# Patient Record
Sex: FEMALE | Race: BLACK OR AFRICAN AMERICAN | NOT HISPANIC OR LATINO | Employment: FULL TIME | URBAN - METROPOLITAN AREA
[De-identification: names, ages, dates, MRNs, and addresses within clinical notes are randomized per-mention and may not be internally consistent; named-entity substitution may affect disease eponyms.]

---

## 2017-03-28 ENCOUNTER — OFFICE VISIT (OUTPATIENT)
Dept: INTERNAL MEDICINE | Facility: CLINIC | Age: 47
End: 2017-03-28
Attending: FAMILY MEDICINE
Payer: COMMERCIAL

## 2017-03-28 VITALS
SYSTOLIC BLOOD PRESSURE: 134 MMHG | WEIGHT: 200.81 LBS | DIASTOLIC BLOOD PRESSURE: 72 MMHG | BODY MASS INDEX: 36.95 KG/M2 | HEART RATE: 97 BPM | HEIGHT: 62 IN

## 2017-03-28 DIAGNOSIS — M54.32 LEFT SCIATIC NERVE PAIN: Primary | ICD-10-CM

## 2017-03-28 PROCEDURE — 1160F RVW MEDS BY RX/DR IN RCRD: CPT | Mod: S$GLB,,, | Performed by: FAMILY MEDICINE

## 2017-03-28 PROCEDURE — 99213 OFFICE O/P EST LOW 20 MIN: CPT | Mod: S$GLB,,, | Performed by: FAMILY MEDICINE

## 2017-03-28 PROCEDURE — 99999 PR PBB SHADOW E&M-EST. PATIENT-LVL III: CPT | Mod: PBBFAC,,, | Performed by: FAMILY MEDICINE

## 2017-03-28 RX ORDER — HYDROCHLOROTHIAZIDE 12.5 MG/1
12.5 CAPSULE ORAL DAILY PRN
Qty: 30 CAPSULE | Refills: 11 | Status: SHIPPED | OUTPATIENT
Start: 2017-03-28 | End: 2018-01-04 | Stop reason: SDUPTHER

## 2017-03-28 RX ORDER — TRAMADOL HYDROCHLORIDE 50 MG/1
50 TABLET ORAL EVERY 6 HOURS PRN
Qty: 30 TABLET | Refills: 1 | Status: SHIPPED | OUTPATIENT
Start: 2017-03-28 | End: 2017-04-07

## 2017-03-28 RX ORDER — METHYLPREDNISOLONE 4 MG/1
TABLET ORAL
Qty: 21 TABLET | Refills: 0 | Status: SHIPPED | OUTPATIENT
Start: 2017-03-28 | End: 2018-01-04

## 2017-03-28 RX ORDER — CYCLOBENZAPRINE HCL 10 MG
10 TABLET ORAL 3 TIMES DAILY PRN
Qty: 30 TABLET | Refills: 0 | Status: SHIPPED | OUTPATIENT
Start: 2017-03-28 | End: 2017-04-07

## 2017-03-28 NOTE — MR AVS SNAPSHOT
[facility]  [address]  [facility] [address]  Phone: [phone]  Fax: [phone]                  Nadir Abernathy   3/28/2017 1:40 PM   Office Visit    Description:  Female : 1970   Provider:  Jag Lake MD   Department:  [facility]           Reason for Visit     Tailbone Pain           Diagnoses this Visit        Comments    Left sciatic nerve pain    -  Primary            To Do List           Future Appointments        Provider Department Dept Phone    3/28/2017 1:40 PM Jag Lake MD [facility] [phone]    2017 3:00 PM Lauren Marsh PA-C [facility] [phone]      Goals (5 Years of Data)     None       These Medications        Disp Refills Start End    tramadol (ULTRAM) 50 mg tablet 30 tablet 1 3/28/2017 2017    Take 1 tablet (50 mg total) by mouth every 6 (six) hours as needed for Pain. - Oral    Pharmacy: [facility] [address] [place] [address] - [address] Ph #: [phone]       cyclobenzaprine (FLEXERIL) 10 MG tablet 30 tablet 0 3/28/2017 2017    Take 1 tablet (10 mg total) by mouth 3 (three) times daily as needed for Muscle spasms. - Oral    Pharmacy: [facility] [address] Ph #: [phone]       methylPREDNISolone (MEDROL DOSEPACK) 4 mg tablet 21 tablet 0 3/28/2017     Take as directed    Pharmacy: [facility] [address] Ph #: [phone]       hydrochlorothiazide (MICROZIDE) 12.5 mg capsule 30 capsule 11 3/28/2017 2017    Take 1 capsule (12.5 mg total) by mouth daily as needed (swelling). - Oral    Pharmacy: [facility] [address] - [address] Ph #: [phone]         [facility]     [facility] - 24/7 Assistance  Registered nurses in the [facility] provide clinical advisement, health education, appointment booking, and other advisory services.  Call for this free service at [phone].             Medications           Message regarding Medications     Verify the changes and/or additions to your medication regime listed below are the same as discussed with your clinician today.  If any of these changes or additions are incorrect, please notify your healthcare provider.        START taking these NEW medications        Refills    tramadol (ULTRAM) 50 mg tablet 1    Sig: Take 1 tablet (50 mg total) by mouth every 6 (six) hours as needed for Pain.    Class: Normal    Route: Oral    cyclobenzaprine (FLEXERIL) 10 MG tablet 0    Sig: Take 1 tablet (10 mg total) by mouth 3 (three) times daily as needed for Muscle spasms.    Class: Normal    Route: Oral    methylPREDNISolone (MEDROL DOSEPACK) 4 mg tablet 0    Sig: Take as directed    Class: Normal    hydrochlorothiazide (MICROZIDE) 12.5 mg capsule 11    Sig: Take 1 capsule (12.5 mg total) by mouth daily as needed (swelling).    Class: Normal    Route: Oral           Verify that the below list of medications is an accurate representation of the medications you are currently taking.  If none reported, the list may be blank. If incorrect, please contact your healthcare provider. Carry this list with you in case of emergency.           Current Medications     lorazepam (ATIVAN) 1 MG tablet Take 1 tablet (1 mg total) by mouth every 6 (six) hours as needed for Anxiety.    cyclobenzaprine (FLEXERIL) 10 MG tablet Take 1 tablet (10 mg total) by mouth 3 (three) times daily as needed for Muscle spasms.    hydrochlorothiazide (MICROZIDE) 12.5 mg capsule Take 1 capsule (12.5 mg total) by mouth daily as needed (swelling).    methylPREDNISolone (MEDROL DOSEPACK) 4 mg tablet Take as directed    omeprazole (PRILOSEC) 40 MG  "capsule Take 1 capsule (40 mg total) by mouth once daily.    tramadol (ULTRAM) 50 mg tablet Take 1 tablet (50 mg total) by mouth every 6 (six) hours as needed for Pain.           Clinical Reference Information           Your Vitals Were     BP Pulse Height Weight Last Period BMI    134/72 97 5' 2" (1.575 m) 91.1 kg (200 lb 13.4 oz) 03/17/2017 36.73 kg/m2      Blood Pressure          Most Recent Value    BP  134/72      Allergies as of 3/28/2017     Shellfish Containing Products      Immunizations Administered on Date of Encounter - 3/28/2017     None      Orders Placed During Today's Visit      Normal Orders This Visit    Ambulatory Referral to Back & Spine Clinic       Language Assistance Services     ATTENTION: Language assistance services are available, free of charge. Please call [phone].      ATENCIÓN: Si wil donato, tiene a jon disposición servicios gratuitos de asistencia lingüística. Llame al [phone].     CHÚ Ý: N?u b?n nói Ti?ng Vi?t, có các d?ch v? h? tr? ngôn ng? mi?n phí dành cho b?n. G?i s? [phone].         [facility] complies with applicable Federal civil rights laws and does not discriminate on the basis of race, color, national origin, age, disability, or sex.        "

## 2017-03-28 NOTE — PROGRESS NOTES
"CHIEF COMPLAINT: Low back pain and lower extremity pain for 3 weeks.    HISTORY OF PRESENT ILLNESS: The patient presents with 3 weeks of low back pain and lower extremity pain.  The patient denies trauma.  The patient has no history of spinal surgery.  There are no myelopathic complaints.  Over the counter medicines have not helped.  She has not previously had problems with sciatica.    REVIEW OF SYSTEMS:  GENERAL: No fever, chills, fatigability or weight loss.  SKIN: No rashes, itching or changes in color or texture of skin.  HEAD: No headaches or recent head trauma.  EYES: Visual acuity fine. No photophobia, ocular pain or diplopia.  EARS: Denies ear pain, discharge or vertigo.  NOSE: No loss of smell, no epistaxis or postnasal drip.  MOUTH & THROAT: No hoarseness or change in voice. No excessive gum bleeding.  NODES: Denies swollen glands.  CHEST: Denies SANTOS, cyanosis, wheezing, cough and sputum production.  CARDIOVASCULAR: Denies chest pain, PND, orthopnea or reduced exercise tolerance.  ABDOMEN: Appetite fine. No weight loss. Denies diarrhea, abdominal pain, hematemesis or blood in stool.  URINARY: No flank pain, dysuria or hematuria.  PERIPHERAL VASCULAR: No claudication or cyanosis.  MUSCULOSKELETAL: No joint stiffness or swelling.  The patient does have lower extremity and lower back pain.  NEUROLOGIC: No history of seizures, paralysis, alteration of gait or coordination.    FAMILY HISTORY: There is no family history of spinal tumors.    SOCIAL HISTORY: Unchanged since recent note by PCP.    PHYSICAL EXAMINATION:   Blood pressure 134/72, pulse 97, height 5' 2" (1.575 m), weight 91.1 kg (200 lb 13.4 oz), last menstrual period 03/17/2017.    APPEARANCE: Well nourished, well developed, in no acute distress.    HEAD: Normocephalic, atraumatic.  EYES: PERRL. EOMI.  Conjunctivae without injection and  anicteric  EARS: TM's intact. Light reflex normal. No retraction or perforation.    NOSE: Mucosa pink. Airway " clear.  MOUTH & THROAT: No tonsillar enlargement. No pharyngeal erythema or exudate. No stridor.  NECK: Supple.   NODES: No cervical, axillary or inguinal lymph node enlargement.  CHEST: Lungs clear to auscultation.  No retractions are noted.  No rales or rhonchi are present.  CARDIOVASCULAR: Normal S1, S2. No rubs, murmurs or gallops.  ABDOMEN: Bowel sounds normal. Not distended. Soft. No tenderness or masses.  No ascites is noted.  MUSCULOSKELETAL:  There is no clubbing, cyanosis, or edema of the extremities x4.  There is full range of motion of the lumbar spine.  There is full range of motion of the extremities x4.  There is no deformity noted.    NEUROLOGIC:       Normal speech development.      Hearing normal.      Normal gait.      Motor and sensory exams grossly normal.      DTR's normal.  PSYCHIATRIC: Patient is alert and oriented x3.  Thought processes are all normal.  There is no homicidality.  There is no suicidality.  There is no evidence of psychosis.    LABORATORY/RADIOLOGY: Chart reviewed.    ASSESSMENT:   Sciatica    PLAN:  Analgesics, anti-inflammatories, and muscle relaxers as per med card.  If the patient does not improve we will refer her to the back and spine clinic

## 2017-03-31 ENCOUNTER — TELEPHONE (OUTPATIENT)
Dept: SPINE | Facility: CLINIC | Age: 47
End: 2017-03-31

## 2017-03-31 DIAGNOSIS — M54.50 LOW BACK PAIN WITHOUT SCIATICA, UNSPECIFIED BACK PAIN LATERALITY, UNSPECIFIED CHRONICITY: Primary | ICD-10-CM

## 2018-01-04 ENCOUNTER — HOSPITAL ENCOUNTER (OUTPATIENT)
Dept: RADIOLOGY | Facility: OTHER | Age: 48
Discharge: HOME | End: 2018-01-04
Attending: FAMILY MEDICINE
Payer: COMMERCIAL

## 2018-01-04 ENCOUNTER — OFFICE VISIT (OUTPATIENT)
Dept: INTERNAL MEDICINE | Facility: CLINIC | Age: 48
End: 2018-01-04
Attending: FAMILY MEDICINE
Payer: COMMERCIAL

## 2018-01-04 VITALS
HEART RATE: 85 BPM | DIASTOLIC BLOOD PRESSURE: 89 MMHG | HEIGHT: 62 IN | SYSTOLIC BLOOD PRESSURE: 134 MMHG | WEIGHT: 207.25 LBS | BODY MASS INDEX: 38.14 KG/M2

## 2018-01-04 DIAGNOSIS — Z12.31 SCREENING MAMMOGRAM, ENCOUNTER FOR: ICD-10-CM

## 2018-01-04 DIAGNOSIS — Z00.00 PREVENTATIVE HEALTH CARE: Primary | ICD-10-CM

## 2018-01-04 DIAGNOSIS — Z12.4 PAP SMEAR FOR CERVICAL CANCER SCREENING: ICD-10-CM

## 2018-01-04 DIAGNOSIS — I10 HYPERTENSION, ESSENTIAL: ICD-10-CM

## 2018-01-04 PROCEDURE — 77063 BREAST TOMOSYNTHESIS BI: CPT | Mod: 26,,, | Performed by: RADIOLOGY

## 2018-01-04 PROCEDURE — 99396 PREV VISIT EST AGE 40-64: CPT | Mod: S$GLB,,, | Performed by: FAMILY MEDICINE

## 2018-01-04 PROCEDURE — 99999 PR PBB SHADOW E&M-EST. PATIENT-LVL III: CPT | Mod: PBBFAC,,, | Performed by: FAMILY MEDICINE

## 2018-01-04 PROCEDURE — 77067 SCR MAMMO BI INCL CAD: CPT | Mod: 26,,, | Performed by: RADIOLOGY

## 2018-01-04 PROCEDURE — 77067 SCR MAMMO BI INCL CAD: CPT | Mod: TC

## 2018-01-04 RX ORDER — HYDROCHLOROTHIAZIDE 12.5 MG/1
CAPSULE ORAL
Refills: 11 | COMMUNITY
Start: 2017-11-08 | End: 2019-04-15

## 2018-01-04 RX ORDER — HYDROCHLOROTHIAZIDE 12.5 MG/1
12.5 CAPSULE ORAL DAILY PRN
Qty: 30 CAPSULE | Refills: 11 | Status: SHIPPED | OUTPATIENT
Start: 2018-01-04 | End: 2019-10-10 | Stop reason: SDUPTHER

## 2018-01-04 RX ORDER — HYDROCHLOROTHIAZIDE 12.5 MG/1
CAPSULE ORAL
COMMUNITY
Start: 2017-11-08 | End: 2020-03-23 | Stop reason: SDUPTHER

## 2018-01-04 NOTE — PROGRESS NOTES
"CHIEF COMPLAINT: The patient presents for annual    HISTORY OF PRESENT ILLNESS: The patient presents for annual.  No specific problems.  BP is good.  Needs to see GYN.    REVIEW OF SYSTEMS:  GENERAL: No fatigability or weight loss.  SKIN: No rashes, itching or changes in color or texture of skin.  HEAD: No headaches or recent head trauma.  EYES: Visual acuity fine. No photophobia, ocular pain or diplopia.  EARS: Denies ear pain, discharge or vertigo.  NOSE: No loss of smell, no epistaxis or postnasal drip.  MOUTH & THROAT: No hoarseness or change in voice. No excessive gum bleeding.  NODES: Denies swollen glands.  CHEST: Denies SANTOS, cyanosis, wheezing, and sputum production.  CARDIOVASCULAR: Denies chest pain, PND, orthopnea or reduced exercise tolerance.  ABDOMEN: Appetite fine. No weight loss. Denies diarrhea, abdominal pain, hematemesis or blood in stool.  URINARY: No flank pain, dysuria or hematuria.  PERIPHERAL VASCULAR: No claudication or cyanosis.  MUSCULOSKELETAL: No joint stiffness or swelling. Denies back pain.  NEUROLOGIC: No history of seizures, paralysis, alteration of gait or coordination.    MEDICATIONS: The patient's MedCard has been reviewed and reconciled.     ALLERGIES: The patients' Allergy Card has been reviewed and reconciled.    PAST MEDICAL HISTORY: Unchanged since recent note    SOCIAL HISTORY: Unchanged since recent note    PHYSICAL EXAMINATION:   Blood pressure 134/89, pulse 85, height 5' 2" (1.575 m), weight 94 kg (207 lb 3.7 oz).    APPEARANCE: Well nourished, well developed, in no acute distress.    HEAD: Normocephalic, atraumatic.  EYES: PERRL. EOMI.  Conjunctivae without injection and  anicteric  EARS: TM's intact. Light reflex normal. No retraction or perforation.    NOSE: Mucosa pink. Airway clear.  MOUTH & THROAT: No tonsillar enlargement. No pharyngeal erythema or exudate. No stridor.  NECK: Supple.   NODES: No cervical, axillary or inguinal lymph node enlargement.  CHEST: Lungs " demonstrate scattered mild wheezes bilaterally.  No retractions are noted.  No rales or rhonchi are present.  CARDIOVASCULAR: Normal S1, S2. No rubs, murmurs or gallops.  ABDOMEN: Bowel sounds normal. Not distended. Soft. No tenderness or masses.  No ascites is noted.  MUSCULOSKELETAL:  There is no clubbing, cyanosis, or edema of the extremities x4.  There is full range of motion of the lumbar spine.  There is full range of motion of the extremities x4.  There is no deformity noted.    NEUROLOGIC:       Normal speech development.      Hearing normal.      Normal gait.      Motor and sensory exams grossly normal.      DTR's normal.  PSYCHIATRIC: Patient is alert and oriented x3.  Thought processes are all normal.  There is no homicidality.  There is no suicidality.  There is no evidence of psychosis.    LABORATORY/RADIOLOGY: Chart reviewed.    ASSESSMENT:   Annual exam  HTN    PLAN:  We will follow-up blood work which we expect to be normal.  [facility]  RTC 1 year

## 2018-01-10 ENCOUNTER — TELEPHONE (OUTPATIENT)
Dept: INTERNAL MEDICINE | Facility: CLINIC | Age: 48
End: 2018-01-10

## 2018-01-10 NOTE — LETTER
January 15, 2018    Nadir Abernathy  [address] [facility] [place] [address]             [facility]  [address] [facility] [address]  Phone: [phone]  Fax: [phone] Dear MsFly Michela:    Please call the office to schedule the following appointment(s)      - AMB REFERRAL TO OB-GYN    If you have any questions or concerns, please don't hesitate to call.    Sincerely,        Regina Abebe   Referral Coordinator

## 2018-01-15 ENCOUNTER — HOSPITAL ENCOUNTER (OUTPATIENT)
Dept: RADIOLOGY | Facility: OTHER | Age: 48
Discharge: HOME | End: 2018-01-15
Attending: FAMILY MEDICINE
Payer: COMMERCIAL

## 2018-01-15 DIAGNOSIS — R92.8 ABNORMAL MAMMOGRAM: ICD-10-CM

## 2018-01-15 PROCEDURE — 77065 DX MAMMO INCL CAD UNI: CPT | Mod: TC

## 2018-01-15 PROCEDURE — 77061 BREAST TOMOSYNTHESIS UNI: CPT | Mod: TC

## 2018-01-15 PROCEDURE — 77061 BREAST TOMOSYNTHESIS UNI: CPT | Mod: 26,,, | Performed by: RADIOLOGY

## 2018-01-15 PROCEDURE — 77065 DX MAMMO INCL CAD UNI: CPT | Mod: 26,,, | Performed by: RADIOLOGY

## 2018-06-01 DIAGNOSIS — Z12.11 COLON CANCER SCREENING: ICD-10-CM

## 2019-04-10 ENCOUNTER — TELEPHONE (OUTPATIENT)
Dept: OBSTETRICS AND GYNECOLOGY | Facility: CLINIC | Age: 49
End: 2019-04-10

## 2019-04-10 ENCOUNTER — HOSPITAL ENCOUNTER (EMERGENCY)
Facility: OTHER | Age: 49
Discharge: HOME | End: 2019-04-10
Attending: EMERGENCY MEDICINE
Payer: COMMERCIAL

## 2019-04-10 VITALS
SYSTOLIC BLOOD PRESSURE: 142 MMHG | RESPIRATION RATE: 18 BRPM | DIASTOLIC BLOOD PRESSURE: 85 MMHG | BODY MASS INDEX: 37.49 KG/M2 | WEIGHT: 205 LBS | HEART RATE: 76 BPM | TEMPERATURE: 98 F | OXYGEN SATURATION: 100 %

## 2019-04-10 DIAGNOSIS — K59.00 CONSTIPATION, UNSPECIFIED CONSTIPATION TYPE: ICD-10-CM

## 2019-04-10 DIAGNOSIS — R10.13 EPIGASTRIC PAIN: ICD-10-CM

## 2019-04-10 DIAGNOSIS — D64.9 ANEMIA, UNSPECIFIED TYPE: ICD-10-CM

## 2019-04-10 DIAGNOSIS — R10.9 ABDOMINAL PAIN: Primary | ICD-10-CM

## 2019-04-10 LAB
ALBUMIN SERPL BCP-MCNC: 3.9 G/DL (ref 3.5–5.2)
ALP SERPL-CCNC: 54 U/L (ref 55–135)
ALT SERPL W/O P-5'-P-CCNC: 16 U/L (ref 10–44)
ANION GAP SERPL CALC-SCNC: 9 MMOL/L (ref 8–16)
ANISOCYTOSIS BLD QL SMEAR: SLIGHT
AST SERPL-CCNC: 23 U/L (ref 10–40)
BACTERIA #/AREA URNS HPF: ABNORMAL /HPF
BASOPHILS # BLD AUTO: 0.03 K/UL (ref 0–0.2)
BASOPHILS NFR BLD: 0.5 % (ref 0–1.9)
BILIRUB SERPL-MCNC: 0.3 MG/DL (ref 0.1–1)
BILIRUB UR QL STRIP: NEGATIVE
BUN SERPL-MCNC: 9 MG/DL (ref 6–20)
CALCIUM SERPL-MCNC: 10.2 MG/DL (ref 8.7–10.5)
CAOX CRY URNS QL MICRO: ABNORMAL
CHLORIDE SERPL-SCNC: 104 MMOL/L (ref 95–110)
CLARITY UR: CLEAR
CO2 SERPL-SCNC: 26 MMOL/L (ref 23–29)
COLOR UR: YELLOW
CREAT SERPL-MCNC: 0.8 MG/DL (ref 0.5–1.4)
DACRYOCYTES BLD QL SMEAR: ABNORMAL
DIFFERENTIAL METHOD: ABNORMAL
EOSINOPHIL # BLD AUTO: 0.1 K/UL (ref 0–0.5)
EOSINOPHIL NFR BLD: 1.2 % (ref 0–8)
ERYTHROCYTE [DISTWIDTH] IN BLOOD BY AUTOMATED COUNT: 19.4 % (ref 11.5–14.5)
EST. GFR  (AFRICAN AMERICAN): >60 ML/MIN/1.73 M^2
EST. GFR  (NON AFRICAN AMERICAN): >60 ML/MIN/1.73 M^2
GLUCOSE SERPL-MCNC: 87 MG/DL (ref 70–110)
GLUCOSE UR QL STRIP: NEGATIVE
HCT VFR BLD AUTO: 35.7 % (ref 37–48.5)
HGB BLD-MCNC: 10.3 G/DL (ref 12–16)
HGB UR QL STRIP: ABNORMAL
HYPOCHROMIA BLD QL SMEAR: ABNORMAL
KETONES UR QL STRIP: NEGATIVE
LEUKOCYTE ESTERASE UR QL STRIP: NEGATIVE
LIPASE SERPL-CCNC: 17 U/L (ref 4–60)
LYMPHOCYTES # BLD AUTO: 1.8 K/UL (ref 1–4.8)
LYMPHOCYTES NFR BLD: 29.6 % (ref 18–48)
MCH RBC QN AUTO: 20.9 PG (ref 27–31)
MCHC RBC AUTO-ENTMCNC: 28.9 G/DL (ref 32–36)
MCV RBC AUTO: 72 FL (ref 82–98)
MICROSCOPIC COMMENT: ABNORMAL
MONOCYTES # BLD AUTO: 0.4 K/UL (ref 0.3–1)
MONOCYTES NFR BLD: 7.2 % (ref 4–15)
NEUTROPHILS # BLD AUTO: 3.6 K/UL (ref 1.8–7.7)
NEUTROPHILS NFR BLD: 61.5 % (ref 38–73)
NITRITE UR QL STRIP: NEGATIVE
PH UR STRIP: 6 [PH] (ref 5–8)
PLATELET # BLD AUTO: 427 K/UL (ref 150–350)
PMV BLD AUTO: 9.5 FL (ref 9.2–12.9)
POTASSIUM SERPL-SCNC: 4 MMOL/L (ref 3.5–5.1)
PROT SERPL-MCNC: 8.1 G/DL (ref 6–8.4)
PROT UR QL STRIP: NEGATIVE
RBC # BLD AUTO: 4.93 M/UL (ref 4–5.4)
RBC #/AREA URNS HPF: 43 /HPF (ref 0–4)
SCHISTOCYTES BLD QL SMEAR: PRESENT
SODIUM SERPL-SCNC: 139 MMOL/L (ref 136–145)
SP GR UR STRIP: >=1.03 (ref 1–1.03)
SQUAMOUS #/AREA URNS HPF: 2 /HPF
URN SPEC COLLECT METH UR: ABNORMAL
UROBILINOGEN UR STRIP-ACNC: NEGATIVE EU/DL
WBC # BLD AUTO: 5.94 K/UL (ref 3.9–12.7)
WBC #/AREA URNS HPF: 5 /HPF (ref 0–5)

## 2019-04-10 PROCEDURE — 63600175 PHARM REV CODE 636 W HCPCS: Performed by: PHYSICIAN ASSISTANT

## 2019-04-10 PROCEDURE — 81000 URINALYSIS NONAUTO W/SCOPE: CPT

## 2019-04-10 PROCEDURE — 99284 EMERGENCY DEPT VISIT MOD MDM: CPT | Mod: 25

## 2019-04-10 PROCEDURE — 85025 COMPLETE CBC W/AUTO DIFF WBC: CPT

## 2019-04-10 PROCEDURE — 96374 THER/PROPH/DIAG INJ IV PUSH: CPT

## 2019-04-10 PROCEDURE — 83690 ASSAY OF LIPASE: CPT

## 2019-04-10 PROCEDURE — 96361 HYDRATE IV INFUSION ADD-ON: CPT

## 2019-04-10 PROCEDURE — 80053 COMPREHEN METABOLIC PANEL: CPT

## 2019-04-10 PROCEDURE — 25000003 PHARM REV CODE 250: Performed by: PHYSICIAN ASSISTANT

## 2019-04-10 PROCEDURE — 96375 TX/PRO/DX INJ NEW DRUG ADDON: CPT

## 2019-04-10 PROCEDURE — C9113 INJ PANTOPRAZOLE SODIUM, VIA: HCPCS | Performed by: PHYSICIAN ASSISTANT

## 2019-04-10 RX ORDER — ONDANSETRON 2 MG/ML
4 INJECTION INTRAMUSCULAR; INTRAVENOUS
Status: COMPLETED | OUTPATIENT
Start: 2019-04-10 | End: 2019-04-10

## 2019-04-10 RX ORDER — ONDANSETRON 4 MG/1
4 TABLET, ORALLY DISINTEGRATING ORAL EVERY 8 HOURS PRN
Qty: 20 TABLET | Refills: 0 | Status: SHIPPED | OUTPATIENT
Start: 2019-04-10 | End: 2022-06-08 | Stop reason: SDUPTHER

## 2019-04-10 RX ORDER — OMEPRAZOLE 20 MG/1
20 CAPSULE, DELAYED RELEASE ORAL DAILY
Qty: 30 CAPSULE | Refills: 0 | Status: SHIPPED | OUTPATIENT
Start: 2019-04-10 | End: 2022-06-08 | Stop reason: SDUPTHER

## 2019-04-10 RX ORDER — PANTOPRAZOLE SODIUM 40 MG/10ML
40 INJECTION, POWDER, LYOPHILIZED, FOR SOLUTION INTRAVENOUS
Status: COMPLETED | OUTPATIENT
Start: 2019-04-10 | End: 2019-04-10

## 2019-04-10 RX ADMIN — SODIUM CHLORIDE 1000 ML: 0.9 INJECTION, SOLUTION INTRAVENOUS at 04:04

## 2019-04-10 RX ADMIN — ONDANSETRON 4 MG: 2 INJECTION, SOLUTION INTRAMUSCULAR; INTRAVENOUS at 04:04

## 2019-04-10 RX ADMIN — PANTOPRAZOLE SODIUM 40 MG: 40 INJECTION, POWDER, LYOPHILIZED, FOR SOLUTION INTRAVENOUS at 04:04

## 2019-04-10 NOTE — ED PROVIDER NOTES
"Encounter Date: 4/10/2019       History     Chief Complaint   Patient presents with    Abdominal Pain     lower abd pain x 3 days "i think my ulcer be acting up". Unable to tolerate food. +vomiting     Patient is a 48-year-old female with history of peptic ulcer disease who presents to the emergency department with abdominal pain.  Patient states over the last 3 days she has had flare up of my ulcers.  She states she has had nausea and vomiting. She states she vomited multiple times yesterday.  She reports burning sensation in the upper epigastric region of her abdomen.  She states this is how she typically feels when she has a flare up.  She states she had been taking omeprazole pretty regularly until a couple of months back when she ran out.  She denies diarrhea.  She denies fevers or chills. She denies urinary symptoms. She denies chest pain or shortness of breath.  She denies hematemesis.  She denies blood in stool.  She states she does drink alcohol pretty regularly, but not daily.  She states she was not able to eat much this morning because of her pain and nausea.  She states she only ate half a bowl of oatmeal prior to arrival.    The history is provided by the patient.     Review of patient's allergies indicates:   Allergen Reactions    Shellfish containing products      Past Medical History:   Diagnosis Date    Fibroids 1995     No past surgical history on file.  Family History   Problem Relation Age of Onset    Diabetes Father      Social History     Tobacco Use    Smoking status: Never Smoker    Smokeless tobacco: Never Used   Substance Use Topics    Alcohol use: Yes     Alcohol/week: 0.0 oz    Drug use: No     Review of Systems   Constitutional: Positive for appetite change. Negative for activity change, chills, fatigue and fever.   HENT: Negative for congestion, ear discharge, ear pain, postnasal drip, rhinorrhea, sore throat and trouble swallowing.    Eyes: Negative for photophobia and " visual disturbance.   Respiratory: Negative for cough.    Cardiovascular: Negative for chest pain.   Gastrointestinal: Positive for abdominal pain, nausea and vomiting. Negative for anal bleeding, blood in stool, constipation and diarrhea.   Genitourinary: Negative for dysuria, flank pain and hematuria.   Musculoskeletal: Negative for back pain, neck pain and neck stiffness.   Skin: Negative for rash and wound.   Neurological: Negative for dizziness, weakness, light-headedness and headaches.       Physical Exam     Initial Vitals [04/10/19 1423]   BP Pulse Resp Temp SpO2   128/73 80 16 98.1 °F (36.7 °C) 100 %      MAP       --         Physical Exam    Nursing note and vitals reviewed.  Constitutional: She appears well-developed and well-nourished. She is not diaphoretic.  Non-toxic appearance. No distress.   HENT:   Head: Normocephalic.   Right Ear: External ear normal.   Left Ear: External ear normal.   Nose: Nose normal.   Mouth/Throat: Oropharynx is clear and moist. No oropharyngeal exudate.   Eyes: Conjunctivae and EOM are normal. Pupils are equal, round, and reactive to light.   Neck: Normal range of motion.   Cardiovascular: Normal rate and regular rhythm.   Pulmonary/Chest: Breath sounds normal.   Abdominal: Soft. Bowel sounds are normal. There is tenderness in the epigastric area.   Lymphadenopathy:     She has no cervical adenopathy.   Neurological: She is alert and oriented to person, place, and time.   Skin: Skin is warm and dry. Capillary refill takes less than 2 seconds.   Psychiatric: She has a normal mood and affect.         ED Course   Procedures  Labs Reviewed   CBC W/ AUTO DIFFERENTIAL - Abnormal; Notable for the following components:       Result Value    Hemoglobin 10.3 (*)     Hematocrit 35.7 (*)     MCV 72 (*)     MCH 20.9 (*)     MCHC 28.9 (*)     RDW 19.4 (*)     Platelets 427 (*)     All other components within normal limits    Narrative:     For upper or mid abdominal pain.   COMPREHENSIVE  METABOLIC PANEL - Abnormal; Notable for the following components:    Alkaline Phosphatase 54 (*)     All other components within normal limits    Narrative:     For upper or mid abdominal pain.   LIPASE    Narrative:     For upper or mid abdominal pain.   URINALYSIS, REFLEX TO URINE CULTURE   URINALYSIS MICROSCOPIC          Imaging Results          X-Ray Abdomen Flat And Erect (Final result)  Result time 04/10/19 16:14:34    Final result by Jessica Hunter MD (04/10/19 16:14:34)                 Impression:      As above      Electronically signed by: Jessica Hunter MD  Date:    04/10/2019  Time:    16:14             Narrative:    EXAMINATION:  XR ABDOMEN FLAT AND ERECT    CLINICAL HISTORY:  Unspecified abdominal pain    TECHNIQUE:  Flat and erect AP views of the abdomen were performed.    COMPARISON:  None    FINDINGS:  There is no free air.  The osseous structures are intact.  There is mild stool within the colon.  There is no bowel distension.                              X-Rays:   Independently Interpreted Readings:   Other Readings:  No free air.  Normal bowel gas pattern.  Mild constipation.    Medical Decision Making:   Initial Assessment:   Urgent evaluation of a 48-year-old female with history of peptic ulcer disease who presents to the emergency department with nausea and vomiting. Patient is afebrile, nontoxic appearing, and hemodynamically stable. On exam, there is mild tenderness in the epigastric region.  Normal bowel sounds. My differential diagnosis includes but is not limited to gastritis, ulcer flare, gastric perforation, cholecystitis, pancreatitis.  Labs will be obtained.  Patient will be given fluids and antiemetics.  Patient will be given Protonix.  Flat and erect will be obtained.  Clinical Tests:   Lab Tests: Ordered and Reviewed  Radiological Study: Ordered and Reviewed  ED Management:  5:47 PM  X-ray reveals no free air.  Mild constipation noted.  Labs reveal mild anemia.  No kidney  insufficiency or electrolyte disturbance.  No elevation in liver enzymes and pancreatic enzymes.  Urinalysis is pending.    5:52 PM  Urinalysis reveals blood which is secondary to patient's menstrual cycle.  Patient will be discharged with Zofran and omeprazole.  She is advised to drink plenty of fluids.  She is given remedies to help with constipation.  She is advised to follow up with PCP and GI.  She is advised to return to the emergency department with any worsening symptoms or concerns.                   ED Course as of Apr 10 1746   Wed Apr 10, 2019   1744 RBC, UA(!): 43 [MG]   1744 Specific Gravity, UA(!): >=1.030 [MG]   1745 Occult Blood UA(!): 2+ [MG]      ED Course User Index  [MG] Malaika Briscoe MD     Clinical Impression:       ICD-10-CM ICD-9-CM   1. Abdominal pain R10.9 789.00   2. Epigastric pain R10.13 789.06   3. Constipation, unspecified constipation type K59.00 564.00   4. Anemia, unspecified type D64.9 285.9                                Lynn Marie PA-C  04/10/19 [id number]

## 2019-04-10 NOTE — ED NOTES
Pt sitting up in bed, respirations even/unlabored. NAD noted. Pt reports relief from pain medication. Updated pt on POC. Pt denies any needs at this time. Side rails upx2, call bell within reach. Will continue to monitor

## 2019-04-10 NOTE — TELEPHONE ENCOUNTER
----- Message from Vashti Del Toro sent at 4/9/2019  4:33 PM CDT -----  Contact: Self            Name of Who is Calling: KATELYNN LUCERO [[id number]]      What is the request in detail: Pt states she would like to establish care with Dr. Toshia Watts to get a wellness check up and also discuss a hysterectomy. Please contact to further discuss and advise.        Can the clinic reply by [facility]:  N      What Number [facility] Back [facility]: [phone]

## 2019-04-10 NOTE — ED NOTES
Pt moved to ed  7. Pt sitting up, respirations even/unlabored. NAD noted. Updated pt on POC. Pt denies any needs at this time. Side rails upx2, call bell within reach. Will continue to monitor.

## 2019-04-15 ENCOUNTER — OFFICE VISIT (OUTPATIENT)
Dept: INTERNAL MEDICINE | Facility: CLINIC | Age: 49
End: 2019-04-15
Attending: FAMILY MEDICINE
Payer: COMMERCIAL

## 2019-04-15 VITALS
SYSTOLIC BLOOD PRESSURE: 116 MMHG | HEIGHT: 63 IN | WEIGHT: 186.5 LBS | BODY MASS INDEX: 33.04 KG/M2 | HEART RATE: 81 BPM | OXYGEN SATURATION: 99 % | DIASTOLIC BLOOD PRESSURE: 80 MMHG

## 2019-04-15 DIAGNOSIS — K29.60 NSAID INDUCED GASTRITIS: ICD-10-CM

## 2019-04-15 DIAGNOSIS — I10 HYPERTENSION, ESSENTIAL: ICD-10-CM

## 2019-04-15 DIAGNOSIS — T39.395A NSAID INDUCED GASTRITIS: ICD-10-CM

## 2019-04-15 DIAGNOSIS — M79.672 INTRACTABLE LEFT HEEL PAIN: Primary | ICD-10-CM

## 2019-04-15 PROCEDURE — 3079F PR MOST RECENT DIASTOLIC BLOOD PRESSURE 80-89 MM HG: ICD-10-PCS | Mod: CPTII,S$GLB,, | Performed by: FAMILY MEDICINE

## 2019-04-15 PROCEDURE — 3079F DIAST BP 80-89 MM HG: CPT | Mod: CPTII,S$GLB,, | Performed by: FAMILY MEDICINE

## 2019-04-15 PROCEDURE — 3074F PR MOST RECENT SYSTOLIC BLOOD PRESSURE < 130 MM HG: ICD-10-PCS | Mod: CPTII,S$GLB,, | Performed by: FAMILY MEDICINE

## 2019-04-15 PROCEDURE — 3074F SYST BP LT 130 MM HG: CPT | Mod: CPTII,S$GLB,, | Performed by: FAMILY MEDICINE

## 2019-04-15 PROCEDURE — 99999 PR PBB SHADOW E&M-EST. PATIENT-LVL III: ICD-10-PCS | Mod: PBBFAC,,, | Performed by: FAMILY MEDICINE

## 2019-04-15 PROCEDURE — 99214 OFFICE O/P EST MOD 30 MIN: CPT | Mod: S$GLB,,, | Performed by: FAMILY MEDICINE

## 2019-04-15 PROCEDURE — 3008F PR BODY MASS INDEX (BMI) DOCUMENTED: ICD-10-PCS | Mod: CPTII,S$GLB,, | Performed by: FAMILY MEDICINE

## 2019-04-15 PROCEDURE — 99999 PR PBB SHADOW E&M-EST. PATIENT-LVL III: CPT | Mod: PBBFAC,,, | Performed by: FAMILY MEDICINE

## 2019-04-15 PROCEDURE — 99214 PR OFFICE/OUTPT VISIT, EST, LEVL IV, 30-39 MIN: ICD-10-PCS | Mod: S$GLB,,, | Performed by: FAMILY MEDICINE

## 2019-04-15 PROCEDURE — 3008F BODY MASS INDEX DOCD: CPT | Mod: CPTII,S$GLB,, | Performed by: FAMILY MEDICINE

## 2019-04-15 RX ORDER — MELOXICAM 15 MG/1
15 TABLET ORAL DAILY
Qty: 30 TABLET | Refills: 2 | Status: SHIPPED | OUTPATIENT
Start: 2019-04-15 | End: 2020-10-30

## 2019-04-15 RX ORDER — TRAMADOL HYDROCHLORIDE 50 MG/1
50 TABLET ORAL
COMMUNITY
End: 2019-04-15

## 2019-04-15 RX ORDER — IBUPROFEN 800 MG/1
800 TABLET ORAL
COMMUNITY
End: 2019-04-15

## 2019-04-15 NOTE — PROGRESS NOTES
"CHIEF COMPLAINT: The patient presents with a 2 week history of progressive left foot pain    HISTORY OF PRESENT ILLNESS: The patient presents with a 2 week history of progressive left heel pain. She has taken a lot of NSAIDs and ended up with an NSAID gastritis.  She stopped her NSAIDs and the pain is continuous.  It is worse when she stands or walks.  She has some point tenderness as well.  She works as a .    BP is good.     REVIEW OF SYSTEMS:  GENERAL: No fatigability or weight loss.  SKIN: No rashes, itching or changes in color or texture of skin.  HEAD: No headaches or recent head trauma.  EYES: Visual acuity fine. No photophobia, ocular pain or diplopia.  EARS: Denies ear pain, discharge or vertigo.  NOSE: No loss of smell, no epistaxis or postnasal drip.  MOUTH & THROAT: No hoarseness or change in voice. No excessive gum bleeding.  NODES: Denies swollen glands.  CHEST: Denies SANTOS, cyanosis, wheezing, and sputum production.  CARDIOVASCULAR: Denies chest pain, PND, orthopnea or reduced exercise tolerance.  ABDOMEN: Appetite fine. No weight loss. Denies diarrhea, abdominal pain, hematemesis or blood in stool.  URINARY: No flank pain, dysuria or hematuria.  PERIPHERAL VASCULAR: No claudication or cyanosis.  MUSCULOSKELETAL: No joint stiffness or swelling. Denies back pain. Except as above  NEUROLOGIC: No history of seizures, paralysis, alteration of gait or coordination.    SOCIAL HISTORY: Unchanged since recent note    PHYSICAL EXAMINATION:   Blood pressure 116/80, pulse 81, height 5' 3" (1.6 m), weight 84.6 kg (186 lb 8.2 oz), last menstrual period 04/02/2019, SpO2 99 %.    APPEARANCE: Well nourished, well developed, in no acute distress.    HEAD: Normocephalic, atraumatic.  EYES: PERRL. EOMI.  Conjunctivae without injection and  anicteric  EARS: TM's intact. Light reflex normal. No retraction or perforation.    NOSE: Mucosa pink. Airway clear.  MOUTH & THROAT: No tonsillar enlargement. No pharyngeal " erythema or exudate. No stridor.  NECK: Supple.   NODES: No cervical, axillary or inguinal lymph node enlargement.  CHEST: Lungs demonstrate scattered mild wheezes bilaterally.  No retractions are noted.  No rales or rhonchi are present.  CARDIOVASCULAR: Normal S1, S2. No rubs, murmurs or gallops.  ABDOMEN: Bowel sounds normal. Not distended. Soft. No tenderness or masses.  No ascites is noted.  MUSCULOSKELETAL:  There is no clubbing, cyanosis, or edema of the extremities x4.  There is full range of motion of the lumbar spine.  There is full range of motion of the extremities x4.  There is no deformity noted.    NEUROLOGIC:       Normal speech development.      Hearing normal.      Normal gait.      Motor and sensory exams grossly normal.  PSYCHIATRIC: Patient is alert and oriented x3.  Thought processes are all normal.  There is no homicidality.  There is no suicidality.  There is no evidence of psychosis.    LABORATORY/RADIOLOGY: Chart reviewed.    ASSESSMENT:   Left heel pain  NSAID gastritis   HTN    PLAN:  Mobic  Continue omeprazole  HCTZ  Podiatry referral  RTC 1 year

## 2019-04-25 ENCOUNTER — OFFICE VISIT (OUTPATIENT)
Dept: PODIATRY | Facility: CLINIC | Age: 49
End: 2019-04-25
Attending: FAMILY MEDICINE
Payer: COMMERCIAL

## 2019-04-25 ENCOUNTER — APPOINTMENT (OUTPATIENT)
Dept: RADIOLOGY | Facility: OTHER | Age: 49
End: 2019-04-25
Attending: PODIATRIST
Payer: COMMERCIAL

## 2019-04-25 VITALS — WEIGHT: 186 LBS | HEIGHT: 63 IN | BODY MASS INDEX: 32.96 KG/M2

## 2019-04-25 DIAGNOSIS — M79.672 PAIN OF LEFT HEEL: ICD-10-CM

## 2019-04-25 DIAGNOSIS — M72.2 PLANTAR FASCIITIS: ICD-10-CM

## 2019-04-25 DIAGNOSIS — M79.672 PAIN OF LEFT HEEL: Primary | ICD-10-CM

## 2019-04-25 PROCEDURE — 99204 PR OFFICE/OUTPT VISIT, NEW, LEVL IV, 45-59 MIN: ICD-10-PCS | Mod: S$GLB,,, | Performed by: PODIATRIST

## 2019-04-25 PROCEDURE — 99999 PR PBB SHADOW E&M-EST. PATIENT-LVL II: CPT | Mod: PBBFAC,,, | Performed by: PODIATRIST

## 2019-04-25 PROCEDURE — 73650 X-RAY EXAM OF HEEL: CPT | Mod: TC,LT

## 2019-04-25 PROCEDURE — 73650 XR CALCANEUS 2 VIEW LEFT: ICD-10-PCS | Mod: 26,LT,, | Performed by: RADIOLOGY

## 2019-04-25 PROCEDURE — 99204 OFFICE O/P NEW MOD 45 MIN: CPT | Mod: S$GLB,,, | Performed by: PODIATRIST

## 2019-04-25 PROCEDURE — 3008F PR BODY MASS INDEX (BMI) DOCUMENTED: ICD-10-PCS | Mod: CPTII,S$GLB,, | Performed by: PODIATRIST

## 2019-04-25 PROCEDURE — 73650 X-RAY EXAM OF HEEL: CPT | Mod: 26,LT,, | Performed by: RADIOLOGY

## 2019-04-25 PROCEDURE — 99999 PR PBB SHADOW E&M-EST. PATIENT-LVL II: ICD-10-PCS | Mod: PBBFAC,,, | Performed by: PODIATRIST

## 2019-04-25 PROCEDURE — 3008F BODY MASS INDEX DOCD: CPT | Mod: CPTII,S$GLB,, | Performed by: PODIATRIST

## 2019-04-25 RX ORDER — METHYLPREDNISOLONE 4 MG/1
4 TABLET ORAL DAILY
Qty: 1 TABLET | Refills: 0 | Status: SHIPPED | OUTPATIENT
Start: 2019-04-25 | End: 2020-10-30

## 2019-04-25 NOTE — LETTER
April 25, 2019      Jag Lake MD  [address]  [facility] [address]  [facility] [address]           [place] - [facility]iat[facility]  [address]  [facility] [address]  Phone: [phone]  Fax: [phone]          Patient: Nadir Abernathy   MR Number: [id number]   YOB: 1970   Date of Visit: 4/25/2019       Dear Dr. Jag Lake:    Thank you for referring Nadir Abernathy to me for evaluation. Attached you will find relevant portions of my assessment and plan of care.    If you have questions, please do not hesitate to call me. I look forward to following Nadir Abernathy along with you.    Sincerely,    Jeny Corado, AMANDA    Enclosure  CC:  No Recipients    If you would like to receive this communication electronically, please contact [email or web address] or [phone] to request more information on [employer] access.    For providers and/or their staff who would like to refer a patient to [facility], please contact us through our one-stop-shop provider referral line, [facility], at [phone].    If you feel you have received this communication in error or would no longer like to receive these types of communications, please e-mail [email or web address]

## 2019-04-25 NOTE — PROGRESS NOTES
Chief Complaint   Patient presents with    Heel Pain     Left              HPI:       Nadir Abernathy is a 48 y.o. female who presents to clinic complaining of left plantar heel pain for about 3 weeks.   Pain is worse with weight bearing and first few steps after prolonged periods of rest, and even after prolonged standing.    Pain is better with rest.  Patient denies acute trauma to the affected area.   She is limping due to pain.  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  She wears clog type shoes for work.    Treatment tried: Mobic, not helping.         Past Medical History:   Diagnosis Date    Fibroids 1995         Current Outpatient Medications on File Prior to Visit   Medication Sig Dispense Refill    hydroCHLOROthiazide (MICROZIDE) 12.5 mg capsule       meloxicam (MOBIC) 15 MG tablet Take 1 tablet (15 mg total) by mouth once daily. 30 tablet 2    omeprazole (PRILOSEC) 20 MG capsule Take 1 capsule (20 mg total) by mouth once daily. 30 capsule 0    ondansetron (ZOFRAN-ODT) 4 MG TbDL Take 1 tablet (4 mg total) by mouth every 8 (eight) hours as needed (nausea). 20 tablet 0     No current facility-administered medications on file prior to visit.            Review of patient's allergies indicates:   Allergen Reactions    Shellfish containing products          Social History     Socioeconomic History    Marital status: Single     Spouse name: Not on file    Number of children: Not on file    Years of education: Not on file    Highest education level: Not on file   Occupational History    Not on file   Social Needs    Financial resource strain: Not on file    Food insecurity:     Worry: Not on file     Inability: Not on file    Transportation needs:     Medical: Not on file     Non-medical: Not on file   Tobacco Use    Smoking status: Never Smoker    Smokeless tobacco: Never Used   Substance and Sexual Activity    Alcohol use: Yes     Alcohol/week: 0.0 oz     "Drug use: No    Sexual activity: Not on file   Lifestyle    Physical activity:     Days per week: Not on file     Minutes per session: Not on file    Stress: Not on file   Relationships    Social connections:     Talks on phone: Not on file     Gets together: Not on file     Attends Jainism service: Not on file     Active member of club or organization: Not on file     Attends meetings of clubs or organizations: Not on file     Relationship status: Not on file   Other Topics Concern    Not on file   Social History Narrative    Not on file           ROS:  General ROS: negative for  chills, fatigue or fever  Cardiovascular ROS: no chest pain or dyspnea on exertion  Musculoskeletal ROS: negative for joint pain or joint stiffness.  Negative for loss of strength.  Positive for foot pain.   Neuro ROS: Negative for syncope, numbness, or muscle weakness  Skin ROS: Negative for rash, itching or nail/hair changes.           OBJECTIVE:         Vitals:    04/25/19 1552   Weight: 84.4 kg (186 lb)   Height: 5' 3" (1.6 m)        Left Lower extremity exam:  Vasc:   Palpable pedal pulses.   Feet appropriately warm to touch.   Cap refill time is within normal limits   Edema: none    Neurological:    Light touch, proprioception, and Sharp/dull sensation are all intact.   There is no Tinel's along the tarsal tunnel.    Mulders click:   negative  Reflexes:  2+      Derm:   No open lesions, macerations, or rashes.  Bruising:  None  ++varicosities/spider veins in the area.   No redness/cellulitis.       MSK:    Palpable pain plantar medial tubercle of the calcaneus left,   Palpable pain medial band of plantar fascia left   tightness to the Achilles tendon with ROM left   There is no pain with the lateral heel squeeze/compression  test.       Xrays   04/25/2019 pending final report          ASSESSMENT/PLAN:           Problem List Items Addressed This Visit     None      Visit Diagnoses     Pain of left heel    -  Primary    " Relevant Medications    methylPREDNISolone (MEDROL DOSEPACK) 4 mg tablet    Other Relevant Orders    X-Ray Calcaneus 2 View Left    Plantar fasciitis        Relevant Medications    methylPREDNISolone (MEDROL DOSEPACK) 4 mg tablet    Other Relevant Orders    X-Ray Calcaneus 2 View Left            I counseled the patient on the patient's conditions, their implications and medical management.      Reviewed foot xrays with patient. Discussed different treatment options for heel pain. I gave written and verbal instructions on stretching exercises.   Patient expressed understanding. Discussed icing the affected area as needed and also wearing appropriate shoe gear and avoiding flats, slippers, sandals, and going barefoot.   We also discussed cortisone injections and NSAID therapy.   She defers injection today.  Since Mobic is not helping, we will try a medrol dosepack.     RTC in 6-8 weeks if no improvement.   Patient is amenable to plan.

## 2019-06-07 DIAGNOSIS — Z12.11 COLON CANCER SCREENING: ICD-10-CM

## 2019-10-10 DIAGNOSIS — I10 HYPERTENSION, ESSENTIAL: ICD-10-CM

## 2019-10-10 RX ORDER — HYDROCHLOROTHIAZIDE 12.5 MG/1
CAPSULE ORAL
Qty: 30 CAPSULE | Refills: 0 | Status: SHIPPED | OUTPATIENT
Start: 2019-10-10 | End: 2020-11-24

## 2019-11-20 ENCOUNTER — OFFICE VISIT (OUTPATIENT)
Dept: INTERNAL MEDICINE | Facility: CLINIC | Age: 49
End: 2019-11-20
Attending: FAMILY MEDICINE
Payer: MEDICAID

## 2019-11-20 VITALS
HEIGHT: 63 IN | OXYGEN SATURATION: 98 % | HEART RATE: 100 BPM | WEIGHT: 209 LBS | BODY MASS INDEX: 37.03 KG/M2 | DIASTOLIC BLOOD PRESSURE: 86 MMHG | SYSTOLIC BLOOD PRESSURE: 128 MMHG

## 2019-11-20 DIAGNOSIS — I10 HYPERTENSION, ESSENTIAL: ICD-10-CM

## 2019-11-20 DIAGNOSIS — N12 PYELONEPHRITIS: Primary | ICD-10-CM

## 2019-11-20 PROCEDURE — 87086 URINE CULTURE/COLONY COUNT: CPT

## 2019-11-20 PROCEDURE — 87077 CULTURE AEROBIC IDENTIFY: CPT

## 2019-11-20 PROCEDURE — 87186 SC STD MICRODIL/AGAR DIL: CPT

## 2019-11-20 PROCEDURE — 99999 PR PBB SHADOW E&M-EST. PATIENT-LVL III: ICD-10-PCS | Mod: PBBFAC,,, | Performed by: FAMILY MEDICINE

## 2019-11-20 PROCEDURE — 99213 OFFICE O/P EST LOW 20 MIN: CPT | Mod: PBBFAC | Performed by: FAMILY MEDICINE

## 2019-11-20 PROCEDURE — 99215 PR OFFICE/OUTPT VISIT, EST, LEVL V, 40-54 MIN: ICD-10-PCS | Mod: S$PBB,,, | Performed by: FAMILY MEDICINE

## 2019-11-20 PROCEDURE — 99999 PR PBB SHADOW E&M-EST. PATIENT-LVL III: CPT | Mod: PBBFAC,,, | Performed by: FAMILY MEDICINE

## 2019-11-20 PROCEDURE — 99215 OFFICE O/P EST HI 40 MIN: CPT | Mod: S$PBB,,, | Performed by: FAMILY MEDICINE

## 2019-11-20 PROCEDURE — 87088 URINE BACTERIA CULTURE: CPT

## 2019-11-20 NOTE — PROGRESS NOTES
"CHIEF COMPLAINT: The patient presents with a recent admission for pyelonephritis    HISTORY OF PRESENT ILLNESS: The patient presents with a recent admission to [facility] for pyelonephritis.  She was seen and had elevated fever and white count.  Her blood culture became positive soon after discharge from the emergency department.  She was subsequently determined to have E coli bacteremia and positive urine culture.  She completed a 7 day course of antibiotics.    She works as a .    BP is good.     REVIEW OF SYSTEMS:  GENERAL: No fatigability or weight loss.  SKIN: No rashes, itching or changes in color or texture of skin.  HEAD: No headaches or recent head trauma.  EYES: Visual acuity fine. No photophobia, ocular pain or diplopia.  EARS: Denies ear pain, discharge or vertigo.  NOSE: No loss of smell, no epistaxis or postnasal drip.  MOUTH & THROAT: No hoarseness or change in voice. No excessive gum bleeding.  NODES: Denies swollen glands.  CHEST: Denies SANTOS, cyanosis, wheezing, and sputum production.  CARDIOVASCULAR: Denies chest pain, PND, orthopnea or reduced exercise tolerance.  ABDOMEN: Appetite fine. No weight loss. Denies diarrhea, abdominal pain, hematemesis or blood in stool.  URINARY: No flank pain, dysuria or hematuria.  PERIPHERAL VASCULAR: No claudication or cyanosis.  MUSCULOSKELETAL: No joint stiffness or swelling. Denies back pain. Except as above  NEUROLOGIC: No history of seizures, paralysis, alteration of gait or coordination.    SOCIAL HISTORY: Unchanged since recent note    PHYSICAL EXAMINATION:   Blood pressure 128/86, pulse 100, height 5' 3" (1.6 m), weight 94.8 kg (208 lb 15.9 oz), SpO2 98 %.    APPEARANCE: Well nourished, well developed, in no acute distress.    HEAD: Normocephalic, atraumatic.  EYES: PERRL. EOMI.  Conjunctivae without injection and  anicteric  EARS: TM's intact. Light reflex normal. No retraction or perforation.    NOSE: Mucosa pink. Airway clear.  MOUTH & " THROAT: No tonsillar enlargement. No pharyngeal erythema or exudate. No stridor.  NECK: Supple.   NODES: No cervical, axillary or inguinal lymph node enlargement.  CHEST: Lungs demonstrate scattered mild wheezes bilaterally.  No retractions are noted.  No rales or rhonchi are present.  CARDIOVASCULAR: Normal S1, S2. No rubs, murmurs or gallops.  ABDOMEN: Bowel sounds normal. Not distended. Soft. No tenderness or masses.  No ascites is noted.  MUSCULOSKELETAL:  There is no clubbing, cyanosis, or edema of the extremities x4.  There is full range of motion of the lumbar spine.  There is full range of motion of the extremities x4.  There is no deformity noted.    NEUROLOGIC:       Normal speech development.      Hearing normal.      Normal gait.      Motor and sensory exams grossly normal.  PSYCHIATRIC: Patient is alert and oriented x3.  Thought processes are all normal.  There is no homicidality.  There is no suicidality.  There is no evidence of psychosis.    LABORATORY/RADIOLOGY: Chart reviewed.    ASSESSMENT:   Pyelonephritis with positive blood culture  NSAID gastritis   HTN    PLAN:  Follow-up urine culture which we expect to be negative  Continue omeprazole  HCTZ  Podiatry referral  RTC 1 year

## 2019-11-25 DIAGNOSIS — N39.0 URINARY TRACT INFECTION WITHOUT HEMATURIA, SITE UNSPECIFIED: Primary | ICD-10-CM

## 2019-11-25 LAB — BACTERIA UR CULT: ABNORMAL

## 2019-11-25 NOTE — TELEPHONE ENCOUNTER
Patient will  medication from preferred pharmacy tomorrow and will repeat urine in 3 weeks.  ----- Message from Jag Lake MD sent at 11/25/2019  4:14 PM CST -----  Unfortunately it appears she still has a bladder infection.  She should do well with Macrobid 100 mg b.i.d. for 10 days.  In about 3 weeks from today we should repeat another urine culture

## 2019-11-25 NOTE — TELEPHONE ENCOUNTER
Left message for patient to call me back.  ----- Message from Jag Lake MD sent at 11/25/2019  4:14 PM CST -----  Unfortunately it appears she still has a bladder infection.  She should do well with Macrobid 100 mg b.i.d. for 10 days.  In about 3 weeks from today we should repeat another urine culture

## 2019-11-26 RX ORDER — NITROFURANTOIN (MACROCRYSTALS) 100 MG/1
100 CAPSULE ORAL 2 TIMES DAILY
Qty: 20 CAPSULE | Refills: 0 | Status: SHIPPED | OUTPATIENT
Start: 2019-11-26 | End: 2019-12-06

## 2020-01-24 DIAGNOSIS — Z12.39 BREAST CANCER SCREENING: ICD-10-CM

## 2020-01-28 ENCOUNTER — PATIENT OUTREACH (OUTPATIENT)
Dept: ADMINISTRATIVE | Facility: HOSPITAL | Age: 50
End: 2020-01-28

## 2020-01-28 DIAGNOSIS — Z12.11 SCREENING FOR COLON CANCER: Primary | ICD-10-CM

## 2020-01-28 DIAGNOSIS — Z12.4 PAP SMEAR FOR CERVICAL CANCER SCREENING: ICD-10-CM

## 2020-01-28 DIAGNOSIS — E11.9 DIABETES MELLITUS WITHOUT COMPLICATION: ICD-10-CM

## 2020-03-23 ENCOUNTER — TELEPHONE (OUTPATIENT)
Dept: INTERNAL MEDICINE | Facility: CLINIC | Age: 50
End: 2020-03-23

## 2020-03-23 RX ORDER — HYDROCHLOROTHIAZIDE 12.5 MG/1
12.5 CAPSULE ORAL DAILY PRN
Qty: 30 CAPSULE | Refills: 0 | Status: SHIPPED | OUTPATIENT
Start: 2020-03-23 | End: 2020-07-13 | Stop reason: SDUPTHER

## 2020-03-23 NOTE — TELEPHONE ENCOUNTER
----- Message from Azucena Marcus sent at 3/23/2020  2:07 PM CDT -----  Contact: KATELYNN LUCERO [[id number]]  Can the clinic reply in [facility]: no    Please refill the medication(s) listed below. The patient can be reached at this phone number once it is called into the pharmacy [phone]    Medication #1: hydroCHLOROthiazide (MICROZIDE) 12.5 mg capsule    Preferred Pharmacy: [facility] #[id number] - [place], [place] - [address] [place] [address] AVE AT [address]

## 2020-07-13 DIAGNOSIS — I10 HYPERTENSION, ESSENTIAL: Primary | ICD-10-CM

## 2020-07-13 RX ORDER — HYDROCHLOROTHIAZIDE 12.5 MG/1
12.5 CAPSULE ORAL DAILY PRN
Qty: 30 CAPSULE | Refills: 0 | Status: SHIPPED | OUTPATIENT
Start: 2020-07-13 | End: 2020-11-24

## 2020-07-13 NOTE — TELEPHONE ENCOUNTER
----- Message from Ehsan Westbrook, Patient Care Assistant sent at 7/13/2020 11:44 AM CDT -----  Can the clinic reply in [facility]: No    Please refill the medication(s) listed below. The patient can be reached at this phone number once it is called into the pharmacy.[phone]    Medication #1hydroCHLOROthiazide (MICROZIDE) 12.5 mg capsule    Medication #2    Preferred Pharmacy:   [facility] #[id number] - [place], [place] - [address]

## 2020-09-01 ENCOUNTER — TELEPHONE (OUTPATIENT)
Dept: INTERNAL MEDICINE | Facility: CLINIC | Age: 50
End: 2020-09-01

## 2020-09-01 NOTE — TELEPHONE ENCOUNTER
Daryl Abernathy    This is Ceyonne from Dr. Jag Lake MD office. We are reaching out because our records shows that you are due for a mammogram. Dr. Jag Lake MD would like for you to get this done as soon as possible. Can you please call the office back at [phone] or imaging department [phone] to get your mammogram schedule.   lvm for pt to call office back in regards of

## 2020-10-01 ENCOUNTER — PATIENT MESSAGE (OUTPATIENT)
Dept: ADMINISTRATIVE | Facility: HOSPITAL | Age: 50
End: 2020-10-01

## 2020-10-01 ENCOUNTER — PATIENT OUTREACH (OUTPATIENT)
Dept: ADMINISTRATIVE | Facility: HOSPITAL | Age: 50
End: 2020-10-01

## 2020-10-07 ENCOUNTER — PATIENT MESSAGE (OUTPATIENT)
Dept: ADMINISTRATIVE | Facility: HOSPITAL | Age: 50
End: 2020-10-07

## 2020-10-27 ENCOUNTER — TELEPHONE (OUTPATIENT)
Dept: INTERNAL MEDICINE | Facility: CLINIC | Age: 50
End: 2020-10-27

## 2020-10-27 NOTE — TELEPHONE ENCOUNTER
----- Message from Latisha Hernandez sent at 10/26/2020  3:27 PM CDT -----  Regarding: Patient Call Back  Who Called:KATELYNN LUCERO [[id number]]    What is the request in detail: Would like a call back in regards to being seen sooner for a pain under her arm    Can the clinic reply by  [facility]?No     Best Call Back Number:[phone]

## 2020-10-27 NOTE — TELEPHONE ENCOUNTER
lov 11/2019  Spoke to Pt and scheduled her to see other provider for Friday since PCP does not have availability this week  And pt is only available Thursday afternoon and Friday anytime  Complains of left shoulder pain

## 2020-10-30 ENCOUNTER — HOSPITAL ENCOUNTER (OUTPATIENT)
Dept: RADIOLOGY | Facility: HOSPITAL | Age: 50
Discharge: HOME | End: 2020-10-30
Attending: FAMILY MEDICINE
Payer: MEDICAID

## 2020-10-30 ENCOUNTER — OFFICE VISIT (OUTPATIENT)
Dept: INTERNAL MEDICINE | Facility: CLINIC | Age: 50
End: 2020-10-30
Payer: MEDICAID

## 2020-10-30 ENCOUNTER — PATIENT OUTREACH (OUTPATIENT)
Dept: ADMINISTRATIVE | Facility: HOSPITAL | Age: 50
End: 2020-10-30

## 2020-10-30 VITALS
DIASTOLIC BLOOD PRESSURE: 80 MMHG | HEART RATE: 73 BPM | HEIGHT: 63 IN | WEIGHT: 227.5 LBS | BODY MASS INDEX: 40.31 KG/M2 | SYSTOLIC BLOOD PRESSURE: 122 MMHG | OXYGEN SATURATION: 99 %

## 2020-10-30 DIAGNOSIS — Z11.59 NEED FOR HEPATITIS C SCREENING TEST: Primary | ICD-10-CM

## 2020-10-30 DIAGNOSIS — S46.912A STRAIN OF LEFT SHOULDER, INITIAL ENCOUNTER: Primary | ICD-10-CM

## 2020-10-30 DIAGNOSIS — S46.912A STRAIN OF LEFT SHOULDER, INITIAL ENCOUNTER: ICD-10-CM

## 2020-10-30 PROCEDURE — 99214 PR OFFICE/OUTPT VISIT, EST, LEVL IV, 30-39 MIN: ICD-10-PCS | Mod: S$PBB,,, | Performed by: FAMILY MEDICINE

## 2020-10-30 PROCEDURE — 99214 OFFICE O/P EST MOD 30 MIN: CPT | Mod: S$PBB,,, | Performed by: FAMILY MEDICINE

## 2020-10-30 PROCEDURE — 73030 XR SHOULDER COMPLETE 2 OR MORE VIEWS LEFT: ICD-10-PCS | Mod: 26,LT,, | Performed by: RADIOLOGY

## 2020-10-30 PROCEDURE — 73030 X-RAY EXAM OF SHOULDER: CPT | Mod: TC,LT

## 2020-10-30 PROCEDURE — 99999 PR PBB SHADOW E&M-EST. PATIENT-LVL IV: ICD-10-PCS | Mod: PBBFAC,,, | Performed by: FAMILY MEDICINE

## 2020-10-30 PROCEDURE — 99999 PR PBB SHADOW E&M-EST. PATIENT-LVL IV: CPT | Mod: PBBFAC,,, | Performed by: FAMILY MEDICINE

## 2020-10-30 PROCEDURE — 99214 OFFICE O/P EST MOD 30 MIN: CPT | Mod: PBBFAC | Performed by: FAMILY MEDICINE

## 2020-10-30 PROCEDURE — 73030 X-RAY EXAM OF SHOULDER: CPT | Mod: 26,LT,, | Performed by: RADIOLOGY

## 2020-10-30 RX ORDER — MELOXICAM 15 MG/1
15 TABLET ORAL DAILY
Qty: 30 TABLET | Refills: 1 | Status: SHIPPED | OUTPATIENT
Start: 2020-10-30 | End: 2021-10-27

## 2020-10-30 NOTE — PATIENT INSTRUCTIONS
Shoulder Sprain  A sprain is a stretching or tearing of the ligaments that hold a joint together. A sprain may take up to 8 weeks to fully heal, depending on how severe it is. Moderate to severe shoulder sprains are treated with a sling or shoulder immobilizer. Minor sprains can be treated without any special support.  Home care  The following guidelines will help you care for your injury at home:  · If a sling was given to you, leave it in place for the time advised by your healthcare provider. If you arent sure how long to wear it, ask for advice. If the sling becomes loose, adjust it so that your forearm is level with the ground. Your shoulder should feel well supported.  · Put an ice pack on the injured area for 20 minutes every 1 to 2 hours the first day. You can make your own ice pack by putting ice cubes in a plastic bag. A bag of frozen peas or something similar works well too. Wrap the bag in a thin towel. Continue with ice packs 3 to 4 times a day for the next 2 to 3 days. Then use the pack as needed to ease pain and swelling.  · You may use acetaminophen or ibuprofen to control pain, unless another pain medicine was prescribed. If you have chronic liver or kidney disease, talk with your healthcare provider before using these medicines. Also talk with your provider if youve had a stomach ulcer or gastrointestinal bleeding.  · Shoulder joints become stiff if left in a sling for too long. You should start range of motion exercises about 7 to 10 days after the injury. Talk with your provider to find out what type of exercises to do and how soon to start.  Follow-up care  Follow up with your healthcare provider, or as advised.  Any X-rays you had today dont show any broken bones, breaks, or fractures. Sometimes fractures dont show up on the first X-ray. Bruises and sprains can sometimes hurt as much as a fracture. These injuries can take time to heal completely. If your symptoms dont improve or they get  worse, talk with your provider. You may need a repeat X-ray or other treatments.  When to seek medical advice  Call your healthcare provider right away if any of these occur:  · Shoulder pain or swelling in your arm that gets worse  · Fingers become cold, blue, numb, or tingly  · Large amount of bruising of the shoulder or upper arm  · Fever or chills  Date Last Reviewed: 8/1/2016  © 3309-3869 [employer]. [address] [place] [address], [place] [address]. All rights reserved. This information is not intended as a substitute for professional medical care. Always follow your healthcare professional's instructions.

## 2020-10-30 NOTE — PROGRESS NOTES
"Subjective:       Patient ID: Nadir Abernathy is a 50 y.o. female.    Chief Complaint:   Shoulder Pain (left pain =6)    Shoulder Pain   The pain is present in the left shoulder. This is a new problem. Episode onset: 9/28/2020. There has been a history of trauma (slipped in the bathroom.  Fell and hit her shoulder against the baseboard.  Shoulder didn't start hurting until later that day). The problem occurs constantly. The problem has been gradually worsening. The pain is moderate. Associated symptoms include a fever. The symptoms are aggravated by activity. Treatments tried: Goody powders. The treatment provided mild relief.     Review of Systems   Constitutional: Positive for fever.     Current Outpatient Medications   Medication Sig    hydroCHLOROthiazide (MICROZIDE) 12.5 mg capsule TAKE 1 CAPSULE(12.5 MG) BY MOUTH DAILY AS NEEDED FOR SWELLING    ondansetron (ZOFRAN-ODT) 4 MG TbDL Take 1 tablet (4 mg total) by mouth every 8 (eight) hours as needed (nausea).    hydroCHLOROthiazide (MICROZIDE) 12.5 mg capsule Take 1 capsule (12.5 mg total) by mouth daily as needed (leg swelling).    meloxicam (MOBIC) 15 MG tablet Take 1 tablet (15 mg total) by mouth once daily.    omeprazole (PRILOSEC) 20 MG capsule Take 1 capsule (20 mg total) by mouth once daily.     No current facility-administered medications for this visit.      Past Medical History:   Diagnosis Date    Fibroids 1995     Family History   Problem Relation Age of Onset    Diabetes Father      Social History     Tobacco Use    Smoking status: Never Smoker    Smokeless tobacco: Never Used   Substance Use Topics    Alcohol use: Yes     Alcohol/week: 0.0 standard drinks    Drug use: No       Objective:      Vitals:    10/30/20 0953   BP: 122/80   BP Location: Right arm   Patient Position: Sitting   BP Method: Large (Manual)   Pulse: 73   SpO2: 99%   Weight: 103.2 kg (227 lb 8.2 oz)   Height: 5' 3" (1.6 m)     Physical Exam  Vitals signs and nursing note " reviewed.   Constitutional:       General: She is not in acute distress.     Appearance: She is well-developed. She is not diaphoretic.   HENT:      Head: Normocephalic and atraumatic.   Eyes:      Conjunctiva/sclera: Conjunctivae normal.   Neck:      Musculoskeletal: Normal range of motion and neck supple.   Cardiovascular:      Rate and Rhythm: Normal rate and regular rhythm.      Heart sounds: Normal heart sounds. No murmur. No friction rub. No gallop.    Pulmonary:      Effort: Pulmonary effort is normal.      Breath sounds: Normal breath sounds. No wheezing or rales.   Musculoskeletal:         General: No deformity.      Comments: L shoulder - mild TTP anteriorly.  Will only abduct to 80 degrees secondary to pain.  Pain with rotator cuff strength testing.  Radial pulses 2+,=.   5/5 bilaterally.  Sensation intact to LT.   Skin:     General: Skin is warm and dry.   Neurological:      Mental Status: She is alert and oriented to person, place, and time.   Psychiatric:         Behavior: Behavior normal.              Assessment and Plan:     Strain of left shoulder, initial encounter  -     X-Ray Shoulder 2 or More Views Left; Future; Expected date: 10/30/2020  -     meloxicam (MOBIC) 15 MG tablet; Take 1 tablet (15 mg total) by mouth once daily.  Dispense: 30 tablet; Refill: 1          Follow up in about 2 weeks (around 11/13/2020).      Cal Abdul MD

## 2020-11-05 ENCOUNTER — HOSPITAL ENCOUNTER (OUTPATIENT)
Dept: RADIOLOGY | Facility: OTHER | Age: 50
Discharge: HOME | End: 2020-11-05
Attending: FAMILY MEDICINE
Payer: MEDICAID

## 2020-11-05 DIAGNOSIS — Z12.39 BREAST CANCER SCREENING: ICD-10-CM

## 2020-11-05 PROCEDURE — 77067 SCR MAMMO BI INCL CAD: CPT | Mod: 26,,, | Performed by: RADIOLOGY

## 2020-11-05 PROCEDURE — 77063 MAMMO DIGITAL SCREENING BILAT WITH TOMOSYNTHESIS_CAD: ICD-10-PCS | Mod: 26,,, | Performed by: RADIOLOGY

## 2020-11-05 PROCEDURE — 77063 BREAST TOMOSYNTHESIS BI: CPT | Mod: 26,,, | Performed by: RADIOLOGY

## 2020-11-05 PROCEDURE — 77067 MAMMO DIGITAL SCREENING BILAT WITH TOMOSYNTHESIS_CAD: ICD-10-PCS | Mod: 26,,, | Performed by: RADIOLOGY

## 2020-11-05 PROCEDURE — 77067 SCR MAMMO BI INCL CAD: CPT | Mod: TC

## 2020-11-24 DIAGNOSIS — I10 HYPERTENSION, ESSENTIAL: ICD-10-CM

## 2020-11-24 NOTE — TELEPHONE ENCOUNTER
----- Message from Arin Minor sent at 11/24/2020  3:52 PM CST -----  Type: RX Refill Request    Who Called: pt     Have you contacted your pharmacy: no     Refill or New Rx: refill     RX Name and Strength: hydroCHLOROthiazide (MICROZIDE) 12.5 mg capsule    How is the patient currently taking it? (ex. 1XDay):    Is this a 30 day or 90 day RX:    Preferred Pharmacy with phone number:   [facility] #[id number] - [place], [place] - [address]  [address] [facility] [address]  Phone: [phone] Fax: [phone]    Local or Mail Order:    Ordering Provider:    Would the patient rather a call back or a response via [facility]? Call back     Best Call Back Number: [phone] (mobile)    Additional Information:       Thank you

## 2020-11-25 RX ORDER — HYDROCHLOROTHIAZIDE 12.5 MG/1
12.5 CAPSULE ORAL DAILY PRN
Qty: 30 CAPSULE | Refills: 0 | Status: SHIPPED | OUTPATIENT
Start: 2020-11-25 | End: 2021-05-18 | Stop reason: SDUPTHER

## 2020-12-31 ENCOUNTER — PATIENT OUTREACH (OUTPATIENT)
Dept: ADMINISTRATIVE | Facility: OTHER | Age: 50
End: 2020-12-31

## 2021-01-01 NOTE — PROGRESS NOTES
Health Maintenance Due   Topic Date Due    Hepatitis C Screening  1970    HIV Screening  05/23/1983    TETANUS VACCINE  05/23/1986    Cervical Cancer Screening  05/23/1989    Hemoglobin A1c  04/04/2018    Shingles Vaccine (1 of 2) 05/23/2018    Colorectal Cancer Screening  05/23/2018     Updates were requested from care everywhere.  Chart was reviewed for overdue Proactive [facility] Encounters ([facility]) topics (CRS, Breast Cancer Screening, Eye exam)  Health Maintenance has been updated.  LINKS immunization registry triggered.  Immunizations were reconciled.  Sent message to patient about pap smear

## 2021-01-29 ENCOUNTER — PATIENT MESSAGE (OUTPATIENT)
Dept: ADMINISTRATIVE | Facility: HOSPITAL | Age: 51
End: 2021-01-29

## 2021-02-09 ENCOUNTER — PATIENT MESSAGE (OUTPATIENT)
Dept: ADMINISTRATIVE | Facility: HOSPITAL | Age: 51
End: 2021-02-09

## 2021-02-09 ENCOUNTER — PATIENT OUTREACH (OUTPATIENT)
Dept: ADMINISTRATIVE | Facility: HOSPITAL | Age: 51
End: 2021-02-09

## 2021-04-26 ENCOUNTER — PATIENT MESSAGE (OUTPATIENT)
Dept: RESEARCH | Facility: HOSPITAL | Age: 51
End: 2021-04-26

## 2021-05-18 DIAGNOSIS — I10 HYPERTENSION, ESSENTIAL: ICD-10-CM

## 2021-05-18 RX ORDER — HYDROCHLOROTHIAZIDE 12.5 MG/1
12.5 CAPSULE ORAL DAILY PRN
Qty: 30 CAPSULE | Refills: 0 | Status: SHIPPED | OUTPATIENT
Start: 2021-05-18 | End: 2021-09-17 | Stop reason: SDUPTHER

## 2021-05-24 ENCOUNTER — TELEPHONE (OUTPATIENT)
Dept: INTERNAL MEDICINE | Facility: CLINIC | Age: 51
End: 2021-05-24

## 2021-06-01 ENCOUNTER — OFFICE VISIT (OUTPATIENT)
Dept: INTERNAL MEDICINE | Facility: CLINIC | Age: 51
End: 2021-06-01
Attending: FAMILY MEDICINE
Payer: MEDICAID

## 2021-06-01 ENCOUNTER — LAB VISIT (OUTPATIENT)
Dept: LAB | Facility: OTHER | Age: 51
End: 2021-06-01
Attending: FAMILY MEDICINE
Payer: MEDICAID

## 2021-06-01 VITALS
BODY MASS INDEX: 38.7 KG/M2 | HEART RATE: 70 BPM | SYSTOLIC BLOOD PRESSURE: 112 MMHG | DIASTOLIC BLOOD PRESSURE: 68 MMHG | OXYGEN SATURATION: 95 % | WEIGHT: 218.5 LBS

## 2021-06-01 DIAGNOSIS — Z00.00 PREVENTATIVE HEALTH CARE: ICD-10-CM

## 2021-06-01 DIAGNOSIS — I10 HYPERTENSION, ESSENTIAL: Primary | ICD-10-CM

## 2021-06-01 DIAGNOSIS — I10 HYPERTENSION, ESSENTIAL: ICD-10-CM

## 2021-06-01 LAB
ALBUMIN SERPL BCP-MCNC: 3.6 G/DL (ref 3.5–5.2)
ALP SERPL-CCNC: 65 U/L (ref 55–135)
ALT SERPL W/O P-5'-P-CCNC: 16 U/L (ref 10–44)
ANION GAP SERPL CALC-SCNC: 11 MMOL/L (ref 8–16)
AST SERPL-CCNC: 20 U/L (ref 10–40)
BILIRUB SERPL-MCNC: 0.3 MG/DL (ref 0.1–1)
BUN SERPL-MCNC: 8 MG/DL (ref 6–20)
CALCIUM SERPL-MCNC: 9.7 MG/DL (ref 8.7–10.5)
CHLORIDE SERPL-SCNC: 104 MMOL/L (ref 95–110)
CO2 SERPL-SCNC: 25 MMOL/L (ref 23–29)
CREAT SERPL-MCNC: 0.7 MG/DL (ref 0.5–1.4)
EST. GFR  (AFRICAN AMERICAN): >60 ML/MIN/1.73 M^2
EST. GFR  (NON AFRICAN AMERICAN): >60 ML/MIN/1.73 M^2
ESTIMATED AVG GLUCOSE: 100 MG/DL (ref 68–131)
GLUCOSE SERPL-MCNC: 82 MG/DL (ref 70–110)
HBA1C MFR BLD: 5.1 % (ref 4–5.6)
POTASSIUM SERPL-SCNC: 4.3 MMOL/L (ref 3.5–5.1)
PROT SERPL-MCNC: 8.2 G/DL (ref 6–8.4)
SODIUM SERPL-SCNC: 140 MMOL/L (ref 136–145)
TSH SERPL DL<=0.005 MIU/L-ACNC: 1.03 UIU/ML (ref 0.4–4)

## 2021-06-01 PROCEDURE — 84443 ASSAY THYROID STIM HORMONE: CPT | Performed by: FAMILY MEDICINE

## 2021-06-01 PROCEDURE — 99213 OFFICE O/P EST LOW 20 MIN: CPT | Mod: PBBFAC | Performed by: FAMILY MEDICINE

## 2021-06-01 PROCEDURE — 80053 COMPREHEN METABOLIC PANEL: CPT | Performed by: FAMILY MEDICINE

## 2021-06-01 PROCEDURE — 99999 PR PBB SHADOW E&M-EST. PATIENT-LVL III: ICD-10-PCS | Mod: PBBFAC,,, | Performed by: FAMILY MEDICINE

## 2021-06-01 PROCEDURE — 99999 PR PBB SHADOW E&M-EST. PATIENT-LVL III: CPT | Mod: PBBFAC,,, | Performed by: FAMILY MEDICINE

## 2021-06-01 PROCEDURE — 80061 LIPID PANEL: CPT | Performed by: FAMILY MEDICINE

## 2021-06-01 PROCEDURE — 99214 OFFICE O/P EST MOD 30 MIN: CPT | Mod: S$PBB,,, | Performed by: FAMILY MEDICINE

## 2021-06-01 PROCEDURE — 99214 PR OFFICE/OUTPT VISIT, EST, LEVL IV, 30-39 MIN: ICD-10-PCS | Mod: S$PBB,,, | Performed by: FAMILY MEDICINE

## 2021-06-01 PROCEDURE — 83036 HEMOGLOBIN GLYCOSYLATED A1C: CPT | Performed by: FAMILY MEDICINE

## 2021-06-01 PROCEDURE — 36415 COLL VENOUS BLD VENIPUNCTURE: CPT | Performed by: FAMILY MEDICINE

## 2021-06-01 RX ORDER — MONTELUKAST SODIUM 10 MG/1
10 TABLET ORAL NIGHTLY
Qty: 30 TABLET | Refills: 11 | Status: SHIPPED | OUTPATIENT
Start: 2021-06-01 | End: 2021-07-01

## 2021-06-02 LAB
CHOLEST SERPL-MCNC: 232 MG/DL (ref 120–199)
CHOLEST/HDLC SERPL: 2.9 {RATIO} (ref 2–5)
HDLC SERPL-MCNC: 80 MG/DL (ref 40–75)
HDLC SERPL: 34.5 % (ref 20–50)
LDLC SERPL CALC-MCNC: 134.4 MG/DL (ref 63–159)
NONHDLC SERPL-MCNC: 152 MG/DL
TRIGL SERPL-MCNC: 88 MG/DL (ref 30–150)

## 2021-06-04 ENCOUNTER — TELEPHONE (OUTPATIENT)
Dept: INTERNAL MEDICINE | Facility: CLINIC | Age: 51
End: 2021-06-04

## 2021-07-01 ENCOUNTER — PATIENT OUTREACH (OUTPATIENT)
Dept: ADMINISTRATIVE | Facility: OTHER | Age: 51
End: 2021-07-01

## 2021-07-01 DIAGNOSIS — Z12.11 ENCOUNTER FOR FIT (FECAL IMMUNOCHEMICAL TEST) SCREENING: Primary | ICD-10-CM

## 2021-07-06 ENCOUNTER — HOSPITAL ENCOUNTER (OUTPATIENT)
Dept: RADIOLOGY | Facility: OTHER | Age: 51
Discharge: HOME | End: 2021-07-06
Attending: NURSE PRACTITIONER
Payer: MEDICAID

## 2021-07-06 ENCOUNTER — OFFICE VISIT (OUTPATIENT)
Dept: OBSTETRICS AND GYNECOLOGY | Facility: CLINIC | Age: 51
End: 2021-07-06
Attending: FAMILY MEDICINE
Payer: MEDICAID

## 2021-07-06 VITALS
SYSTOLIC BLOOD PRESSURE: 124 MMHG | HEIGHT: 63 IN | WEIGHT: 220.69 LBS | DIASTOLIC BLOOD PRESSURE: 80 MMHG | BODY MASS INDEX: 39.1 KG/M2

## 2021-07-06 DIAGNOSIS — Z86.018 HISTORY OF UTERINE FIBROID: ICD-10-CM

## 2021-07-06 DIAGNOSIS — Z01.411 ENCOUNTER FOR WELL WOMAN EXAM WITH ABNORMAL FINDINGS: Primary | ICD-10-CM

## 2021-07-06 DIAGNOSIS — N85.2 ENLARGED UTERUS: ICD-10-CM

## 2021-07-06 DIAGNOSIS — Z12.4 PAP SMEAR FOR CERVICAL CANCER SCREENING: ICD-10-CM

## 2021-07-06 PROCEDURE — 99386 PREV VISIT NEW AGE 40-64: CPT | Mod: S$PBB,,, | Performed by: NURSE PRACTITIONER

## 2021-07-06 PROCEDURE — 99386 PR PREVENTIVE VISIT,NEW,40-64: ICD-10-PCS | Mod: S$PBB,,, | Performed by: NURSE PRACTITIONER

## 2021-07-06 PROCEDURE — 76830 US PELVIS COMP WITH TRANSVAG NON-OB (XPD): ICD-10-PCS | Mod: 26,,, | Performed by: RADIOLOGY

## 2021-07-06 PROCEDURE — 87624 HPV HI-RISK TYP POOLED RSLT: CPT | Performed by: NURSE PRACTITIONER

## 2021-07-06 PROCEDURE — 88175 CYTOPATH C/V AUTO FLUID REDO: CPT | Performed by: NURSE PRACTITIONER

## 2021-07-06 PROCEDURE — 99999 PR PBB SHADOW E&M-EST. PATIENT-LVL III: ICD-10-PCS | Mod: PBBFAC,,, | Performed by: NURSE PRACTITIONER

## 2021-07-06 PROCEDURE — 99999 PR PBB SHADOW E&M-EST. PATIENT-LVL III: CPT | Mod: PBBFAC,,, | Performed by: NURSE PRACTITIONER

## 2021-07-06 PROCEDURE — 76856 US EXAM PELVIC COMPLETE: CPT | Mod: 26,,, | Performed by: RADIOLOGY

## 2021-07-06 PROCEDURE — 76830 TRANSVAGINAL US NON-OB: CPT | Mod: 26,,, | Performed by: RADIOLOGY

## 2021-07-06 PROCEDURE — 99213 OFFICE O/P EST LOW 20 MIN: CPT | Mod: PBBFAC,25 | Performed by: NURSE PRACTITIONER

## 2021-07-06 PROCEDURE — 76856 US PELVIS COMP WITH TRANSVAG NON-OB (XPD): ICD-10-PCS | Mod: 26,,, | Performed by: RADIOLOGY

## 2021-07-06 PROCEDURE — 76856 US EXAM PELVIC COMPLETE: CPT | Mod: TC

## 2021-07-07 ENCOUNTER — TELEPHONE (OUTPATIENT)
Dept: OBSTETRICS AND GYNECOLOGY | Facility: CLINIC | Age: 51
End: 2021-07-07

## 2021-07-12 LAB
FINAL PATHOLOGIC DIAGNOSIS: NORMAL
Lab: NORMAL

## 2021-07-14 ENCOUNTER — TELEPHONE (OUTPATIENT)
Dept: OBSTETRICS AND GYNECOLOGY | Facility: CLINIC | Age: 51
End: 2021-07-14

## 2021-07-14 LAB
HPV HR 12 DNA SPEC QL NAA+PROBE: NEGATIVE
HPV16 AG SPEC QL: NEGATIVE
HPV18 DNA SPEC QL NAA+PROBE: NEGATIVE

## 2021-09-17 DIAGNOSIS — I10 HYPERTENSION, ESSENTIAL: ICD-10-CM

## 2021-09-20 RX ORDER — HYDROCHLOROTHIAZIDE 12.5 MG/1
12.5 CAPSULE ORAL DAILY PRN
Qty: 30 CAPSULE | Refills: 0 | Status: SHIPPED | OUTPATIENT
Start: 2021-09-20 | End: 2022-03-14 | Stop reason: SDUPTHER

## 2021-09-22 ENCOUNTER — TELEPHONE (OUTPATIENT)
Dept: INTERNAL MEDICINE | Facility: CLINIC | Age: 51
End: 2021-09-22

## 2021-09-22 RX ORDER — MONTELUKAST SODIUM 10 MG/1
10 TABLET ORAL NIGHTLY
Qty: 30 TABLET | Refills: 11 | Status: SHIPPED | OUTPATIENT
Start: 2021-09-22 | End: 2021-10-22

## 2021-10-05 ENCOUNTER — PATIENT MESSAGE (OUTPATIENT)
Dept: ADMINISTRATIVE | Facility: HOSPITAL | Age: 51
End: 2021-10-05

## 2021-10-12 ENCOUNTER — TELEPHONE (OUTPATIENT)
Dept: INTERNAL MEDICINE | Facility: CLINIC | Age: 51
End: 2021-10-12

## 2021-10-14 ENCOUNTER — OFFICE VISIT (OUTPATIENT)
Dept: INTERNAL MEDICINE | Facility: CLINIC | Age: 51
End: 2021-10-14
Payer: MEDICAID

## 2021-10-14 VITALS
WEIGHT: 217.63 LBS | HEART RATE: 82 BPM | BODY MASS INDEX: 38.56 KG/M2 | DIASTOLIC BLOOD PRESSURE: 80 MMHG | SYSTOLIC BLOOD PRESSURE: 126 MMHG | OXYGEN SATURATION: 100 % | HEIGHT: 63 IN

## 2021-10-14 DIAGNOSIS — I10 HYPERTENSION, ESSENTIAL: ICD-10-CM

## 2021-10-14 DIAGNOSIS — M72.2 PLANTAR FASCIITIS: Primary | ICD-10-CM

## 2021-10-14 PROCEDURE — 99999 PR PBB SHADOW E&M-EST. PATIENT-LVL III: ICD-10-PCS | Mod: PBBFAC,,, | Performed by: PHYSICIAN ASSISTANT

## 2021-10-14 PROCEDURE — 99999 PR PBB SHADOW E&M-EST. PATIENT-LVL III: CPT | Mod: PBBFAC,,, | Performed by: PHYSICIAN ASSISTANT

## 2021-10-14 PROCEDURE — 99214 PR OFFICE/OUTPT VISIT, EST, LEVL IV, 30-39 MIN: ICD-10-PCS | Mod: S$PBB,,, | Performed by: PHYSICIAN ASSISTANT

## 2021-10-14 PROCEDURE — 99214 OFFICE O/P EST MOD 30 MIN: CPT | Mod: S$PBB,,, | Performed by: PHYSICIAN ASSISTANT

## 2021-10-14 PROCEDURE — 99213 OFFICE O/P EST LOW 20 MIN: CPT | Mod: PBBFAC | Performed by: PHYSICIAN ASSISTANT

## 2021-10-14 RX ORDER — METHYLPREDNISOLONE 4 MG/1
TABLET ORAL
Qty: 1 PACKAGE | Refills: 0 | Status: SHIPPED | OUTPATIENT
Start: 2021-10-14 | End: 2021-10-21 | Stop reason: SDUPTHER

## 2021-10-18 ENCOUNTER — PATIENT MESSAGE (OUTPATIENT)
Dept: ORTHOPEDICS | Facility: CLINIC | Age: 51
End: 2021-10-18
Payer: MEDICAID

## 2021-10-21 DIAGNOSIS — M72.2 PLANTAR FASCIITIS: Primary | ICD-10-CM

## 2021-10-21 DIAGNOSIS — M79.672 LEFT FOOT PAIN: ICD-10-CM

## 2021-10-21 RX ORDER — METHYLPREDNISOLONE 4 MG/1
TABLET ORAL
Qty: 21 TABLET | Refills: 3 | Status: SHIPPED | OUTPATIENT
Start: 2021-10-21 | End: 2021-11-11

## 2021-10-25 ENCOUNTER — PATIENT MESSAGE (OUTPATIENT)
Dept: ORTHOPEDICS | Facility: CLINIC | Age: 51
End: 2021-10-25
Payer: MEDICAID

## 2021-10-27 ENCOUNTER — OFFICE VISIT (OUTPATIENT)
Dept: PODIATRY | Facility: CLINIC | Age: 51
End: 2021-10-27
Payer: MEDICAID

## 2021-10-27 VITALS
WEIGHT: 222.38 LBS | HEIGHT: 63 IN | BODY MASS INDEX: 39.4 KG/M2 | SYSTOLIC BLOOD PRESSURE: 125 MMHG | HEART RATE: 96 BPM | DIASTOLIC BLOOD PRESSURE: 83 MMHG

## 2021-10-27 DIAGNOSIS — M72.2 PLANTAR FASCIITIS: ICD-10-CM

## 2021-10-27 DIAGNOSIS — M79.672 LEFT FOOT PAIN: ICD-10-CM

## 2021-10-27 PROCEDURE — 99999 PR PBB SHADOW E&M-EST. PATIENT-LVL III: ICD-10-PCS | Mod: PBBFAC,,, | Performed by: PODIATRIST

## 2021-10-27 PROCEDURE — 99214 OFFICE O/P EST MOD 30 MIN: CPT | Mod: S$PBB,,, | Performed by: PODIATRIST

## 2021-10-27 PROCEDURE — 99213 OFFICE O/P EST LOW 20 MIN: CPT | Mod: PBBFAC,PN | Performed by: PODIATRIST

## 2021-10-27 PROCEDURE — 99999 PR PBB SHADOW E&M-EST. PATIENT-LVL III: CPT | Mod: PBBFAC,,, | Performed by: PODIATRIST

## 2021-10-27 PROCEDURE — 99214 PR OFFICE/OUTPT VISIT, EST, LEVL IV, 30-39 MIN: ICD-10-PCS | Mod: S$PBB,,, | Performed by: PODIATRIST

## 2021-10-27 RX ORDER — NABUMETONE 500 MG/1
1000 TABLET, FILM COATED ORAL DAILY
Qty: 60 TABLET | Refills: 1 | Status: SHIPPED | OUTPATIENT
Start: 2021-10-27 | End: 2022-02-08 | Stop reason: SDUPTHER

## 2021-11-17 ENCOUNTER — OFFICE VISIT (OUTPATIENT)
Dept: PODIATRY | Facility: CLINIC | Age: 51
End: 2021-11-17
Payer: MEDICAID

## 2021-11-17 VITALS
WEIGHT: 222 LBS | DIASTOLIC BLOOD PRESSURE: 80 MMHG | SYSTOLIC BLOOD PRESSURE: 137 MMHG | HEART RATE: 84 BPM | BODY MASS INDEX: 39.34 KG/M2 | HEIGHT: 63 IN

## 2021-11-17 DIAGNOSIS — M72.2 PLANTAR FASCIITIS: Primary | ICD-10-CM

## 2021-11-17 DIAGNOSIS — M79.672 PAIN OF LEFT HEEL: ICD-10-CM

## 2021-11-17 PROCEDURE — 99213 OFFICE O/P EST LOW 20 MIN: CPT | Mod: S$PBB,,, | Performed by: PODIATRIST

## 2021-11-17 PROCEDURE — 99999 PR PBB SHADOW E&M-EST. PATIENT-LVL III: CPT | Mod: PBBFAC,,, | Performed by: PODIATRIST

## 2021-11-17 PROCEDURE — 99213 PR OFFICE/OUTPT VISIT, EST, LEVL III, 20-29 MIN: ICD-10-PCS | Mod: S$PBB,,, | Performed by: PODIATRIST

## 2021-11-17 PROCEDURE — 99999 PR PBB SHADOW E&M-EST. PATIENT-LVL III: ICD-10-PCS | Mod: PBBFAC,,, | Performed by: PODIATRIST

## 2021-11-17 PROCEDURE — 99213 OFFICE O/P EST LOW 20 MIN: CPT | Mod: PBBFAC,PN | Performed by: PODIATRIST

## 2022-02-01 ENCOUNTER — TELEPHONE (OUTPATIENT)
Dept: PODIATRY | Facility: CLINIC | Age: 52
End: 2022-02-01
Payer: MEDICAID

## 2022-02-01 NOTE — TELEPHONE ENCOUNTER
Patient asked if she could be put to sleep for steroid injections in clinic.. advised this procedure is in clinic and no clinic procedure require sedation and sedation can not be given in clinic.. verbalized understanding

## 2022-02-01 NOTE — TELEPHONE ENCOUNTER
----- Message from Ochoa White sent at 2/1/2022  4:44 PM CST -----  Regarding: call back  Contact: pt  Pt requesting a call back in regards to upcoming appt, pt have some questions and concerns about being put to sleep to take shot in her foot.        Pt @ [phone]

## 2022-02-08 ENCOUNTER — OFFICE VISIT (OUTPATIENT)
Dept: PODIATRY | Facility: CLINIC | Age: 52
End: 2022-02-08
Payer: MEDICAID

## 2022-02-08 VITALS
BODY MASS INDEX: 37.91 KG/M2 | HEART RATE: 86 BPM | WEIGHT: 214 LBS | SYSTOLIC BLOOD PRESSURE: 126 MMHG | DIASTOLIC BLOOD PRESSURE: 84 MMHG

## 2022-02-08 DIAGNOSIS — M72.2 PLANTAR FASCIITIS: ICD-10-CM

## 2022-02-08 DIAGNOSIS — M79.673 HEEL PAIN, UNSPECIFIED LATERALITY: Primary | ICD-10-CM

## 2022-02-08 PROCEDURE — 3079F DIAST BP 80-89 MM HG: CPT | Mod: CPTII,,, | Performed by: PODIATRIST

## 2022-02-08 PROCEDURE — 99213 OFFICE O/P EST LOW 20 MIN: CPT | Mod: PBBFAC,PN | Performed by: PODIATRIST

## 2022-02-08 PROCEDURE — 99999 PR PBB SHADOW E&M-EST. PATIENT-LVL III: ICD-10-PCS | Mod: PBBFAC,,, | Performed by: PODIATRIST

## 2022-02-08 PROCEDURE — 99213 OFFICE O/P EST LOW 20 MIN: CPT | Mod: S$PBB,,, | Performed by: PODIATRIST

## 2022-02-08 PROCEDURE — 1160F RVW MEDS BY RX/DR IN RCRD: CPT | Mod: CPTII,,, | Performed by: PODIATRIST

## 2022-02-08 PROCEDURE — 3074F SYST BP LT 130 MM HG: CPT | Mod: CPTII,,, | Performed by: PODIATRIST

## 2022-02-08 PROCEDURE — 3079F PR MOST RECENT DIASTOLIC BLOOD PRESSURE 80-89 MM HG: ICD-10-PCS | Mod: CPTII,,, | Performed by: PODIATRIST

## 2022-02-08 PROCEDURE — 99213 PR OFFICE/OUTPT VISIT, EST, LEVL III, 20-29 MIN: ICD-10-PCS | Mod: S$PBB,,, | Performed by: PODIATRIST

## 2022-02-08 PROCEDURE — 3074F PR MOST RECENT SYSTOLIC BLOOD PRESSURE < 130 MM HG: ICD-10-PCS | Mod: CPTII,,, | Performed by: PODIATRIST

## 2022-02-08 PROCEDURE — 1159F PR MEDICATION LIST DOCUMENTED IN MEDICAL RECORD: ICD-10-PCS | Mod: CPTII,,, | Performed by: PODIATRIST

## 2022-02-08 PROCEDURE — 1160F PR REVIEW ALL MEDS BY PRESCRIBER/CLIN PHARMACIST DOCUMENTED: ICD-10-PCS | Mod: CPTII,,, | Performed by: PODIATRIST

## 2022-02-08 PROCEDURE — 3008F PR BODY MASS INDEX (BMI) DOCUMENTED: ICD-10-PCS | Mod: CPTII,,, | Performed by: PODIATRIST

## 2022-02-08 PROCEDURE — 1159F MED LIST DOCD IN RCRD: CPT | Mod: CPTII,,, | Performed by: PODIATRIST

## 2022-02-08 PROCEDURE — 99999 PR PBB SHADOW E&M-EST. PATIENT-LVL III: CPT | Mod: PBBFAC,,, | Performed by: PODIATRIST

## 2022-02-08 PROCEDURE — 3008F BODY MASS INDEX DOCD: CPT | Mod: CPTII,,, | Performed by: PODIATRIST

## 2022-02-08 RX ORDER — NABUMETONE 500 MG/1
750 TABLET, FILM COATED ORAL DAILY
Qty: 60 TABLET | Refills: 1 | Status: SHIPPED | OUTPATIENT
Start: 2022-02-08

## 2022-02-08 RX ORDER — DICLOFENAC SODIUM 10 MG/G
2 GEL TOPICAL 4 TIMES DAILY
Qty: 350 G | Refills: 2 | Status: SHIPPED | OUTPATIENT
Start: 2022-02-08

## 2022-02-08 NOTE — PROGRESS NOTES
Subjective:      Patient ID: Nadir Abernathy is a 51 y.o. female.    Chief Complaint: Follow-up (L foot)    Nadir is a 51 y.o. female who presents for follow-up of heel pain L foot, especially with the first step in the morning & as the work day progresses. The pain is described as Aching, Throbbing and Deep. The onset of the pain was gradual and had worsened & has been a recurrent problem. Last visit was 3 months ago & was doing much better w/ padding & NSAID. Now states pain level is 8/10 daily. Pads helped but couldn't get them in new safety shoes. Otherwise, uses tennis shoes or slides or boots out. Wants to know if Xrays would help with spur diagnosis - again explained that symptoms are a result of the inflammation & spur is a result not cause, so treatment does not change w/ Xray finding of same.     PCP: MD FABRIZIO Su 10/14/21    Past Medical History:   Diagnosis Date    Fibroids 1995    Hypertension      Patient Active Problem List   Diagnosis    Chronic allergic rhinitis    Hypertension, essential      Objective:      Physical Exam  Vitals reviewed.   Constitutional:       Appearance: She is well-developed. She is obese.   Cardiovascular:      Pulses:           Dorsalis pedis pulses are 2+ on the left side.   Abdominal:      Palpations: Mass: aren gel.   Musculoskeletal:         General: Tenderness present. No swelling.      Right lower leg: Edema (mild varicosities BLE) present.      Left lower leg: Edema present.      Left foot: Normal range of motion.        Feet:    Feet:      Left foot:      Skin integrity: Skin integrity normal.      Comments: No pain along arch; pain anterior medial fascial insertion into heel L without radiation & central heel. No pain along medial heel nor PT tendon L.  Skin:     General: Skin is warm and dry.      Capillary Refill: Capillary refill takes less than 2 seconds.      Findings: No bruising or erythema.   Neurological:      Mental Status: She is  alert and oriented to person, place, and time.      Sensory: No sensory deficit.      Motor: No weakness.      Gait: Gait normal.   Psychiatric:         Mood and Affect: Mood and affect normal.         Behavior: Behavior normal. Behavior is cooperative.         Assessment:      Encounter Diagnoses   Name Primary?    Plantar fasciitis     Heel pain, unspecified laterality Yes       Problem List Items Addressed This Visit    None     Visit Diagnoses     Heel pain, unspecified laterality    -  Primary    Plantar fasciitis        Relevant Medications    nabumetone (RELAFEN) 500 MG tablet        Plan:       Nadir was seen today for follow-up.    Diagnoses and all orders for this visit:    Heel pain, unspecified laterality    Plantar fasciitis  -     nabumetone (RELAFEN) 500 MG tablet; Take 1.5 tablets (750 mg total) by mouth once daily. Start AFTER completion of Medrol dose min.    Other orders  -     diclofenac sodium (VOLTAREN) 1 % Gel; Apply 2 g topically 4 (four) times daily.    I counseled the patient on her conditions, their implications and medical management.    - Shoe inspection. Patient instructed on proper supportive shoe gear, never walking without protective shoe gear.    PPT arch cookies added again to non skids for work.    To bring in other shoes for modification.     F/u 3-4 wks., sooner prn

## 2022-02-23 DIAGNOSIS — Z12.31 OTHER SCREENING MAMMOGRAM: ICD-10-CM

## 2022-03-08 ENCOUNTER — OFFICE VISIT (OUTPATIENT)
Dept: PODIATRY | Facility: CLINIC | Age: 52
End: 2022-03-08
Payer: MEDICAID

## 2022-03-08 VITALS
SYSTOLIC BLOOD PRESSURE: 125 MMHG | WEIGHT: 212 LBS | DIASTOLIC BLOOD PRESSURE: 81 MMHG | HEART RATE: 70 BPM | BODY MASS INDEX: 37.55 KG/M2

## 2022-03-08 DIAGNOSIS — M79.672 PAIN OF LEFT HEEL: Primary | ICD-10-CM

## 2022-03-08 DIAGNOSIS — M72.2 PLANTAR FASCIITIS: ICD-10-CM

## 2022-03-08 PROCEDURE — 3079F DIAST BP 80-89 MM HG: CPT | Mod: CPTII,,, | Performed by: PODIATRIST

## 2022-03-08 PROCEDURE — 99213 OFFICE O/P EST LOW 20 MIN: CPT | Mod: PBBFAC,PN | Performed by: PODIATRIST

## 2022-03-08 PROCEDURE — 99999 PR PBB SHADOW E&M-EST. PATIENT-LVL III: CPT | Mod: PBBFAC,,, | Performed by: PODIATRIST

## 2022-03-08 PROCEDURE — 3074F SYST BP LT 130 MM HG: CPT | Mod: CPTII,,, | Performed by: PODIATRIST

## 2022-03-08 PROCEDURE — 3079F PR MOST RECENT DIASTOLIC BLOOD PRESSURE 80-89 MM HG: ICD-10-PCS | Mod: CPTII,,, | Performed by: PODIATRIST

## 2022-03-08 PROCEDURE — 3008F PR BODY MASS INDEX (BMI) DOCUMENTED: ICD-10-PCS | Mod: CPTII,,, | Performed by: PODIATRIST

## 2022-03-08 PROCEDURE — 99999 PR PBB SHADOW E&M-EST. PATIENT-LVL III: ICD-10-PCS | Mod: PBBFAC,,, | Performed by: PODIATRIST

## 2022-03-08 PROCEDURE — 3008F BODY MASS INDEX DOCD: CPT | Mod: CPTII,,, | Performed by: PODIATRIST

## 2022-03-08 PROCEDURE — 1159F MED LIST DOCD IN RCRD: CPT | Mod: CPTII,,, | Performed by: PODIATRIST

## 2022-03-08 PROCEDURE — 99214 OFFICE O/P EST MOD 30 MIN: CPT | Mod: S$PBB,,, | Performed by: PODIATRIST

## 2022-03-08 PROCEDURE — 99214 PR OFFICE/OUTPT VISIT, EST, LEVL IV, 30-39 MIN: ICD-10-PCS | Mod: S$PBB,,, | Performed by: PODIATRIST

## 2022-03-08 PROCEDURE — 3074F PR MOST RECENT SYSTOLIC BLOOD PRESSURE < 130 MM HG: ICD-10-PCS | Mod: CPTII,,, | Performed by: PODIATRIST

## 2022-03-08 PROCEDURE — 1159F PR MEDICATION LIST DOCUMENTED IN MEDICAL RECORD: ICD-10-PCS | Mod: CPTII,,, | Performed by: PODIATRIST

## 2022-03-08 NOTE — PROGRESS NOTES
Subjective:      Patient ID: Nadir Abernathy is a 51 y.o. female.    Chief Complaint: Foot Pain (L heel)    Nadir is a 51 y.o. female who presents for follow-up of heel pain L foot, especially with 1st step in the am & as the work day progresses. The pain is described as Aching, Throbbing and Deep. The onset of the pain was gradual and had been a recurrent problem last month; prior was 4 months ago & was doing much better w/ padding & NSAID. Pain level is 8/10 daily. Pads helped but couldn't get them in new safety shoes - states they slid out. Brings in several shoes for padding or evaluation.    PCP: MD FABRIZIO Su 10/14/21    Past Medical History:   Diagnosis Date    Fibroids 1995    Hypertension      Patient Active Problem List   Diagnosis    Chronic allergic rhinitis    Hypertension, essential      Objective:      Physical Exam  Vitals reviewed.   Constitutional:       Appearance: She is well-developed. She is obese.   Cardiovascular:      Pulses:           Dorsalis pedis pulses are 2+ on the left side.   Musculoskeletal:         General: Tenderness present. No swelling.      Right lower leg: Edema (mild varicosities BLE) present.      Left lower leg: Edema present.      Left foot: Normal range of motion.        Feet:    Feet:      Left foot:      Skin integrity: Skin integrity normal.      Comments: Pain anterior medial fascial insertion into heel L without radiation, & central heel. No pain along PT tendon L.    2 pairs of sandals: 1 has adequate cork MLA  1 flat canvas shoes  Old non-skin work shoe - insert good along MLA  New work shoe w/o insert or MLA support  Stretch tennis shoes w/ removable insole but w/o arch  Skin:     General: Skin is warm and dry.      Capillary Refill: Capillary refill takes less than 2 seconds.      Findings: No bruising or erythema.   Neurological:      Mental Status: She is alert and oriented to person, place, and time.      Sensory: No sensory deficit.       Motor: No weakness.      Gait: Gait normal.   Psychiatric:         Mood and Affect: Mood and affect normal.         Behavior: Behavior normal. Behavior is cooperative.         Assessment:      Encounter Diagnoses   Name Primary?    Pain of left heel Yes    Plantar fasciitis        Problem List Items Addressed This Visit    None     Visit Diagnoses     Pain of left heel    -  Primary    Plantar fasciitis            Plan:       Nadir was seen today for foot pain.    Diagnoses and all orders for this visit:    Pain of left heel    Plantar fasciitis    I counseled the patient on her conditions, their implications and medical management.    - Shoe inspection. Patient instructed on proper supportive shoe gear, never walking without protective shoe gear.    4 pairs of PPT arch cookies added to shoes as listed above comments.    Declined heel injection.     F/u 3-4 wks., sooner prn

## 2022-03-21 ENCOUNTER — PATIENT MESSAGE (OUTPATIENT)
Dept: ADMINISTRATIVE | Facility: HOSPITAL | Age: 52
End: 2022-03-21
Payer: MEDICAID

## 2022-05-17 DIAGNOSIS — I10 HYPERTENSION, ESSENTIAL: ICD-10-CM

## 2022-05-17 NOTE — TELEPHONE ENCOUNTER
----- Message from Karmen Jarvis sent at 5/17/2022  9:35 AM CDT -----  Regarding: Refill request  Type:  RX Refill Request    Who Called: KATELYNN LUCERO [[id number]]    Refill or New Rx: Refill     RX Name and Strength: hydroCHLOROthiazide (MICROZIDE) 12.5 mg capsule    How is the patient currently taking it? (ex. 1XDay) 1xday     Is this a 30 day or 90 day RX: 30 days     Preferred Pharmacy with phone number: [id number] - [place], [place] - [address]    Local or Mail Order: local     Ordering Provider: Jaya Reis Call Back Number: [phone]    Additional Information:  pt would like to schedule her annual sooner than next available 9/28 if possible. She is due after 6/1

## 2022-05-17 NOTE — TELEPHONE ENCOUNTER
Care Due:                  Date            Visit Type   Department     Provider  --------------------------------------------------------------------------------                                EP -                              [facility] INTERNAL  Jag Mendoza  Last Visit: 06-      CARE ([facility])   [facility]                              EP -                              [facility]  Next Visit: 06-      [facility] ([facility]                                                            Last  Test          Frequency    Reason                     Performed    Due Date  --------------------------------------------------------------------------------    CMP.........  12 months..  hydroCHLOROthiazide......  06- 05-    [facility] Embedded Care Gaps. Reference number: [id number]. 5/17/2022   11:31:27 AM CDT

## 2022-05-18 RX ORDER — HYDROCHLOROTHIAZIDE 12.5 MG/1
12.5 CAPSULE ORAL DAILY PRN
Qty: 90 CAPSULE | Refills: 0 | Status: SHIPPED | OUTPATIENT
Start: 2022-05-18 | End: 2022-06-08 | Stop reason: SDUPTHER

## 2022-05-30 ENCOUNTER — PATIENT MESSAGE (OUTPATIENT)
Dept: ADMINISTRATIVE | Facility: HOSPITAL | Age: 52
End: 2022-05-30
Payer: MEDICAID

## 2022-06-08 ENCOUNTER — OFFICE VISIT (OUTPATIENT)
Dept: INTERNAL MEDICINE | Facility: CLINIC | Age: 52
End: 2022-06-08
Attending: FAMILY MEDICINE
Payer: MEDICAID

## 2022-06-08 VITALS
HEIGHT: 63 IN | SYSTOLIC BLOOD PRESSURE: 120 MMHG | WEIGHT: 209.13 LBS | HEART RATE: 71 BPM | DIASTOLIC BLOOD PRESSURE: 68 MMHG | BODY MASS INDEX: 37.05 KG/M2 | OXYGEN SATURATION: 100 %

## 2022-06-08 DIAGNOSIS — M54.50 CHRONIC LEFT-SIDED LOW BACK PAIN WITHOUT SCIATICA: ICD-10-CM

## 2022-06-08 DIAGNOSIS — J30.9 CHRONIC ALLERGIC RHINITIS: Primary | ICD-10-CM

## 2022-06-08 DIAGNOSIS — G89.29 CHRONIC LEFT-SIDED LOW BACK PAIN WITHOUT SCIATICA: ICD-10-CM

## 2022-06-08 DIAGNOSIS — Z00.00 PREVENTATIVE HEALTH CARE: ICD-10-CM

## 2022-06-08 DIAGNOSIS — I10 HYPERTENSION, ESSENTIAL: ICD-10-CM

## 2022-06-08 PROCEDURE — 1160F PR REVIEW ALL MEDS BY PRESCRIBER/CLIN PHARMACIST DOCUMENTED: ICD-10-PCS | Mod: CPTII,,, | Performed by: FAMILY MEDICINE

## 2022-06-08 PROCEDURE — 1160F RVW MEDS BY RX/DR IN RCRD: CPT | Mod: CPTII,,, | Performed by: FAMILY MEDICINE

## 2022-06-08 PROCEDURE — 99396 PR PREVENTIVE VISIT,EST,40-64: ICD-10-PCS | Mod: S$PBB,,, | Performed by: FAMILY MEDICINE

## 2022-06-08 PROCEDURE — 3008F PR BODY MASS INDEX (BMI) DOCUMENTED: ICD-10-PCS | Mod: CPTII,,, | Performed by: FAMILY MEDICINE

## 2022-06-08 PROCEDURE — 99213 OFFICE O/P EST LOW 20 MIN: CPT | Mod: PBBFAC | Performed by: FAMILY MEDICINE

## 2022-06-08 PROCEDURE — 3008F BODY MASS INDEX DOCD: CPT | Mod: CPTII,,, | Performed by: FAMILY MEDICINE

## 2022-06-08 PROCEDURE — 3078F DIAST BP <80 MM HG: CPT | Mod: CPTII,,, | Performed by: FAMILY MEDICINE

## 2022-06-08 PROCEDURE — 99396 PREV VISIT EST AGE 40-64: CPT | Mod: S$PBB,,, | Performed by: FAMILY MEDICINE

## 2022-06-08 PROCEDURE — 1159F PR MEDICATION LIST DOCUMENTED IN MEDICAL RECORD: ICD-10-PCS | Mod: CPTII,,, | Performed by: FAMILY MEDICINE

## 2022-06-08 PROCEDURE — 1159F MED LIST DOCD IN RCRD: CPT | Mod: CPTII,,, | Performed by: FAMILY MEDICINE

## 2022-06-08 PROCEDURE — 99999 PR PBB SHADOW E&M-EST. PATIENT-LVL III: CPT | Mod: PBBFAC,,, | Performed by: FAMILY MEDICINE

## 2022-06-08 PROCEDURE — 3074F PR MOST RECENT SYSTOLIC BLOOD PRESSURE < 130 MM HG: ICD-10-PCS | Mod: CPTII,,, | Performed by: FAMILY MEDICINE

## 2022-06-08 PROCEDURE — 99999 PR PBB SHADOW E&M-EST. PATIENT-LVL III: ICD-10-PCS | Mod: PBBFAC,,, | Performed by: FAMILY MEDICINE

## 2022-06-08 PROCEDURE — 3074F SYST BP LT 130 MM HG: CPT | Mod: CPTII,,, | Performed by: FAMILY MEDICINE

## 2022-06-08 PROCEDURE — 3078F PR MOST RECENT DIASTOLIC BLOOD PRESSURE < 80 MM HG: ICD-10-PCS | Mod: CPTII,,, | Performed by: FAMILY MEDICINE

## 2022-06-08 RX ORDER — MONTELUKAST SODIUM 10 MG/1
10 TABLET ORAL NIGHTLY
Qty: 90 TABLET | Refills: 3 | Status: SHIPPED | OUTPATIENT
Start: 2022-06-08 | End: 2022-07-08

## 2022-06-08 RX ORDER — ONDANSETRON 4 MG/1
4 TABLET, ORALLY DISINTEGRATING ORAL EVERY 8 HOURS PRN
Qty: 90 TABLET | Refills: 3 | Status: SHIPPED | OUTPATIENT
Start: 2022-06-08

## 2022-06-08 RX ORDER — HYDROCHLOROTHIAZIDE 12.5 MG/1
12.5 CAPSULE ORAL DAILY PRN
Qty: 90 CAPSULE | Refills: 3 | Status: SHIPPED | OUTPATIENT
Start: 2022-06-08 | End: 2023-05-03 | Stop reason: SDUPTHER

## 2022-06-08 RX ORDER — OMEPRAZOLE 20 MG/1
20 CAPSULE, DELAYED RELEASE ORAL DAILY
Qty: 90 CAPSULE | Refills: 3 | Status: SHIPPED | OUTPATIENT
Start: 2022-06-08 | End: 2023-05-03 | Stop reason: SDUPTHER

## 2022-06-08 NOTE — PROGRESS NOTES
"CHIEF COMPLAINT: The patient presents annual and AR    HISTORY OF PRESENT ILLNESS: The patient presents for annual.  She is having allergic rhinitis as well.    She works as a .    BP is good.     REVIEW OF SYSTEMS:  GENERAL: No fatigability or weight loss.  SKIN: No rashes, itching or changes in color or texture of skin.  HEAD: No headaches or recent head trauma.  EYES: Visual acuity fine. No photophobia, ocular pain or diplopia.  EARS: Denies ear pain, discharge or vertigo.  NOSE: No loss of smell, no epistaxis or postnasal drip.  MOUTH & THROAT: No hoarseness or change in voice. No excessive gum bleeding.  NODES: Denies swollen glands.  CHEST: Denies SANTOS, cyanosis, wheezing, and sputum production.  CARDIOVASCULAR: Denies chest pain, PND, orthopnea or reduced exercise tolerance.  ABDOMEN: Appetite fine. No weight loss. Denies diarrhea, abdominal pain, hematemesis or blood in stool.  URINARY: No flank pain, dysuria or hematuria.  PERIPHERAL VASCULAR: No claudication or cyanosis.  MUSCULOSKELETAL: No joint stiffness or swelling. Denies back pain. Except as above  NEUROLOGIC: No history of seizures, paralysis, alteration of gait or coordination.    SOCIAL HISTORY: Unchanged since recent note    PHYSICAL EXAMINATION:   Blood pressure 120/68, pulse 71, height 5' 3" (1.6 m), weight 94.8 kg (209 lb 1.7 oz), last menstrual period 05/23/2022, SpO2 100 %.    APPEARANCE: Well nourished, well developed, in no acute distress.    HEAD: Normocephalic, atraumatic.  EYES: PERRL. EOMI.  Conjunctivae without injection and  Anicteric  NOSE: Mucosa pink. Airway clear.  MOUTH & THROAT: No tonsillar enlargement. No pharyngeal erythema or exudate. No stridor.  NECK: Supple.   NODES: No cervical, axillary or inguinal lymph node enlargement.  CHEST: Lungs demonstrate scattered mild wheezes bilaterally.  No retractions are noted.  No rales or rhonchi are present.  CARDIOVASCULAR: Normal S1, S2. No rubs, murmurs or gallops.  ABDOMEN: " Bowel sounds normal. Not distended. Soft. No tenderness or masses.  No ascites is noted.  MUSCULOSKELETAL:  There is no clubbing, cyanosis, or edema of the extremities x4.  There is full range of motion of the lumbar spine.  There is full range of motion of the extremities x4.  There is no deformity noted.    NEUROLOGIC:       Normal speech development.      Hearing normal.      Normal gait.      Motor and sensory exams grossly normal.  PSYCHIATRIC: Patient is alert and oriented x3.  Thought processes are all normal.  There is no homicidality.  There is no suicidality.  There is no evidence of psychosis.    LABORATORY/RADIOLOGY: Chart reviewed.    ASSESSMENT:   Annual  HTN  AR  Chronic left lower back pain    PLAN:  We will follow-up blood work which we expect to be normal.    GYN  Continue omeprazole  HCTZ  [facility]  Pain clinic referral    RTC 1 year

## 2022-06-10 ENCOUNTER — PATIENT MESSAGE (OUTPATIENT)
Dept: ORTHOPEDICS | Facility: CLINIC | Age: 52
End: 2022-06-10
Payer: MEDICAID

## 2022-08-24 ENCOUNTER — PATIENT MESSAGE (OUTPATIENT)
Dept: INTERNAL MEDICINE | Facility: CLINIC | Age: 52
End: 2022-08-24
Payer: MEDICAID

## 2022-08-31 DIAGNOSIS — Z12.11 COLON CANCER SCREENING: ICD-10-CM

## 2022-08-31 DIAGNOSIS — I10 HYPERTENSION, ESSENTIAL: ICD-10-CM

## 2022-10-10 ENCOUNTER — PATIENT MESSAGE (OUTPATIENT)
Dept: INTERNAL MEDICINE | Facility: CLINIC | Age: 52
End: 2022-10-10
Payer: MEDICAID

## 2022-11-22 ENCOUNTER — PATIENT MESSAGE (OUTPATIENT)
Dept: INTERNAL MEDICINE | Facility: CLINIC | Age: 52
End: 2022-11-22
Payer: MEDICAID

## 2022-11-28 ENCOUNTER — PATIENT MESSAGE (OUTPATIENT)
Dept: INTERNAL MEDICINE | Facility: CLINIC | Age: 52
End: 2022-11-28
Payer: MEDICAID

## 2023-01-03 ENCOUNTER — NURSE TRIAGE (OUTPATIENT)
Dept: ADMINISTRATIVE | Facility: CLINIC | Age: 53
End: 2023-01-03
Payer: MEDICAID

## 2023-01-03 ENCOUNTER — TELEPHONE (OUTPATIENT)
Dept: INTERNAL MEDICINE | Facility: CLINIC | Age: 53
End: 2023-01-03
Payer: MEDICAID

## 2023-01-03 NOTE — TELEPHONE ENCOUNTER
Called pt to discuss and verify that she is going to ER. Pt states she's working on a ride now. Pt states she's concerned about a sinus infection. I discussed going to ER and using an ambulance if she becomes SOB due to coughing up blood and concern for ongoing cold with chest pain and would want to R/O PE if she possibly has Covid. Pt states understanding and indicated that she will go to ER. Pt does not c/o SOB at this time. She sounds congested, but is able to speak easily in clear, full sentences.

## 2023-01-03 NOTE — TELEPHONE ENCOUNTER
----- Message from Maisha Izquierdo sent at 1/3/2023  1:48 PM CST -----  Regarding: self [phone]  Type:  Sooner Appointment Request    Patient is requesting a sooner appointment.  Patient declined first available appointment listed as well as another facility and provider .  Patient will not accept being placed on the waitlist and is requesting a message be sent to doctor.    Name of Caller:  self     When is the first available appointment? 2/8    Symptoms:  pt stated she is having chest pains/ cough has blood/ when blowing nose she has blood.     Would the patient rather a call back or a response via [facility]?  Call back    Best Call Back Number:  [phone]    [facility] call to on call

## 2023-01-03 NOTE — TELEPHONE ENCOUNTER
"Also rec'd call note from call center that states:    'Maisha GREGORY Staff  Caller: Unspecified (Today,  1:48 PM)  Type:  Sooner Appointment Request     Patient is requesting a sooner appointment.  Patient declined first available appointment listed as well as another facility and provider .  Patient will not accept being placed on the waitlist and is requesting a message be sent to doctor.     Name of Caller:  self     When is the first available appointment? 2/8     Symptoms:  pt stated she is having chest pains/ cough has blood/ when blowing nose she has blood.     Would the patient rather a call back or a response via [facility]?  Call back     Best Call Back Number:  [phone]     Tran call to on call "  "

## 2023-01-03 NOTE — TELEPHONE ENCOUNTER
Cough, chest pain. When she blow nose or spit up she has blood associated with the mucus. Chest pain comes and goes with cough. After she coughs she has to catch her breath. Pt then stated chest pain present when not coughing. Care advice recommend pt go to Er. Pt verbalized understanding.   Reason for Disposition   Chest pain present when not coughing    Additional Information   Negative: Bluish (or gray) lips or face   Negative: SEVERE difficulty breathing (e.g., struggling for each breath, speaks in single words)   Negative: Rapid onset of cough and has hives   Negative: Coughing started suddenly after medicine, an allergic food or bee sting   Negative: Difficulty breathing after exposure to flames, smoke, or fumes   Negative: Sounds like a life-threatening emergency to the triager   Negative: MODERATE difficulty breathing (e.g., speaks in phrases, SOB even at rest, pulse 100-120) and still present when not coughing    Protocols used: Cough-A-OH

## 2023-01-18 ENCOUNTER — PATIENT MESSAGE (OUTPATIENT)
Dept: ADMINISTRATIVE | Facility: HOSPITAL | Age: 53
End: 2023-01-18
Payer: MEDICAID

## 2023-02-07 ENCOUNTER — PATIENT OUTREACH (OUTPATIENT)
Dept: ADMINISTRATIVE | Facility: HOSPITAL | Age: 53
End: 2023-02-07
Payer: MEDICAID

## 2023-02-07 DIAGNOSIS — Z11.59 NEED FOR HEPATITIS C SCREENING TEST: ICD-10-CM

## 2023-02-07 DIAGNOSIS — Z12.31 ENCOUNTER FOR SCREENING MAMMOGRAM FOR BREAST CANCER: Primary | ICD-10-CM

## 2023-03-22 ENCOUNTER — PATIENT MESSAGE (OUTPATIENT)
Dept: ADMINISTRATIVE | Facility: HOSPITAL | Age: 53
End: 2023-03-22
Payer: MEDICAID

## 2023-03-22 ENCOUNTER — HOSPITAL ENCOUNTER (OUTPATIENT)
Dept: RADIOLOGY | Facility: OTHER | Age: 53
Discharge: HOME | End: 2023-03-22
Attending: FAMILY MEDICINE
Payer: MEDICAID

## 2023-03-22 DIAGNOSIS — Z12.31 ENCOUNTER FOR SCREENING MAMMOGRAM FOR BREAST CANCER: ICD-10-CM

## 2023-03-22 PROCEDURE — 77067 MAMMO DIGITAL SCREENING BILAT WITH TOMO: ICD-10-PCS | Mod: 26,,, | Performed by: INTERNAL MEDICINE

## 2023-03-22 PROCEDURE — 77067 SCR MAMMO BI INCL CAD: CPT | Mod: 26,,, | Performed by: INTERNAL MEDICINE

## 2023-03-22 PROCEDURE — 77067 SCR MAMMO BI INCL CAD: CPT | Mod: TC

## 2023-03-22 PROCEDURE — 77063 BREAST TOMOSYNTHESIS BI: CPT | Mod: 26,,, | Performed by: INTERNAL MEDICINE

## 2023-03-22 PROCEDURE — 77063 MAMMO DIGITAL SCREENING BILAT WITH TOMO: ICD-10-PCS | Mod: 26,,, | Performed by: INTERNAL MEDICINE

## 2023-05-03 ENCOUNTER — OFFICE VISIT (OUTPATIENT)
Dept: INTERNAL MEDICINE | Facility: CLINIC | Age: 53
End: 2023-05-03
Attending: FAMILY MEDICINE
Payer: MEDICAID

## 2023-05-03 VITALS
HEART RATE: 87 BPM | BODY MASS INDEX: 42.61 KG/M2 | DIASTOLIC BLOOD PRESSURE: 74 MMHG | OXYGEN SATURATION: 100 % | SYSTOLIC BLOOD PRESSURE: 120 MMHG | HEIGHT: 63 IN | WEIGHT: 240.5 LBS

## 2023-05-03 DIAGNOSIS — J30.9 CHRONIC ALLERGIC RHINITIS: ICD-10-CM

## 2023-05-03 DIAGNOSIS — I10 HYPERTENSION, ESSENTIAL: ICD-10-CM

## 2023-05-03 DIAGNOSIS — Z00.00 PREVENTATIVE HEALTH CARE: Primary | ICD-10-CM

## 2023-05-03 PROCEDURE — 3074F PR MOST RECENT SYSTOLIC BLOOD PRESSURE < 130 MM HG: ICD-10-PCS | Mod: CPTII,,, | Performed by: FAMILY MEDICINE

## 2023-05-03 PROCEDURE — 3044F PR MOST RECENT HEMOGLOBIN A1C LEVEL <7.0%: ICD-10-PCS | Mod: CPTII,,, | Performed by: FAMILY MEDICINE

## 2023-05-03 PROCEDURE — 1159F PR MEDICATION LIST DOCUMENTED IN MEDICAL RECORD: ICD-10-PCS | Mod: CPTII,,, | Performed by: FAMILY MEDICINE

## 2023-05-03 PROCEDURE — 1160F PR REVIEW ALL MEDS BY PRESCRIBER/CLIN PHARMACIST DOCUMENTED: ICD-10-PCS | Mod: CPTII,,, | Performed by: FAMILY MEDICINE

## 2023-05-03 PROCEDURE — 3078F DIAST BP <80 MM HG: CPT | Mod: CPTII,,, | Performed by: FAMILY MEDICINE

## 2023-05-03 PROCEDURE — 99396 PR PREVENTIVE VISIT,EST,40-64: ICD-10-PCS | Mod: S$PBB,,, | Performed by: FAMILY MEDICINE

## 2023-05-03 PROCEDURE — 1159F MED LIST DOCD IN RCRD: CPT | Mod: CPTII,,, | Performed by: FAMILY MEDICINE

## 2023-05-03 PROCEDURE — 3074F SYST BP LT 130 MM HG: CPT | Mod: CPTII,,, | Performed by: FAMILY MEDICINE

## 2023-05-03 PROCEDURE — 99396 PREV VISIT EST AGE 40-64: CPT | Mod: S$PBB,,, | Performed by: FAMILY MEDICINE

## 2023-05-03 PROCEDURE — 1160F RVW MEDS BY RX/DR IN RCRD: CPT | Mod: CPTII,,, | Performed by: FAMILY MEDICINE

## 2023-05-03 PROCEDURE — 3044F HG A1C LEVEL LT 7.0%: CPT | Mod: CPTII,,, | Performed by: FAMILY MEDICINE

## 2023-05-03 PROCEDURE — 99999 PR PBB SHADOW E&M-EST. PATIENT-LVL III: ICD-10-PCS | Mod: PBBFAC,,, | Performed by: FAMILY MEDICINE

## 2023-05-03 PROCEDURE — 3008F PR BODY MASS INDEX (BMI) DOCUMENTED: ICD-10-PCS | Mod: CPTII,,, | Performed by: FAMILY MEDICINE

## 2023-05-03 PROCEDURE — 99213 OFFICE O/P EST LOW 20 MIN: CPT | Mod: PBBFAC | Performed by: FAMILY MEDICINE

## 2023-05-03 PROCEDURE — 3008F BODY MASS INDEX DOCD: CPT | Mod: CPTII,,, | Performed by: FAMILY MEDICINE

## 2023-05-03 PROCEDURE — 3078F PR MOST RECENT DIASTOLIC BLOOD PRESSURE < 80 MM HG: ICD-10-PCS | Mod: CPTII,,, | Performed by: FAMILY MEDICINE

## 2023-05-03 PROCEDURE — 99999 PR PBB SHADOW E&M-EST. PATIENT-LVL III: CPT | Mod: PBBFAC,,, | Performed by: FAMILY MEDICINE

## 2023-05-03 RX ORDER — MONTELUKAST SODIUM 10 MG/1
10 TABLET ORAL NIGHTLY
Qty: 30 TABLET | Refills: 11 | Status: SHIPPED | OUTPATIENT
Start: 2023-05-03 | End: 2023-06-02

## 2023-05-03 RX ORDER — HYDROCHLOROTHIAZIDE 12.5 MG/1
12.5 CAPSULE ORAL DAILY PRN
Qty: 90 CAPSULE | Refills: 3 | Status: SHIPPED | OUTPATIENT
Start: 2023-05-03

## 2023-05-03 RX ORDER — OMEPRAZOLE 20 MG/1
20 CAPSULE, DELAYED RELEASE ORAL DAILY
Qty: 90 CAPSULE | Refills: 3 | Status: SHIPPED | OUTPATIENT
Start: 2023-05-03 | End: 2023-06-02

## 2023-05-03 NOTE — PROGRESS NOTES
[facility]  Progress Note    MRN: [id number]   Patient Name: Nadir Abernathy   Primary Care Physician: Jag Lake     Subjective:   Chief Complaint: Annual visit     History of Presenting Illness: Patient christian 52 y.o. female with history of hypertension who is here for an annual visit. She has no complaints today other than seasonal allergies. She gets congestion, runny nose, cough and difficulty breathing when she coughs. No blood in sputum, denies fever/chills, nausea/vomiting, headache or any other symptoms. Allergies are relieved with benadryl at night and claritin during the day. We recommended robitussin DM for cough and re-ordered her singulair.  Blood pressure is well controlled on hydrochlorothiazide.  Blood work from 3/22 was within normal limits, reviewed with patient today.    Past Medical History:   Diagnosis Date    Fibroids 1995    Hypertension      History reviewed. No pertinent surgical history.    Current Outpatient Medications on File Prior to Visit   Medication Sig Dispense Refill    diclofenac sodium (VOLTAREN) 1 % Gel Apply 2 g topically 4 (four) times daily. 350 g 2    hydroCHLOROthiazide (MICROZIDE) 12.5 mg capsule Take 1 capsule (12.5 mg total) by mouth daily as needed (leg swelling). 90 capsule 3    nabumetone (RELAFEN) 500 MG tablet Take 1.5 tablets (750 mg total) by mouth once daily. Start AFTER completion of Medrol dose min. 60 tablet 1    omeprazole (PRILOSEC) 20 MG capsule Take 1 capsule (20 mg total) by mouth once daily. 90 capsule 3    ondansetron (ZOFRAN-ODT) 4 MG TbDL Take 1 tablet (4 mg total) by mouth every 8 (eight) hours as needed (nausea). 90 tablet 3     No current facility-administered medications on file prior to visit.     Review of patient's allergies indicates:   Allergen Reactions    Shellfish containing products Anaphylaxis     Review of Systems   Constitutional:  Negative for chills and fever.   HENT:  Positive for congestion.    Respiratory:   "Positive for cough.    Cardiovascular:  Negative for chest pain.   Gastrointestinal:  Negative for abdominal pain and blood in stool.   Genitourinary:  Negative for hematuria.   Musculoskeletal: Negative.    Neurological: Negative.    Endo/Heme/Allergies: Negative.    Psychiatric/Behavioral: Negative.       Objective:   /74   Pulse 87   Ht 5' 3" (1.6 m)   Wt 109.1 kg (240 lb 8.4 oz)   LMP 04/12/2023   SpO2 100%   BMI 42.61 kg/m²     Physical Exam:  General: Well appearing, Not in distress  Head: Non-traumatic, normocephalic  Neck: Supple, no tenderness to palpation, no lymphadenopathy  CVS: Dual heart sounds, no added heart sounds, no murmur, regular rate and rhythm, distal pulses equal and strong  Pulm: Symmetric expansion, lungs clear to auscultation bilaterally, no wheeze, crackles, rales  GI: Abdomen soft, nontender, no masses  MSK: Moves all extremities without restriction, atraumatic  Skin: Dry, intact  Psych: Normal thought content and cognition  Neuro: AOx3; CNII-XII: Intact grossly, visual fields grossly intact, EOMi,   Normal strength BUE/BLE  Normal gait, on toes and heels    Labs 3/22/2023: HbA1c 5.5; Hep C Negative.  No images are attached to the encounter.    Assessment and Plan:   Patient is a 52 y.o. female with history of hypertension and seasonal allergies who is here for an annual visit.     Discussed labs from 3/22/23 -- normal as expected  Refilled omeprazole 90mg, hydrochlorothiazide 12.5mg and singulair 10mg      Scribed by  Michelle Mercedes  Med Student   "

## 2023-07-12 ENCOUNTER — PATIENT OUTREACH (OUTPATIENT)
Dept: ADMINISTRATIVE | Facility: HOSPITAL | Age: 53
End: 2023-07-12
Payer: MEDICAID

## 2023-07-12 ENCOUNTER — PATIENT MESSAGE (OUTPATIENT)
Dept: ADMINISTRATIVE | Facility: HOSPITAL | Age: 53
End: 2023-07-12
Payer: MEDICAID

## 2023-11-06 ENCOUNTER — HOSPITAL ENCOUNTER (EMERGENCY)
Facility: OTHER | Age: 53
Discharge: HOME | End: 2023-11-06
Attending: EMERGENCY MEDICINE
Payer: MEDICAID

## 2023-11-06 VITALS
RESPIRATION RATE: 18 BRPM | HEART RATE: 87 BPM | BODY MASS INDEX: 43.4 KG/M2 | OXYGEN SATURATION: 97 % | DIASTOLIC BLOOD PRESSURE: 80 MMHG | WEIGHT: 245 LBS | SYSTOLIC BLOOD PRESSURE: 147 MMHG | TEMPERATURE: 98 F

## 2023-11-06 DIAGNOSIS — R05.9 COUGH: ICD-10-CM

## 2023-11-06 DIAGNOSIS — J06.9 VIRAL URI WITH COUGH: Primary | ICD-10-CM

## 2023-11-06 LAB
B-HCG UR QL: NEGATIVE
CTP QC/QA: YES
GROUP A STREP, MOLECULAR: NEGATIVE
POC MOLECULAR INFLUENZA A AGN: NEGATIVE
POC MOLECULAR INFLUENZA B AGN: NEGATIVE
SARS-COV-2 RDRP RESP QL NAA+PROBE: NEGATIVE

## 2023-11-06 PROCEDURE — 87651 STREP A DNA AMP PROBE: CPT | Performed by: PHYSICIAN ASSISTANT

## 2023-11-06 PROCEDURE — 96372 THER/PROPH/DIAG INJ SC/IM: CPT

## 2023-11-06 PROCEDURE — 25000003 PHARM REV CODE 250

## 2023-11-06 PROCEDURE — 87635 SARS-COV-2 COVID-19 AMP PRB: CPT | Performed by: PHYSICIAN ASSISTANT

## 2023-11-06 PROCEDURE — 99284 EMERGENCY DEPT VISIT MOD MDM: CPT | Mod: 25

## 2023-11-06 PROCEDURE — 81025 URINE PREGNANCY TEST: CPT | Performed by: EMERGENCY MEDICINE

## 2023-11-06 PROCEDURE — 63600175 PHARM REV CODE 636 W HCPCS

## 2023-11-06 RX ORDER — NAPROXEN 500 MG/1
500 TABLET ORAL 2 TIMES DAILY PRN
Qty: 14 TABLET | Refills: 0 | Status: SHIPPED | OUTPATIENT
Start: 2023-11-06 | End: 2023-11-13

## 2023-11-06 RX ORDER — LIDOCAINE HYDROCHLORIDE 20 MG/ML
SOLUTION OROPHARYNGEAL EVERY 6 HOURS
Qty: 100 ML | Refills: 0 | Status: SHIPPED | OUTPATIENT
Start: 2023-11-06 | End: 2023-11-11

## 2023-11-06 RX ORDER — NAPROXEN 500 MG/1
500 TABLET ORAL
Status: COMPLETED | OUTPATIENT
Start: 2023-11-06 | End: 2023-11-06

## 2023-11-06 RX ORDER — GUAIFENESIN 600 MG/1
600 TABLET, EXTENDED RELEASE ORAL 2 TIMES DAILY
Qty: 10 TABLET | Refills: 0 | Status: SHIPPED | OUTPATIENT
Start: 2023-11-06 | End: 2023-11-11

## 2023-11-06 RX ORDER — ACETAMINOPHEN 500 MG
500 TABLET ORAL EVERY 6 HOURS PRN
Qty: 20 TABLET | Refills: 0 | Status: SHIPPED | OUTPATIENT
Start: 2023-11-06

## 2023-11-06 RX ORDER — DEXAMETHASONE SODIUM PHOSPHATE 4 MG/ML
4 INJECTION, SOLUTION INTRA-ARTICULAR; INTRALESIONAL; INTRAMUSCULAR; INTRAVENOUS; SOFT TISSUE
Status: COMPLETED | OUTPATIENT
Start: 2023-11-06 | End: 2023-11-06

## 2023-11-06 RX ORDER — GUAIFENESIN 600 MG/1
600 TABLET, EXTENDED RELEASE ORAL 2 TIMES DAILY
Status: DISCONTINUED | OUTPATIENT
Start: 2023-11-06 | End: 2023-11-06 | Stop reason: HOSPADM

## 2023-11-06 RX ORDER — FLUTICASONE PROPIONATE 50 MCG
1 SPRAY, SUSPENSION (ML) NASAL 2 TIMES DAILY PRN
Qty: 15 G | Refills: 0 | Status: SHIPPED | OUTPATIENT
Start: 2023-11-06 | End: 2023-11-13

## 2023-11-06 RX ADMIN — DEXAMETHASONE SODIUM PHOSPHATE 4 MG: 4 INJECTION INTRA-ARTICULAR; INTRALESIONAL; INTRAMUSCULAR; INTRAVENOUS; SOFT TISSUE at 11:11

## 2023-11-06 RX ADMIN — NAPROXEN 500 MG: 500 TABLET ORAL at 11:11

## 2023-11-06 RX ADMIN — GUAIFENESIN 600 MG: 600 TABLET, EXTENDED RELEASE ORAL at 11:11

## 2023-11-06 NOTE — FIRST PROVIDER EVALUATION
Emergency Department TeleTriage Encounter Note      CHIEF COMPLAINT    Chief Complaint   Patient presents with    Sore Throat    Cough     Pt reports sore throat, cough with brown mucus and nasal congestion for the past four days.        VITAL SIGNS   Initial Vitals [11/06/23 1012]   BP Pulse Resp Temp SpO2   (!) 140/60 88 18 98.8 °F (37.1 °C) 99 %      MAP       --            ALLERGIES    Review of patient's allergies indicates:   Allergen Reactions    Shellfish containing products Anaphylaxis       PROVIDER TRIAGE NOTE  Patient presents with complaint of sore throat, cough and congestion for 4 days.  Denies fevers.  Denies GI symptoms.      Phy:   Constitutional: well nourished, well developed, appearing stated age, NAD        Initial orders will be placed and care will be transferred to an alternate provider when patient is roomed for a full evaluation. Any additional orders and the final disposition will be determined by that provider.        ORDERS  Labs Reviewed - No data to display    ED Orders (720h ago, onward)      None              Virtual Visit Note: The provider triage portion of this emergency department evaluation and documentation was performed via [employer], a HIPAA-compliant telemedicine application, in concert with a tele-presenter in the room. A face to face patient evaluation with one of my colleagues will occur once the patient is placed in an emergency department room.      DISCLAIMER: This note was prepared with [employer] voice recognition transcription software. Garbled syntax, mangled pronouns, and other bizarre constructions may be attributed to that software system.

## 2023-11-06 NOTE — ED TRIAGE NOTES
Pt arrived with c/o sore throat, frequent productive cough, and painful swallowing x 3-4 days.  Pt denies any fever or known sick contacts.  Pt denies any n/v, states she is able to hold fluids down.  Pt was seen at  and rx'd cough and allergy medication.  Pt answering questions appropriately, speaking in complete sentences, respirations even and unlabored.  Aao x 4.

## 2023-11-06 NOTE — DISCHARGE INSTRUCTIONS
Please take Tylenol and naproxen as needed for any fever, headache, and pain.  You can take Mucinex as needed for your cough. You can use Flonase for any sinus congestion.  You can use viscous lidocaine for your sore throat; You can swallow it or swish and spit.

## 2023-11-07 NOTE — ED PROVIDER NOTES
Encounter Date: 11/6/2023       History     Chief Complaint   Patient presents with    Sore Throat    Cough     Pt reports sore throat, cough with brown mucus and nasal congestion for the past four days.      Nadir Abernathy is a 53 y.o. female presenting to the emergency department for evaluation of productive cough, nasal congestion, rhinorrhea, and sore throat for the past 4 days. She reports that it is painful to swallow. She presented to urgent care a few days ago, tested negative for COVID and influenza and was discharged home with Tessalon perles and an antihistamine, which have not provided relief of symptoms. She denies fever, chills, SOB, chest pain, palpitations, leg swelling, abdominal pain, nausea, vomiting, diarrhea, urinary symptoms, vaginal bleeding or vaginal discharge.       The history is provided by the patient.     Review of patient's allergies indicates:   Allergen Reactions    Shellfish containing products Anaphylaxis     Past Medical History:   Diagnosis Date    Fibroids 1995    Hypertension      Past Surgical History:   Procedure Laterality Date    UTERINE FIBROID SURGERY       Family History   Problem Relation Age of Onset    Diabetes Father     Breast cancer Neg Hx     Ovarian cancer Neg Hx      Social History     Tobacco Use    Smoking status: Never    Smokeless tobacco: Never   Substance Use Topics    Alcohol use: Yes     Alcohol/week: 0.0 standard drinks of alcohol    Drug use: No     Review of Systems   Constitutional:  Negative for chills and fever.   HENT:  Positive for congestion, rhinorrhea and sore throat.    Respiratory:  Positive for cough. Negative for shortness of breath.    Cardiovascular:  Negative for chest pain.   Gastrointestinal:  Negative for abdominal pain, diarrhea, nausea and vomiting.   Genitourinary:  Negative for dysuria, frequency and urgency.   Musculoskeletal:  Negative for back pain.   Skin:  Negative for rash.   Neurological:  Negative for dizziness and  headaches.   Psychiatric/Behavioral:  Negative for confusion.        Physical Exam     Initial Vitals [11/06/23 1012]   BP Pulse Resp Temp SpO2   (!) 140/60 88 18 98.8 °F (37.1 °C) 99 %      MAP       --         Physical Exam    Nursing note and vitals reviewed.  Constitutional: She appears well-developed and well-nourished. No distress.   HENT:   Head: Normocephalic and atraumatic.   Nose: Nose normal.   Mouth/Throat: Oropharynx is clear and moist.   Eyes: Conjunctivae and EOM are normal.   Neck: Neck supple.   Normal range of motion.  Cardiovascular:  Normal rate, regular rhythm, normal heart sounds and intact distal pulses.           Pulmonary/Chest: No respiratory distress. She has no wheezes. She has no rhonchi. She has no rales. She exhibits no tenderness.   Coarse breath sounds at bases.   Musculoskeletal:         General: Normal range of motion.      Cervical back: Normal range of motion and neck supple.     Neurological: She is alert and oriented to person, place, and time. She has normal strength.   Skin: Skin is warm and dry.   Psychiatric: She has a normal mood and affect. Her behavior is normal. Judgment and thought content normal.         ED Course   Procedures  Labs Reviewed   GROUP A STREP, MOLECULAR   SARS-COV-2 RDRP GENE   POCT INFLUENZA A/B MOLECULAR   POCT URINE PREGNANCY          Imaging Results              X-Ray Chest PA And Lateral (Final result)  Result time 11/06/23 11:22:11      Final result by Emilee Hale MD (11/06/23 11:22:11)                   Impression:      No acute cardiopulmonary process seen.      Electronically signed by: Emilee Hale  Date:    11/06/2023  Time:    11:22               Narrative:    EXAMINATION:  XR CHEST PA AND LATERAL    CLINICAL HISTORY:  Cough, unspecified    TECHNIQUE:  PA and lateral views of the chest were performed.    COMPARISON:  None    FINDINGS:  Lungs are well expanded.  No acute consolidation, pleural effusion, or pneumothorax  seen.    Cardiac silhouette is normal in size.                                    X-Rays:   Independently Interpreted Readings:   Chest X-Ray: No infiltrate, effusion, or pneumothorax. No cardiomegaly.      Medications   dexAMETHasone injection 4 mg (4 mg Intramuscular Given 11/6/23 1123)   naproxen tablet 500 mg (500 mg Oral Given 11/6/23 1121)     Medical Decision Making  Amount and/or Complexity of Data Reviewed  Labs: ordered.  Radiology: ordered.    Risk  OTC drugs.  Prescription drug management.                          Medical Decision Making:   Initial Assessment:   Urgent evaluation of 53-year-old female who presents with nasal congestion, rhinorrhea, productive cough, and worsening sore throat for the past 5 days.  She was seen at urgent care, tested negative for influenza and COVID, and prescribed Tessalon Perles and antihistamine, which have not provided relief her symptoms.  On exam, she was well-appearing and nontoxic.  Hypertensive in the ED but otherwise hemodynamically stable.  Afebrile in the ED. coarse breath sounds at the bases.  Plan for chest x-ray and rapid swabs.  Patient does not have a history of diabetes mellitus.  Will give dexamethasone and naproxen.  Clinical Tests:   Lab Tests: Ordered and Reviewed  Radiological Study: Ordered and Reviewed  ED Management:  Rapid COVID, influenza and strep tests are negative.  No acute process on chest x-ray.  I updated the patient on all results.  Explained that she has a viral syndrome.  Discharged home with prescriptions for Mucinex, Flonase, viscous lidocaine, naproxen, and Tylenol.  Recommended increasing her fluid intake and resting as much as possible.  Patient verbalized understanding and agreement with this plan of care. She was given specific return precautions. Advised to follow up with PCP as needed. All questions and concerns addressed. She is stable for discharge.     This note was created with MModal Fluency Direct Dictation. Please  excuse any spelling or grammatical errors.      Clinical Impression:   Final diagnoses:  [R05.9] Cough  [J06.9] Viral URI with cough (Primary)        ED Disposition Condition    Discharge Stable          ED Prescriptions       Medication Sig Dispense Start Date End Date Auth. Provider    naproxen (NAPROSYN) 500 MG tablet Take 1 tablet (500 mg total) by mouth 2 (two) times daily as needed (for pain/sore throat). 14 tablet 11/6/2023 11/13/2023 Shay Miramontes PA-C    acetaminophen (TYLENOL) 500 MG tablet Take 1 tablet (500 mg total) by mouth every 6 (six) hours as needed for Pain or Temperature greater than. 20 tablet 11/6/2023 -- Shay Miramontes PA-C    LIDOcaine HCl 2% (LIDOCAINE VISCOUS) 2 % Soln by Mucous Membrane route every 6 (six) hours. for 5 days 100 mL 11/6/2023 11/11/2023 Shay Miramontes PA-C    fluticasone propionate (FLONASE) 50 mcg/actuation nasal spray 1 spray (50 mcg total) by Each Nostril route 2 (two) times daily as needed for Rhinitis or Allergies. 15 g 11/6/2023 11/13/2023 Shay Miramontes PA-C    guaiFENesin (MUCINEX) 600 mg 12 hr tablet Take 1 tablet (600 mg total) by mouth 2 (two) times daily. for 5 days 10 tablet 11/6/2023 11/11/2023 Shay Miramontes PA-C          Follow-up Information    None          Shay Miramontes PA-C  11/07/23 0039

## 2024-02-06 DIAGNOSIS — Z12.11 COLON CANCER SCREENING: ICD-10-CM

## 2024-06-12 DIAGNOSIS — Z12.31 OTHER SCREENING MAMMOGRAM: ICD-10-CM

## 2024-07-05 RX ORDER — OMEPRAZOLE 20 MG/1
20 CAPSULE, DELAYED RELEASE ORAL DAILY
Qty: 90 CAPSULE | Refills: 0 | Status: SHIPPED | OUTPATIENT
Start: 2024-07-05

## 2024-07-05 NOTE — TELEPHONE ENCOUNTER
Care Due:                  Date            Visit Type   Department     Provider  --------------------------------------------------------------------------------                                EP -                              PRIMARY      [facility] INTERNAL  Jag Mendoza  Last Visit: 05-      CARE ([facility])   [facility]  Next Visit: None Scheduled  None         None Found                                                            Last  Test          Frequency    Reason                     Performed    Due Date  --------------------------------------------------------------------------------    Office Visit  15 months..  hydroCHLOROthiazide,       05- 07-                             omeprazole...............    CMP.........  12 months..  hydroCHLOROthiazide......  Not Found    Overdue    [employer] Embedded Care Due Messages. Reference number: [id number].   7/05/2024 10:27:15 AM CDT

## 2024-07-24 ENCOUNTER — LAB VISIT (OUTPATIENT)
Dept: LAB | Facility: OTHER | Age: 54
End: 2024-07-24
Attending: FAMILY MEDICINE
Payer: MEDICAID

## 2024-07-24 ENCOUNTER — OFFICE VISIT (OUTPATIENT)
Dept: INTERNAL MEDICINE | Facility: CLINIC | Age: 54
End: 2024-07-24
Attending: FAMILY MEDICINE
Payer: MEDICAID

## 2024-07-24 VITALS
HEART RATE: 70 BPM | WEIGHT: 248.44 LBS | BODY MASS INDEX: 44.02 KG/M2 | OXYGEN SATURATION: 100 % | SYSTOLIC BLOOD PRESSURE: 134 MMHG | DIASTOLIC BLOOD PRESSURE: 73 MMHG | HEIGHT: 63 IN

## 2024-07-24 DIAGNOSIS — Z12.31 ENCOUNTER FOR SCREENING MAMMOGRAM FOR MALIGNANT NEOPLASM OF BREAST: ICD-10-CM

## 2024-07-24 DIAGNOSIS — I10 HYPERTENSION, ESSENTIAL: ICD-10-CM

## 2024-07-24 DIAGNOSIS — Z00.00 PREVENTATIVE HEALTH CARE: Primary | ICD-10-CM

## 2024-07-24 DIAGNOSIS — Z00.00 PREVENTATIVE HEALTH CARE: ICD-10-CM

## 2024-07-24 LAB
ALBUMIN SERPL BCP-MCNC: 3.8 G/DL (ref 3.5–5.2)
ALP SERPL-CCNC: 86 U/L (ref 55–135)
ALT SERPL W/O P-5'-P-CCNC: 14 U/L (ref 10–44)
ANION GAP SERPL CALC-SCNC: 11 MMOL/L (ref 8–16)
AST SERPL-CCNC: 15 U/L (ref 10–40)
BILIRUB SERPL-MCNC: 0.3 MG/DL (ref 0.1–1)
BUN SERPL-MCNC: 9 MG/DL (ref 6–20)
CALCIUM SERPL-MCNC: 9.9 MG/DL (ref 8.7–10.5)
CHLORIDE SERPL-SCNC: 105 MMOL/L (ref 95–110)
CHOLEST SERPL-MCNC: 229 MG/DL (ref 120–199)
CHOLEST/HDLC SERPL: 3.1 {RATIO} (ref 2–5)
CO2 SERPL-SCNC: 26 MMOL/L (ref 23–29)
CREAT SERPL-MCNC: 0.7 MG/DL (ref 0.5–1.4)
EST. GFR  (NO RACE VARIABLE): >60 ML/MIN/1.73 M^2
ESTIMATED AVG GLUCOSE: 111 MG/DL (ref 68–131)
GLUCOSE SERPL-MCNC: 91 MG/DL (ref 70–110)
HBA1C MFR BLD: 5.5 % (ref 4–5.6)
HDLC SERPL-MCNC: 75 MG/DL (ref 40–75)
HDLC SERPL: 32.8 % (ref 20–50)
LDLC SERPL CALC-MCNC: 136 MG/DL (ref 63–159)
NONHDLC SERPL-MCNC: 154 MG/DL
POTASSIUM SERPL-SCNC: 4.8 MMOL/L (ref 3.5–5.1)
PROT SERPL-MCNC: 8 G/DL (ref 6–8.4)
SODIUM SERPL-SCNC: 142 MMOL/L (ref 136–145)
TRIGL SERPL-MCNC: 90 MG/DL (ref 30–150)
TSH SERPL DL<=0.005 MIU/L-ACNC: 1.12 UIU/ML (ref 0.4–4)

## 2024-07-24 PROCEDURE — 80061 LIPID PANEL: CPT | Performed by: FAMILY MEDICINE

## 2024-07-24 PROCEDURE — 1159F MED LIST DOCD IN RCRD: CPT | Mod: CPTII,,, | Performed by: FAMILY MEDICINE

## 2024-07-24 PROCEDURE — 99213 OFFICE O/P EST LOW 20 MIN: CPT | Mod: PBBFAC | Performed by: FAMILY MEDICINE

## 2024-07-24 PROCEDURE — 99396 PREV VISIT EST AGE 40-64: CPT | Mod: S$PBB,,, | Performed by: FAMILY MEDICINE

## 2024-07-24 PROCEDURE — 84443 ASSAY THYROID STIM HORMONE: CPT | Performed by: FAMILY MEDICINE

## 2024-07-24 PROCEDURE — 80053 COMPREHEN METABOLIC PANEL: CPT | Performed by: FAMILY MEDICINE

## 2024-07-24 PROCEDURE — 3008F BODY MASS INDEX DOCD: CPT | Mod: CPTII,,, | Performed by: FAMILY MEDICINE

## 2024-07-24 PROCEDURE — 3078F DIAST BP <80 MM HG: CPT | Mod: CPTII,,, | Performed by: FAMILY MEDICINE

## 2024-07-24 PROCEDURE — 3075F SYST BP GE 130 - 139MM HG: CPT | Mod: CPTII,,, | Performed by: FAMILY MEDICINE

## 2024-07-24 PROCEDURE — 99999 PR PBB SHADOW E&M-EST. PATIENT-LVL III: CPT | Mod: PBBFAC,,, | Performed by: FAMILY MEDICINE

## 2024-07-24 PROCEDURE — 1160F RVW MEDS BY RX/DR IN RCRD: CPT | Mod: CPTII,,, | Performed by: FAMILY MEDICINE

## 2024-07-24 PROCEDURE — 83036 HEMOGLOBIN GLYCOSYLATED A1C: CPT | Performed by: FAMILY MEDICINE

## 2024-07-24 PROCEDURE — 36415 COLL VENOUS BLD VENIPUNCTURE: CPT | Performed by: FAMILY MEDICINE

## 2024-07-24 RX ORDER — OMEPRAZOLE 20 MG/1
20 CAPSULE, DELAYED RELEASE ORAL DAILY
Qty: 90 CAPSULE | Refills: 0 | Status: SHIPPED | OUTPATIENT
Start: 2024-07-24

## 2024-07-24 RX ORDER — ONDANSETRON 4 MG/1
4 TABLET, ORALLY DISINTEGRATING ORAL EVERY 8 HOURS PRN
Qty: 90 TABLET | Refills: 3 | Status: SHIPPED | OUTPATIENT
Start: 2024-07-24

## 2024-07-24 RX ORDER — HYDROCHLOROTHIAZIDE 12.5 MG/1
12.5 CAPSULE ORAL DAILY PRN
Qty: 90 CAPSULE | Refills: 3 | Status: SHIPPED | OUTPATIENT
Start: 2024-07-24

## 2024-07-24 NOTE — PROGRESS NOTES
[facility]  Progress Note    MRN: [id number]   Patient Name: Nadir Abernathy   Primary Care Physician: Jag Lake     Subjective:   Chief Complaint: Annual visit     History of Presenting Illness: Patient christian 54 y.o. female with history of hypertension who is here for an annual visit. She has no complaints today other than seasonal allergies. She gets congestion, runny nose, cough and difficulty breathing when she coughs. No blood in sputum, denies fever/chills, nausea/vomiting, headache or any other symptoms. Allergies are relieved with benadryl at night and claritin during the day.   Blood pressure is well controlled on hydrochlorothiazide.  Blood work from 3/22 was within normal limits, reviewed with patient today.    Past Medical History:   Diagnosis Date    Fibroids 1995    Hypertension      Past Surgical History:   Procedure Laterality Date    UTERINE FIBROID SURGERY         Current Outpatient Medications on File Prior to Visit   Medication Sig Dispense Refill    acetaminophen (TYLENOL) 500 MG tablet Take 1 tablet (500 mg total) by mouth every 6 (six) hours as needed for Pain or Temperature greater than. 20 tablet 0    [DISCONTINUED] hydroCHLOROthiazide (MICROZIDE) 12.5 mg capsule Take 1 capsule (12.5 mg total) by mouth daily as needed (leg swelling). 90 capsule 3    [DISCONTINUED] omeprazole (PRILOSEC) 20 MG capsule Take 1 capsule (20 mg total) by mouth once daily. 90 capsule 0    [DISCONTINUED] ondansetron (ZOFRAN-ODT) 4 MG TbDL Take 1 tablet (4 mg total) by mouth every 8 (eight) hours as needed (nausea). 90 tablet 3    diclofenac sodium (VOLTAREN) 1 % Gel Apply 2 g topically 4 (four) times daily. (Patient not taking: Reported on 7/24/2024) 350 g 2    nabumetone (RELAFEN) 500 MG tablet Take 1.5 tablets (750 mg total) by mouth once daily. Start AFTER completion of Medrol dose min. (Patient not taking: Reported on 7/24/2024) 60 tablet 1     No current facility-administered medications on  "file prior to visit.     Review of patient's allergies indicates:   Allergen Reactions    Shellfish containing products Anaphylaxis     Review of Systems   Constitutional:  Negative for chills and fever.   HENT:  Positive for congestion.    Respiratory:  Positive for cough.    Cardiovascular:  Negative for chest pain.   Gastrointestinal:  Negative for abdominal pain and blood in stool.   Genitourinary:  Negative for hematuria.   Musculoskeletal: Negative.    Neurological: Negative.    Endo/Heme/Allergies: Negative.    Psychiatric/Behavioral: Negative.         Objective:   /73   Pulse 70   Ht 5' 3" (1.6 m)   Wt 112.7 kg (248 lb 7.3 oz)   SpO2 100%   BMI 44.01 kg/m²     Physical Exam:  General: Well appearing, Not in distress  Head: Non-traumatic, normocephalic  Neck: Supple, no tenderness to palpation, no lymphadenopathy  CVS: Dual heart sounds, no added heart sounds, no murmur, regular rate and rhythm, distal pulses equal and strong  Pulm: Symmetric expansion, lungs clear to auscultation bilaterally, no wheeze, crackles, rales  GI: Abdomen soft, nontender, no masses  MSK: Moves all extremities without restriction, atraumatic  Skin: Dry, intact  Psych: Normal thought content and cognition  Neuro: AOx3; CNII-XII: Intact grossly, visual fields grossly intact, EOMi,   Normal strength BUE/BLE  Normal gait, on toes and heels    Labs 3/22/2023: HbA1c 5.5; Hep C Negative.  No images are attached to the encounter.    Assessment and Plan:   Patient is a 54 y.o. female with history of hypertension and seasonal allergies who is here for an annual visit.     We will follow-up blood work which we expect to be normal.    Refilled omeprazole 90mg, hydrochlorothiazide 12.5mg and singulair 10mg  "

## 2024-07-25 ENCOUNTER — HOSPITAL ENCOUNTER (OUTPATIENT)
Dept: RADIOLOGY | Facility: OTHER | Age: 54
Discharge: HOME | End: 2024-07-25
Attending: FAMILY MEDICINE
Payer: MEDICAID

## 2024-07-25 DIAGNOSIS — Z12.31 ENCOUNTER FOR SCREENING MAMMOGRAM FOR MALIGNANT NEOPLASM OF BREAST: ICD-10-CM

## 2024-07-25 PROCEDURE — 77067 SCR MAMMO BI INCL CAD: CPT | Mod: TC

## 2024-10-26 ENCOUNTER — HOSPITAL ENCOUNTER (EMERGENCY)
Facility: OTHER | Age: 54
Discharge: HOME | End: 2024-10-26
Attending: EMERGENCY MEDICINE
Payer: MEDICAID

## 2024-10-26 VITALS
WEIGHT: 245 LBS | HEIGHT: 63 IN | TEMPERATURE: 98 F | RESPIRATION RATE: 18 BRPM | BODY MASS INDEX: 43.41 KG/M2 | DIASTOLIC BLOOD PRESSURE: 98 MMHG | OXYGEN SATURATION: 100 % | SYSTOLIC BLOOD PRESSURE: 184 MMHG | HEART RATE: 68 BPM

## 2024-10-26 DIAGNOSIS — K80.20 CALCULUS OF GALLBLADDER WITHOUT CHOLECYSTITIS WITHOUT OBSTRUCTION: Primary | ICD-10-CM

## 2024-10-26 LAB
ALBUMIN SERPL BCP-MCNC: 4.1 G/DL (ref 3.5–5.2)
ALP SERPL-CCNC: 79 U/L (ref 40–150)
ALT SERPL W/O P-5'-P-CCNC: 18 U/L (ref 10–44)
ANION GAP SERPL CALC-SCNC: 12 MMOL/L (ref 8–16)
AST SERPL-CCNC: 15 U/L (ref 10–40)
BASOPHILS # BLD AUTO: 0.03 K/UL (ref 0–0.2)
BASOPHILS NFR BLD: 0.4 % (ref 0–1.9)
BILIRUB SERPL-MCNC: 0.4 MG/DL (ref 0.1–1)
BILIRUB UR QL STRIP: NEGATIVE
BUN SERPL-MCNC: 8 MG/DL (ref 6–20)
CALCIUM SERPL-MCNC: 10.3 MG/DL (ref 8.7–10.5)
CHLORIDE SERPL-SCNC: 104 MMOL/L (ref 95–110)
CLARITY UR: CLEAR
CO2 SERPL-SCNC: 25 MMOL/L (ref 23–29)
COLOR UR: YELLOW
CREAT SERPL-MCNC: 0.8 MG/DL (ref 0.5–1.4)
DIFFERENTIAL METHOD BLD: ABNORMAL
EOSINOPHIL # BLD AUTO: 0 K/UL (ref 0–0.5)
EOSINOPHIL NFR BLD: 0.1 % (ref 0–8)
ERYTHROCYTE [DISTWIDTH] IN BLOOD BY AUTOMATED COUNT: 12.2 % (ref 11.5–14.5)
EST. GFR  (NO RACE VARIABLE): >60 ML/MIN/1.73 M^2
GLUCOSE SERPL-MCNC: 116 MG/DL (ref 70–110)
GLUCOSE UR QL STRIP: NEGATIVE
HCT VFR BLD AUTO: 45.2 % (ref 37–48.5)
HGB BLD-MCNC: 15.1 G/DL (ref 12–16)
HGB UR QL STRIP: NEGATIVE
IMM GRANULOCYTES # BLD AUTO: 0.02 K/UL (ref 0–0.04)
IMM GRANULOCYTES NFR BLD AUTO: 0.3 % (ref 0–0.5)
KETONES UR QL STRIP: ABNORMAL
LEUKOCYTE ESTERASE UR QL STRIP: NEGATIVE
LIPASE SERPL-CCNC: 16 U/L (ref 4–60)
LYMPHOCYTES # BLD AUTO: 1.3 K/UL (ref 1–4.8)
LYMPHOCYTES NFR BLD: 17.1 % (ref 18–48)
MAGNESIUM SERPL-MCNC: 2 MG/DL (ref 1.6–2.6)
MCH RBC QN AUTO: 29.5 PG (ref 27–31)
MCHC RBC AUTO-ENTMCNC: 33.4 G/DL (ref 32–36)
MCV RBC AUTO: 88 FL (ref 82–98)
MONOCYTES # BLD AUTO: 0.4 K/UL (ref 0.3–1)
MONOCYTES NFR BLD: 5.3 % (ref 4–15)
NEUTROPHILS # BLD AUTO: 6 K/UL (ref 1.8–7.7)
NEUTROPHILS NFR BLD: 76.8 % (ref 38–73)
NITRITE UR QL STRIP: NEGATIVE
NRBC BLD-RTO: 0 /100 WBC
PH UR STRIP: 7 [PH] (ref 5–8)
PLATELET # BLD AUTO: 364 K/UL (ref 150–450)
PMV BLD AUTO: 8.9 FL (ref 9.2–12.9)
POTASSIUM SERPL-SCNC: 3.9 MMOL/L (ref 3.5–5.1)
PROT SERPL-MCNC: 8.4 G/DL (ref 6–8.4)
PROT UR QL STRIP: ABNORMAL
RBC # BLD AUTO: 5.12 M/UL (ref 4–5.4)
SODIUM SERPL-SCNC: 141 MMOL/L (ref 136–145)
SP GR UR STRIP: 1.03 (ref 1–1.03)
URN SPEC COLLECT METH UR: ABNORMAL
UROBILINOGEN UR STRIP-ACNC: ABNORMAL EU/DL
WBC # BLD AUTO: 7.74 K/UL (ref 3.9–12.7)

## 2024-10-26 PROCEDURE — 96374 THER/PROPH/DIAG INJ IV PUSH: CPT

## 2024-10-26 PROCEDURE — 83690 ASSAY OF LIPASE: CPT | Performed by: EMERGENCY MEDICINE

## 2024-10-26 PROCEDURE — 96375 TX/PRO/DX INJ NEW DRUG ADDON: CPT

## 2024-10-26 PROCEDURE — 83735 ASSAY OF MAGNESIUM: CPT | Performed by: EMERGENCY MEDICINE

## 2024-10-26 PROCEDURE — 80053 COMPREHEN METABOLIC PANEL: CPT | Performed by: EMERGENCY MEDICINE

## 2024-10-26 PROCEDURE — 85025 COMPLETE CBC W/AUTO DIFF WBC: CPT | Performed by: EMERGENCY MEDICINE

## 2024-10-26 PROCEDURE — 99285 EMERGENCY DEPT VISIT HI MDM: CPT | Mod: 25

## 2024-10-26 PROCEDURE — 81003 URINALYSIS AUTO W/O SCOPE: CPT | Performed by: EMERGENCY MEDICINE

## 2024-10-26 PROCEDURE — 63600175 PHARM REV CODE 636 W HCPCS: Performed by: EMERGENCY MEDICINE

## 2024-10-26 RX ORDER — ONDANSETRON 4 MG/1
4 TABLET, ORALLY DISINTEGRATING ORAL EVERY 6 HOURS PRN
Qty: 12 TABLET | Refills: 0 | Status: SHIPPED | OUTPATIENT
Start: 2024-10-26

## 2024-10-26 RX ORDER — ONDANSETRON HYDROCHLORIDE 2 MG/ML
4 INJECTION, SOLUTION INTRAVENOUS
Status: COMPLETED | OUTPATIENT
Start: 2024-10-26 | End: 2024-10-26

## 2024-10-26 RX ORDER — KETOROLAC TROMETHAMINE 30 MG/ML
30 INJECTION, SOLUTION INTRAMUSCULAR; INTRAVENOUS
Status: COMPLETED | OUTPATIENT
Start: 2024-10-26 | End: 2024-10-26

## 2024-10-26 RX ORDER — HYDROCODONE BITARTRATE AND ACETAMINOPHEN 5; 325 MG/1; MG/1
1 TABLET ORAL EVERY 4 HOURS PRN
Qty: 12 TABLET | Refills: 0 | Status: SHIPPED | OUTPATIENT
Start: 2024-10-26

## 2024-10-26 RX ADMIN — KETOROLAC TROMETHAMINE 30 MG: 30 INJECTION, SOLUTION INTRAMUSCULAR; INTRAVENOUS at 11:10

## 2024-10-26 RX ADMIN — ONDANSETRON 4 MG: 2 INJECTION INTRAMUSCULAR; INTRAVENOUS at 11:10

## 2024-10-28 ENCOUNTER — NURSE TRIAGE (OUTPATIENT)
Dept: ADMINISTRATIVE | Facility: CLINIC | Age: 54
End: 2024-10-28
Payer: MEDICAID

## 2024-10-30 ENCOUNTER — OFFICE VISIT (OUTPATIENT)
Dept: SURGERY | Facility: CLINIC | Age: 54
End: 2024-10-30
Payer: MEDICAID

## 2024-10-30 VITALS
HEIGHT: 63 IN | HEART RATE: 101 BPM | BODY MASS INDEX: 43.41 KG/M2 | SYSTOLIC BLOOD PRESSURE: 131 MMHG | OXYGEN SATURATION: 100 % | DIASTOLIC BLOOD PRESSURE: 72 MMHG | WEIGHT: 245 LBS

## 2024-10-30 DIAGNOSIS — K80.10 CHRONIC CALCULOUS CHOLECYSTITIS: Primary | ICD-10-CM

## 2024-10-30 PROCEDURE — 3008F BODY MASS INDEX DOCD: CPT | Mod: CPTII,,, | Performed by: SURGERY

## 2024-10-30 PROCEDURE — 3078F DIAST BP <80 MM HG: CPT | Mod: CPTII,,, | Performed by: SURGERY

## 2024-10-30 PROCEDURE — 99213 OFFICE O/P EST LOW 20 MIN: CPT | Mod: PBBFAC | Performed by: SURGERY

## 2024-10-30 PROCEDURE — 99203 OFFICE O/P NEW LOW 30 MIN: CPT | Mod: S$PBB,,, | Performed by: SURGERY

## 2024-10-30 PROCEDURE — 1160F RVW MEDS BY RX/DR IN RCRD: CPT | Mod: CPTII,,, | Performed by: SURGERY

## 2024-10-30 PROCEDURE — 99999 PR PBB SHADOW E&M-EST. PATIENT-LVL III: CPT | Mod: PBBFAC,,, | Performed by: SURGERY

## 2024-10-30 PROCEDURE — 1159F MED LIST DOCD IN RCRD: CPT | Mod: CPTII,,, | Performed by: SURGERY

## 2024-10-30 PROCEDURE — 3075F SYST BP GE 130 - 139MM HG: CPT | Mod: CPTII,,, | Performed by: SURGERY

## 2024-10-30 PROCEDURE — 3044F HG A1C LEVEL LT 7.0%: CPT | Mod: CPTII,,, | Performed by: SURGERY

## 2024-12-17 ENCOUNTER — TELEPHONE (OUTPATIENT)
Dept: SURGERY | Facility: CLINIC | Age: 54
End: 2024-12-17
Payer: MEDICAID

## 2024-12-18 ENCOUNTER — ANESTHESIA EVENT (OUTPATIENT)
Dept: SURGERY | Facility: OTHER | Age: 54
End: 2024-12-18
Payer: MEDICAID

## 2024-12-18 ENCOUNTER — PATIENT MESSAGE (OUTPATIENT)
Dept: PREADMISSION TESTING | Facility: OTHER | Age: 54
End: 2024-12-18
Payer: MEDICAID

## 2024-12-18 NOTE — PRE-PROCEDURE INSTRUCTIONS
Pre admit phone call completed.    Instructions given to patient about NPO status as follows:     The evening before surgery do not eat anything after 9 p.m. ( this includes hard candy, chewing gum and mints).  You may only have GATORADE, POWERADE AND WATER from 9 p.m. until you leave your home. DO NOT  DRINK ANY LIQUIDS ON THE WAY TO THE HOSPITAL.      Patient was also instructed on the below information:    Park in the Parking lot behind the hospital or in the [facility] across the street from the parking lot.  Parking is complimentary.  If you will be discharged the same day as your procedure, please arrange for a responsible adult to drive you home or  to accompany you if traveling by taxi.  YOU WILL NOT BE PERMITTED TO DRIVE OR TO LEAVE THE HOSPITAL ALONE AFTER SURGERY.  It is strongly recommended that you arrange for someone to remain with you for the first 24 hrs following your surgery.    Patient verbalized understanding of above instructions.

## 2024-12-30 ENCOUNTER — HOSPITAL ENCOUNTER (INPATIENT)
Facility: OTHER | Age: 54
LOS: 4 days | Discharge: HOME | DRG: 415 | End: 2025-01-03
Attending: SURGERY | Admitting: SURGERY
Payer: MEDICAID

## 2024-12-30 ENCOUNTER — ANESTHESIA (OUTPATIENT)
Dept: SURGERY | Facility: OTHER | Age: 54
End: 2024-12-30
Payer: MEDICAID

## 2024-12-30 DIAGNOSIS — K80.20 GALLSTONE: ICD-10-CM

## 2024-12-30 DIAGNOSIS — K80.10 CHRONIC CALCULOUS CHOLECYSTITIS: ICD-10-CM

## 2024-12-30 DIAGNOSIS — K80.00 ACUTE CALCULOUS CHOLECYSTITIS: Primary | ICD-10-CM

## 2024-12-30 LAB
ABO + RH BLD: NORMAL
B-HCG UR QL: NEGATIVE
BLD GP AB SCN CELLS X3 SERPL QL: NORMAL
BLD GP AB SCN CELLS X3 SERPL QL: NORMAL
CTP QC/QA: YES
SPECIMEN OUTDATE: NORMAL

## 2024-12-30 PROCEDURE — 94761 N-INVAS EAR/PLS OXIMETRY MLT: CPT

## 2024-12-30 PROCEDURE — 25000003 PHARM REV CODE 250: Performed by: NURSE ANESTHETIST, CERTIFIED REGISTERED

## 2024-12-30 PROCEDURE — 88304 TISSUE EXAM BY PATHOLOGIST: CPT | Performed by: PATHOLOGY

## 2024-12-30 PROCEDURE — 63600175 PHARM REV CODE 636 W HCPCS: Performed by: ANESTHESIOLOGY

## 2024-12-30 PROCEDURE — 63600175 PHARM REV CODE 636 W HCPCS: Mod: JZ,JG | Performed by: NURSE ANESTHETIST, CERTIFIED REGISTERED

## 2024-12-30 PROCEDURE — 63600175 PHARM REV CODE 636 W HCPCS: Performed by: SURGERY

## 2024-12-30 PROCEDURE — 25000003 PHARM REV CODE 250: Performed by: SURGERY

## 2024-12-30 PROCEDURE — 37000009 HC ANESTHESIA EA ADD 15 MINS: Performed by: SURGERY

## 2024-12-30 PROCEDURE — 63600175 PHARM REV CODE 636 W HCPCS: Performed by: NURSE ANESTHETIST, CERTIFIED REGISTERED

## 2024-12-30 PROCEDURE — 25000003 PHARM REV CODE 250: Performed by: ANESTHESIOLOGY

## 2024-12-30 PROCEDURE — 94799 UNLISTED PULMONARY SVC/PX: CPT

## 2024-12-30 PROCEDURE — 71000033 HC RECOVERY, INTIAL HOUR: Performed by: SURGERY

## 2024-12-30 PROCEDURE — 27201423 OPTIME MED/SURG SUP & DEVICES STERILE SUPPLY: Performed by: SURGERY

## 2024-12-30 PROCEDURE — 36000711: Performed by: SURGERY

## 2024-12-30 PROCEDURE — 8E0W4CZ ROBOTIC ASSISTED PROCEDURE OF TRUNK REGION, PERCUTANEOUS ENDOSCOPIC APPROACH: ICD-10-PCS | Performed by: SURGERY

## 2024-12-30 PROCEDURE — 86850 RBC ANTIBODY SCREEN: CPT | Performed by: SURGERY

## 2024-12-30 PROCEDURE — 99900035 HC TECH TIME PER 15 MIN (STAT)

## 2024-12-30 PROCEDURE — P9045 ALBUMIN (HUMAN), 5%, 250 ML: HCPCS | Mod: JZ,JG | Performed by: NURSE ANESTHETIST, CERTIFIED REGISTERED

## 2024-12-30 PROCEDURE — 0FT40ZZ RESECTION OF GALLBLADDER, OPEN APPROACH: ICD-10-PCS | Performed by: SURGERY

## 2024-12-30 PROCEDURE — 86901 BLOOD TYPING SEROLOGIC RH(D): CPT | Performed by: SURGERY

## 2024-12-30 PROCEDURE — 37000008 HC ANESTHESIA 1ST 15 MINUTES: Performed by: SURGERY

## 2024-12-30 PROCEDURE — 0FJ44ZZ INSPECTION OF GALLBLADDER, PERCUTANEOUS ENDOSCOPIC APPROACH: ICD-10-PCS | Performed by: SURGERY

## 2024-12-30 PROCEDURE — 71000039 HC RECOVERY, EACH ADD'L HOUR: Performed by: SURGERY

## 2024-12-30 PROCEDURE — 81025 URINE PREGNANCY TEST: CPT | Performed by: ANESTHESIOLOGY

## 2024-12-30 PROCEDURE — 88304 TISSUE EXAM BY PATHOLOGIST: CPT | Mod: 26,,, | Performed by: PATHOLOGY

## 2024-12-30 PROCEDURE — 36000710: Performed by: SURGERY

## 2024-12-30 PROCEDURE — 11000001 HC ACUTE MED/SURG PRIVATE ROOM

## 2024-12-30 RX ORDER — DEXAMETHASONE SODIUM PHOSPHATE 4 MG/ML
INJECTION, SOLUTION INTRA-ARTICULAR; INTRALESIONAL; INTRAMUSCULAR; INTRAVENOUS; SOFT TISSUE
Status: DISCONTINUED | OUTPATIENT
Start: 2024-12-30 | End: 2024-12-30

## 2024-12-30 RX ORDER — HYDROMORPHONE HYDROCHLORIDE 2 MG/ML
0.5 INJECTION, SOLUTION INTRAMUSCULAR; INTRAVENOUS; SUBCUTANEOUS EVERY 6 HOURS PRN
Status: DISCONTINUED | OUTPATIENT
Start: 2024-12-30 | End: 2025-01-03 | Stop reason: HOSPADM

## 2024-12-30 RX ORDER — CEFAZOLIN 2 G/1
2 INJECTION, POWDER, FOR SOLUTION INTRAMUSCULAR; INTRAVENOUS
Status: CANCELLED | OUTPATIENT
Start: 2024-12-30

## 2024-12-30 RX ORDER — SODIUM CHLORIDE, SODIUM LACTATE, POTASSIUM CHLORIDE, CALCIUM CHLORIDE 600; 310; 30; 20 MG/100ML; MG/100ML; MG/100ML; MG/100ML
INJECTION, SOLUTION INTRAVENOUS CONTINUOUS
Status: DISCONTINUED | OUTPATIENT
Start: 2024-12-30 | End: 2024-12-30

## 2024-12-30 RX ORDER — OXYCODONE HYDROCHLORIDE 5 MG/1
5 TABLET ORAL
Status: DISCONTINUED | OUTPATIENT
Start: 2024-12-30 | End: 2024-12-30

## 2024-12-30 RX ORDER — BUPIVACAINE HYDROCHLORIDE 2.5 MG/ML
INJECTION, SOLUTION EPIDURAL; INFILTRATION; INTRACAUDAL
Status: DISCONTINUED | OUTPATIENT
Start: 2024-12-30 | End: 2024-12-30 | Stop reason: HOSPADM

## 2024-12-30 RX ORDER — LIDOCAINE HYDROCHLORIDE AND EPINEPHRINE 10; 10 UG/ML; MG/ML
INJECTION, SOLUTION INFILTRATION; PERINEURAL
Status: DISCONTINUED | OUTPATIENT
Start: 2024-12-30 | End: 2024-12-30 | Stop reason: HOSPADM

## 2024-12-30 RX ORDER — ONDANSETRON HYDROCHLORIDE 2 MG/ML
8 INJECTION, SOLUTION INTRAVENOUS EVERY 12 HOURS PRN
Status: DISCONTINUED | OUTPATIENT
Start: 2024-12-30 | End: 2025-01-03 | Stop reason: HOSPADM

## 2024-12-30 RX ORDER — FENTANYL CITRATE 50 UG/ML
INJECTION, SOLUTION INTRAMUSCULAR; INTRAVENOUS
Status: DISCONTINUED | OUTPATIENT
Start: 2024-12-30 | End: 2024-12-30

## 2024-12-30 RX ORDER — ONDANSETRON HYDROCHLORIDE 2 MG/ML
INJECTION, SOLUTION INTRAMUSCULAR; INTRAVENOUS
Status: DISCONTINUED | OUTPATIENT
Start: 2024-12-30 | End: 2024-12-30

## 2024-12-30 RX ORDER — HYDROMORPHONE HYDROCHLORIDE 2 MG/ML
0.4 INJECTION, SOLUTION INTRAMUSCULAR; INTRAVENOUS; SUBCUTANEOUS EVERY 5 MIN PRN
Status: DISCONTINUED | OUTPATIENT
Start: 2024-12-30 | End: 2024-12-30

## 2024-12-30 RX ORDER — ALBUMIN HUMAN 50 G/1000ML
SOLUTION INTRAVENOUS
Status: DISCONTINUED | OUTPATIENT
Start: 2024-12-30 | End: 2024-12-30

## 2024-12-30 RX ORDER — MEPERIDINE HYDROCHLORIDE 25 MG/ML
12.5 INJECTION INTRAMUSCULAR; INTRAVENOUS; SUBCUTANEOUS ONCE AS NEEDED
Status: DISCONTINUED | OUTPATIENT
Start: 2024-12-30 | End: 2024-12-30

## 2024-12-30 RX ORDER — PROPOFOL 10 MG/ML
VIAL (ML) INTRAVENOUS CONTINUOUS PRN
Status: DISCONTINUED | OUTPATIENT
Start: 2024-12-30 | End: 2024-12-30

## 2024-12-30 RX ORDER — ROCURONIUM BROMIDE 10 MG/ML
INJECTION, SOLUTION INTRAVENOUS
Status: DISCONTINUED | OUTPATIENT
Start: 2024-12-30 | End: 2024-12-30

## 2024-12-30 RX ORDER — PROCHLORPERAZINE EDISYLATE 5 MG/ML
5 INJECTION INTRAMUSCULAR; INTRAVENOUS EVERY 30 MIN PRN
Status: DISCONTINUED | OUTPATIENT
Start: 2024-12-30 | End: 2024-12-30

## 2024-12-30 RX ORDER — GLUCAGON 1 MG
1 KIT INJECTION
Status: DISCONTINUED | OUTPATIENT
Start: 2024-12-30 | End: 2024-12-30

## 2024-12-30 RX ORDER — SODIUM CHLORIDE 9 MG/ML
INJECTION, SOLUTION INTRAVENOUS CONTINUOUS
Status: CANCELLED | OUTPATIENT
Start: 2024-12-30

## 2024-12-30 RX ORDER — VECURONIUM BROMIDE 1 MG/ML
INJECTION, POWDER, LYOPHILIZED, FOR SOLUTION INTRAVENOUS
Status: DISCONTINUED | OUTPATIENT
Start: 2024-12-30 | End: 2024-12-30

## 2024-12-30 RX ORDER — LIDOCAINE HYDROCHLORIDE 20 MG/ML
INJECTION INTRAVENOUS
Status: DISCONTINUED | OUTPATIENT
Start: 2024-12-30 | End: 2024-12-30

## 2024-12-30 RX ORDER — SODIUM CHLORIDE 0.9 % (FLUSH) 0.9 %
3 SYRINGE (ML) INJECTION
Status: DISCONTINUED | OUTPATIENT
Start: 2024-12-30 | End: 2024-12-30

## 2024-12-30 RX ORDER — OXYCODONE AND ACETAMINOPHEN 5; 325 MG/1; MG/1
1 TABLET ORAL EVERY 4 HOURS PRN
Status: DISCONTINUED | OUTPATIENT
Start: 2024-12-30 | End: 2025-01-01

## 2024-12-30 RX ORDER — PROPOFOL 10 MG/ML
VIAL (ML) INTRAVENOUS
Status: DISCONTINUED | OUTPATIENT
Start: 2024-12-30 | End: 2024-12-30

## 2024-12-30 RX ADMIN — VECURONIUM BROMIDE FOR INJECTION 2 MG: 1 INJECTION, POWDER, LYOPHILIZED, FOR SOLUTION INTRAVENOUS at 10:12

## 2024-12-30 RX ADMIN — ONDANSETRON 4 MG: 2 INJECTION INTRAMUSCULAR; INTRAVENOUS at 01:12

## 2024-12-30 RX ADMIN — ROCURONIUM BROMIDE 20 MG: 10 SOLUTION INTRAVENOUS at 10:12

## 2024-12-30 RX ADMIN — FENTANYL CITRATE 50 MCG: 50 INJECTION, SOLUTION INTRAMUSCULAR; INTRAVENOUS at 12:12

## 2024-12-30 RX ADMIN — VECURONIUM BROMIDE FOR INJECTION 3 MG: 1 INJECTION, POWDER, LYOPHILIZED, FOR SOLUTION INTRAVENOUS at 08:12

## 2024-12-30 RX ADMIN — SODIUM CHLORIDE 2 G: 9 INJECTION, SOLUTION INTRAVENOUS at 11:12

## 2024-12-30 RX ADMIN — ALBUMIN (HUMAN) 100 ML: 12.5 SOLUTION INTRAVENOUS at 12:12

## 2024-12-30 RX ADMIN — PROPOFOL 40 MG: 10 INJECTION, EMULSION INTRAVENOUS at 01:12

## 2024-12-30 RX ADMIN — FENTANYL CITRATE 50 MCG: 50 INJECTION, SOLUTION INTRAMUSCULAR; INTRAVENOUS at 08:12

## 2024-12-30 RX ADMIN — VECURONIUM BROMIDE FOR INJECTION 2 MG: 1 INJECTION, POWDER, LYOPHILIZED, FOR SOLUTION INTRAVENOUS at 09:12

## 2024-12-30 RX ADMIN — ROCURONIUM BROMIDE 20 MG: 10 SOLUTION INTRAVENOUS at 12:12

## 2024-12-30 RX ADMIN — LIDOCAINE HYDROCHLORIDE 60 MG: 20 INJECTION, SOLUTION INTRAVENOUS at 07:12

## 2024-12-30 RX ADMIN — SODIUM CHLORIDE 2 G: 9 INJECTION, SOLUTION INTRAVENOUS at 07:12

## 2024-12-30 RX ADMIN — SODIUM CHLORIDE: 0.9 INJECTION, SOLUTION INTRAVENOUS at 10:12

## 2024-12-30 RX ADMIN — ALBUMIN (HUMAN) 100 ML: 12.5 SOLUTION INTRAVENOUS at 11:12

## 2024-12-30 RX ADMIN — OXYCODONE HYDROCHLORIDE AND ACETAMINOPHEN 1 TABLET: 5; 325 TABLET ORAL at 07:12

## 2024-12-30 RX ADMIN — SUGAMMADEX 200 MG: 100 INJECTION, SOLUTION INTRAVENOUS at 02:12

## 2024-12-30 RX ADMIN — PROPOFOL 50 MG: 10 INJECTION, EMULSION INTRAVENOUS at 10:12

## 2024-12-30 RX ADMIN — HYDROMORPHONE HYDROCHLORIDE 0.5 MG: 2 INJECTION INTRAMUSCULAR; INTRAVENOUS; SUBCUTANEOUS at 09:12

## 2024-12-30 RX ADMIN — SODIUM CHLORIDE: 0.9 INJECTION, SOLUTION INTRAVENOUS at 07:12

## 2024-12-30 RX ADMIN — ALBUMIN (HUMAN) 400 ML: 12.5 SOLUTION INTRAVENOUS at 12:12

## 2024-12-30 RX ADMIN — FENTANYL CITRATE 100 MCG: 50 INJECTION, SOLUTION INTRAMUSCULAR; INTRAVENOUS at 07:12

## 2024-12-30 RX ADMIN — FENTANYL CITRATE 50 MCG: 50 INJECTION, SOLUTION INTRAMUSCULAR; INTRAVENOUS at 10:12

## 2024-12-30 RX ADMIN — FENTANYL CITRATE 50 MCG: 50 INJECTION, SOLUTION INTRAMUSCULAR; INTRAVENOUS at 02:12

## 2024-12-30 RX ADMIN — OXYCODONE HYDROCHLORIDE 5 MG: 5 TABLET ORAL at 03:12

## 2024-12-30 RX ADMIN — PROPOFOL 150 MCG/KG/MIN: 10 INJECTION, EMULSION INTRAVENOUS at 01:12

## 2024-12-30 RX ADMIN — HYDROMORPHONE HYDROCHLORIDE 0.4 MG: 2 INJECTION INTRAMUSCULAR; INTRAVENOUS; SUBCUTANEOUS at 02:12

## 2024-12-30 RX ADMIN — SUGAMMADEX 100 MG: 100 INJECTION, SOLUTION INTRAVENOUS at 02:12

## 2024-12-30 RX ADMIN — PROPOFOL 40 MG: 10 INJECTION, EMULSION INTRAVENOUS at 10:12

## 2024-12-30 RX ADMIN — DEXAMETHASONE SODIUM PHOSPHATE 8 MG: 4 INJECTION, SOLUTION INTRAMUSCULAR; INTRAVENOUS at 07:12

## 2024-12-30 RX ADMIN — HYDROMORPHONE HYDROCHLORIDE 0.4 MG: 2 INJECTION INTRAMUSCULAR; INTRAVENOUS; SUBCUTANEOUS at 03:12

## 2024-12-30 RX ADMIN — ONDANSETRON 8 MG: 2 INJECTION INTRAMUSCULAR; INTRAVENOUS at 10:12

## 2024-12-30 RX ADMIN — VECURONIUM BROMIDE FOR INJECTION 3 MG: 1 INJECTION, POWDER, LYOPHILIZED, FOR SOLUTION INTRAVENOUS at 09:12

## 2024-12-30 RX ADMIN — ROCURONIUM BROMIDE 20 MG: 10 SOLUTION INTRAVENOUS at 11:12

## 2024-12-30 RX ADMIN — ALBUMIN (HUMAN) 200 ML: 12.5 SOLUTION INTRAVENOUS at 01:12

## 2024-12-30 RX ADMIN — ROCURONIUM BROMIDE 50 MG: 10 SOLUTION INTRAVENOUS at 07:12

## 2024-12-30 RX ADMIN — PROPOFOL 200 MG: 10 INJECTION, EMULSION INTRAVENOUS at 07:12

## 2024-12-30 NOTE — ANESTHESIA PREPROCEDURE EVALUATION
12/30/2024  Nadir Abernathy is a 54 y.o., female.      Pre-op Assessment    I have reviewed the Patient Summary Reports.     I have reviewed the Nursing Notes. I have reviewed the NPO Status.   I have reviewed the Medications.     Review of Systems  Anesthesia Hx:  No problems with previous Anesthesia             Denies Family Hx of Anesthesia complications.    Denies Personal Hx of Anesthesia complications.                    Social:  Non-Smoker       Hematology/Oncology:  Hematology Normal   Oncology Normal                                   EENT/Dental:  EENT/Dental Normal           Cardiovascular:  Exercise tolerance: good   Hypertension                                          Pulmonary:  Pulmonary Normal                       Renal/:  Renal/ Normal                 Musculoskeletal:  Musculoskeletal Normal                Neurological:  Neurology Normal                                      Endocrine:  Endocrine Normal          Morbid Obesity / BMI > 40  Dermatological:  Skin Normal    Psych:  Psychiatric Normal                    Physical Exam  General: Well nourished, Cooperative, Oriented and Alert    Airway:  Mallampati: II / II  Mouth Opening: Normal  TM Distance: Normal  Neck ROM: Normal ROM    Dental:  Intact        Anesthesia Plan  Type of Anesthesia, risks & benefits discussed:    Anesthesia Type: Gen ETT  Intra-op Monitoring Plan: Standard ASA Monitors  Post Op Pain Control Plan: multimodal analgesia  Induction:  IV  Airway Plan: Video and Direct  Informed Consent: Informed consent signed with the Patient and all parties understand the risks and agree with anesthesia plan.  All questions answered.   ASA Score: 3  Day of Surgery Review of History & Physical: H&P Update referred to the surgeon/provider.    Ready For Surgery From Anesthesia Perspective.     .

## 2024-12-30 NOTE — ANESTHESIA PROCEDURE NOTES
Intubation    Date/Time: 12/30/2024 7:46 AM    Performed by: Abby Holland CRNA  Authorized by: Nathan New MD    Intubation:     Induction:  Intravenous    Intubated:  Postinduction    Mask Ventilation:  Easy mask    Attempts:  1    Attempted By:  CRNA    Method of Intubation:  Video laryngoscopy    Blade:  Abreu 3    Laryngeal View Grade: Grade I - full view of cords      Difficult Airway Encountered?: No      Complications:  None    Airway Device:  Oral endotracheal tube    Airway Device Size:  7.5    Style/Cuff Inflation:  Cuffed    Inflation Amount (mL):  4    Tube secured:  22    Secured at:  The lips    Placement Verified By:  Capnometry    Complicating Factors:  None    Findings Post-Intubation:  BS equal bilateral

## 2024-12-30 NOTE — H&P
History & Physical     SUBJECTIVE:      History of Present Illness:  Patient is a 54 y.o. female referred emergency department after recent presentation there.  She did not meet admission criteria for cholecystitis.  She presented there on 10/26/2020 1 day after a party she had gone to.  She had a cocktails and a large amount of food.  She began to experience epigastric pain radiating to to the right side.  She nauseated and vomited until contents were clear yellow.  LFTs were normal and she did not have other signs of acute cholecystitis.  Pain resolved with Toradol and Zofran.  She was referred from the emergency department to general surgery clinic.     Currently she does not have chronic pain.  Some pain with certain it is.  No nausea currently.  Diagnosed with peptic ulcer disease and continues to take omeprazole for GERD symptoms. When going to the ER the pain was  unlike her peptic pain much  more intense         Chief Complaint   Patient presents with    Gall Bladder Problem              Review of patient's allergies indicates:   Allergen Reactions    Shellfish containing products Anaphylaxis         Current Medications          Current Outpatient Medications   Medication Sig Dispense Refill    hydroCHLOROthiazide (MICROZIDE) 12.5 mg capsule Take 1 capsule (12.5 mg total) by mouth daily as needed (leg swelling). 90 capsule 3    HYDROcodone-acetaminophen (NORCO) 5-325 mg per tablet Take 1 tablet by mouth every 4 (four) hours as needed for Pain. 12 tablet 0    omeprazole (PRILOSEC) 20 MG capsule Take 1 capsule (20 mg total) by mouth once daily. 90 capsule 0    ondansetron (ZOFRAN-ODT) 4 MG TbDL Take 1 tablet (4 mg total) by mouth every 6 (six) hours as needed (nausea). 12 tablet 0    acetaminophen (TYLENOL) 500 MG tablet Take 1 tablet (500 mg total) by mouth every 6 (six) hours as needed for Pain or Temperature greater than. (Patient not taking: Reported on 10/30/2024) 20 tablet 0    diclofenac sodium  (VOLTAREN) 1 % Gel Apply 2 g topically 4 (four) times daily. (Patient not taking: Reported on 10/30/2024) 350 g 2    nabumetone (RELAFEN) 500 MG tablet Take 1.5 tablets (750 mg total) by mouth once daily. Start AFTER completion of Medrol dose min. (Patient not taking: Reported on 7/24/2024) 60 tablet 1      No current facility-administered medications for this visit.                 Past Medical History:   Diagnosis Date    Fibroids 1995    Hypertension              Past Surgical History:   Procedure Laterality Date    UTERINE FIBROID SURGERY                 Family History   Problem Relation Name Age of Onset    Diabetes Father        Breast cancer Neg Hx        Ovarian cancer Neg Hx          Social History   Social History            Tobacco Use    Smoking status: Never    Smokeless tobacco: Never   Substance Use Topics    Alcohol use: Yes       Alcohol/week: 0.0 standard drinks of alcohol    Drug use: No            Review of Systems:  Review of Systems  See HPI  Negative for chest pain shortness of breath , diarrhea     OBJECTIVE:      Vital Signs (Most Recent)  Vitals:    12/30/24 0604   BP: 119/84   Pulse: 85   Resp: 20   Temp: 98.3 °F (36.8 °C)      Physical Exam:  Physical Exam  Constitutional:       General: She is not in acute distress.     Appearance: Normal appearance.   Eyes:      General: No scleral icterus.  Cardiovascular:      Rate and Rhythm: Normal rate and regular rhythm.   Pulmonary:      Breath sounds: Normal breath sounds.   Abdominal:      General: There is no distension.      Palpations: Abdomen is soft.      Tenderness: There is abdominal tenderness.      Comments: Tenderness limited to the right upper quadrant with deep palpation only.  Negative Aguilera's sign   Musculoskeletal:         General: Normal range of motion.      Cervical back: Neck supple.   Lymphadenopathy:      Cervical: No cervical adenopathy.   Skin:     General: Skin is warm and dry.      Findings: No rash.   Neurological:       General: No focal deficit present.      Mental Status: She is alert.   Psychiatric:         Mood and Affect: Mood normal.            Laboratory  LFTs: Reviewed  LFTs normal     Diagnostic Results:  US: Reviewed  Normal common bile duct.  No Gallbladder wall edema.  Significant amount of stones heading down towards the neck according to this examiner     ASSESSMENT/PLAN:      Chronic calculous cholecystitis     PLAN:  Plan  Robotic cholecystectomy  Patient understands reasons for conversion to laparoscopic or open procedure     The general risks of the operation were described to the patient in detail and included on an informed consent sheet, which I reviewed with the patientand which the patient has signed.  Illustrations were provided for clarity.  The patient was offered the opportunity to ask questions, on multiple occasions, and after continued discussion had none additional.  The patient is ready to proceed with the operation.  However, she wants to check her work schedule and finances prior to booking a day.  She still has some tenderness and her stones seemed to be, on ultrasound, in a precarious position to obstruct the cystic duct.  I explained all of this to the patient to encourage her to schedule very quickly to avoid emergency admission the pancreatitis/jaundice

## 2024-12-30 NOTE — TRANSFER OF CARE
"Anesthesia Transfer of Care Note    Patient: Nadir Abernathy    Procedure(s) Performed: Procedure(s) (LRB):  ROBOTIC CHOLECYSTECTOMY, attempted & converted to open (N/A)  CHOLECYSTECTOMY (N/A)    Patient location: PACU    Anesthesia Type: general    Transport from OR: Transported from OR on 6-10 L/min O2 by face mask with adequate spontaneous ventilation    Post pain: adequate analgesia    Post assessment: no apparent anesthetic complications    Post vital signs: stable    Level of consciousness: awake and sedated    Nausea/Vomiting: no nausea/vomiting    Complications: none    Transfer of care protocol was followed      Last vitals: Visit Vitals  /84 (BP Location: Left arm, Patient Position: Lying)   Pulse 85   Temp 36.8 °C (98.3 °F) (Oral)   Resp 20   Ht 5' 2" (1.575 m)   Wt 108.9 kg (240 lb 1.3 oz)   LMP 12/03/2024 (Approximate)   SpO2 100%   Breastfeeding No   BMI 43.91 kg/m²     "

## 2024-12-30 NOTE — OP NOTE
DATE OF PROCEDURE: 12/30/2024      PREOPERATIVE DIAGNOSIS:  Chronic calculous cholecystitis  POSTOPERATIVE DIAGNOSIS:  Same    PROCEDURE PERFORMED:  Robotic XI attempted cholecystectomy converted to open cholecystectomy    Difficulty modifier 22 for conversion to open, extensive dissection, and operative time greater than 5 hours    ATTENDING SURGEON: Chirag Acuna MD.       ANESTHESIA: General endotracheal.     ESTIMATED BLOOD LOSS: 500 mL.     FINDINGS:  Severe chronic cholecystitis with signs of past necrosis and then fibrosis    SPECIMENs:  Gallbladder and gallstones    DRAINS:  19 [place] Kaleb    COMPLICATIONS: None.     PROCEDURE IN DETAIL:     The patient was identified in Preoperative Holding and  brought back to the Operating Room. Placed supine on the operating table and padded appropriately. Monitors were applied and there was smooth induction of general endotracheal anesthesia.  The patient's abdomen was prepped and draped in the standard sterile surgical fashion. A time-out was performed and all team members present agreed this was the correct procedure on the correct patient. We also confirmed administration of appropriate preoperative antibiotics.     A #12 Carmona port was placed into the peritoneal cavity using an open cutdown technique visualizing all layers at the level of the umbilicus.  The abdomen was insufflated with carbon dioxide to a maximum pressure of 15 mmHg. Scope was placed and the abdomen was examined. There was no evidence of injury from the initial trocar placement.  Three additional robotic ports were placed under direct vision atraumatically across the abdomen at the same level.  Robot was brought in and safely docked to the ports.  All instruments were inserted under direct vision to the target.  I then go to the console to perform the procedure.  Two layers of dense omentum were adhered to the gallbladder and to the liver.  These adhesions were elevated and divided with  electrocautery.  Despite this , I could only identify 1 sq cm of fundus and it was not graspable due to adhesions and fibrosis.The gallbladder remains socked in with the inflammatory reaction of the omentum .  It appeared that the duodenum was adhesed to the gallbladder and possibly the liver.  After approximately 45 minutes of dissection, the gallbladder still could not be grasped and I converted to an open procedure.   Remaining robotic instruments were removed under direct vision atraumatically.  Robot is then undocked and safely stowed.   All remaining ports were removed under direct vision and no bleeding from any port site was noted. The insufflation of the abdomen was evacuated and the endoscope was removed. The fascial incision at the umbilical port site was closed with 0 PDS stitch in a figure of eight fashion.  Incisions were closed in a subcuticular fashion with 4-0 Vicryl.  Steri-Strips were placed.  Arms were untucked and the abdomen was re-prepped and draped in sterile fashion.  Counts and gloves were changed.  Right subcostal incision was made and taken down through the subcutaneous fat with electrocautery to expose the fascia.  Muscle and fascia was divided with electrocautery 2 fingerbreadths below the costal margin.  Entrance into the abdomen was gained safely.  Bookwalter retractor was utilized.  Dissection around the fundus was continued.  We began top down dissection  the gallbladder from the liver bed the gallbladder remained adhesed and placed deeper in the abdomen.  Every time the gallbladder was grasped and elevated the gallbladder tore.  Wall was difficult to identify as she would likely it had acute gangrenous gallbladder at sometime in the past and had dense reactive inflammatory response around it.  As we worked posteriorly down the gallbladder there was some fatty necrosis around the gallbladder as well.  The fundus was removed and inspection down inside the gallbladder was  performed.  All the gallstones were removed.  Several were lodged down in the infundibulum.  Planes around the gallbladder were difficult to develop this was especially true towards the duodenum.  With my finger inside the gallbladder, we are able to separate the duodenum by dividing the wall of the gallbladder and preserving the duodenum.  The top 3/4 of the gallbladder were basically removed piecemeal.  Several small vessels were divided with electrocautery but the cystic artery was never definitively identified.  At completion of the dissection we had a cuff of infundibulum.  This was the 1st level where the true gallbladder wall actually appeared viable.  Further dissection towards the cystic duct was met with dense inflammation and it was difficult discern anything regarding the betty hepatis.  We had no bile flowing back even with the stones removed suggesting that the cystic duct had been obliterated by stone impaction even after all of stones had been removed.  The infundibulum cuff was closed with a running 2-0 silk suture.  It was placed through viable tissue.  It appeared to totally occlude the cuff with no remaining lumen.  All dissected surfaces were irrigated and appeared hemostatic.  Hemostatic powder was applied as a precaution.  Nineteen Barbadian Kaleb drain was brought in through a separate 1 cm incision lateral to the main incision and was sutured to the skin with 2-0 silk.  Closure of the muscle was then performed with a running 1 PDS.  All layers were included in each throw.  Karla's fascia was loosely closed with a running 3-0 Vicryl suture.  Multiple 3-0 Vicryl dermal interrupted stitches were thrown to realign the wound.  And provide some strength.  Skin was then closed with staples.  Sterile dressings were placed.  We had multiple counts throughout the case and all were correct.  The patient was extubated in the Operating Room and transported to the Recovery Room in stable condition. All  sponge, instrument and needle counts were correct at the end of the case. I was present and scrubbed for the entire procedure.

## 2024-12-30 NOTE — ANESTHESIA POSTPROCEDURE EVALUATION
Anesthesia Post Evaluation    Patient: Nadir Abernathy    Procedure(s) Performed: Procedure(s) (LRB):  ROBOTIC CHOLECYSTECTOMY, attempted & converted to open (N/A)  CHOLECYSTECTOMY (N/A)    Final Anesthesia Type: general      Patient location during evaluation: PACU  Patient participation: Yes- Able to Participate  Level of consciousness: awake and alert  Post-procedure vital signs: reviewed and stable  Pain management: adequate  Airway patency: patent    PONV status at discharge: No PONV  Anesthetic complications: no      Cardiovascular status: blood pressure returned to baseline  Respiratory status: unassisted and spontaneous ventilation  Hydration status: euvolemic  Follow-up not needed.              Vitals Value Taken Time   /82 12/30/24 1502   Temp 36.2 °C (97.2 °F) 12/30/24 1437   Pulse 93 12/30/24 1510   Resp 16 12/30/24 1501   SpO2 100 % 12/30/24 1510   Vitals shown include unfiled device data.      No case tracking events are documented in the log.      Pain/Abdiel Score: Pain Rating Prior to Med Admin: 7 (12/30/2024  3:05 PM)  Abdiel Score: 9 (12/30/2024  2:52 PM)

## 2024-12-30 NOTE — BRIEF OP NOTE
[facility] ([place])  Brief Operative Note    SUMMARY     Surgery Date: 12/30/2024     Surgeons and Role:     * Chirag Acuna MD - Primary    Assisting Surgeon: None    Pre-op Diagnosis:  Gallstone [K80.20]    Post-op Diagnosis:  Post-Op Diagnosis Codes:     * Gallstone [K80.20]  Severe chronic calculous cholecystitis    Procedure(s) (LRB):  ROBOTIC CHOLECYSTECTOMY, attempted & converted to open (N/A)  CHOLECYSTECTOMY (N/A)  Difficulty modifier 22    Anesthesia: General    Implants:  * No implants in log *    Operative Findings:  Severe chronic cholecystitis.  There was signs of necrosis in places    Estimated Blood Loss: 700 mL    Estimated Blood Loss has been documented.         Specimens:   Specimen (24h ago, onward)       Start     Ordered    12/30/24 1402  Specimen to Pathology, Surgery General Surgery  Once        Comments: Pre-op Diagnosis: Gallstone [K80.20]Procedure(s):ROBOTIC CHOLECYSTECTOMYCHOLECYSTECTOMY Number of specimens: 1Name of specimens: 1. GALLBLADDER AND STONES - PERM     References:    Click here for ordering Quick Tip   Question Answer Comment   Procedure Type: General Surgery    Specimen Class: Routine/Screening    Release to patient Immediate        12/30/24 1401                    [id number]

## 2024-12-31 LAB
ALBUMIN SERPL BCP-MCNC: 3.6 G/DL (ref 3.5–5.2)
ALP SERPL-CCNC: 68 U/L (ref 40–150)
ALT SERPL W/O P-5'-P-CCNC: 31 U/L (ref 10–44)
ANION GAP SERPL CALC-SCNC: 11 MMOL/L (ref 8–16)
AST SERPL-CCNC: 45 U/L (ref 10–40)
BASOPHILS # BLD AUTO: 0.02 K/UL (ref 0–0.2)
BASOPHILS NFR BLD: 0.2 % (ref 0–1.9)
BILIRUB SERPL-MCNC: 0.3 MG/DL (ref 0.1–1)
BUN SERPL-MCNC: 6 MG/DL (ref 6–20)
CALCIUM SERPL-MCNC: 9.9 MG/DL (ref 8.7–10.5)
CHLORIDE SERPL-SCNC: 108 MMOL/L (ref 95–110)
CO2 SERPL-SCNC: 21 MMOL/L (ref 23–29)
CREAT SERPL-MCNC: 0.6 MG/DL (ref 0.5–1.4)
DIFFERENTIAL METHOD BLD: ABNORMAL
EOSINOPHIL # BLD AUTO: 0 K/UL (ref 0–0.5)
EOSINOPHIL NFR BLD: 0.1 % (ref 0–8)
ERYTHROCYTE [DISTWIDTH] IN BLOOD BY AUTOMATED COUNT: 12.5 % (ref 11.5–14.5)
EST. GFR  (NO RACE VARIABLE): >60 ML/MIN/1.73 M^2
GLUCOSE SERPL-MCNC: 94 MG/DL (ref 70–110)
HCT VFR BLD AUTO: 35.4 % (ref 37–48.5)
HGB BLD-MCNC: 11.3 G/DL (ref 12–16)
IMM GRANULOCYTES # BLD AUTO: 0.05 K/UL (ref 0–0.04)
IMM GRANULOCYTES NFR BLD AUTO: 0.5 % (ref 0–0.5)
LYMPHOCYTES # BLD AUTO: 2 K/UL (ref 1–4.8)
LYMPHOCYTES NFR BLD: 18.3 % (ref 18–48)
MCH RBC QN AUTO: 28.3 PG (ref 27–31)
MCHC RBC AUTO-ENTMCNC: 31.9 G/DL (ref 32–36)
MCV RBC AUTO: 89 FL (ref 82–98)
MONOCYTES # BLD AUTO: 0.8 K/UL (ref 0.3–1)
MONOCYTES NFR BLD: 7.6 % (ref 4–15)
NEUTROPHILS # BLD AUTO: 8.1 K/UL (ref 1.8–7.7)
NEUTROPHILS NFR BLD: 73.3 % (ref 38–73)
NRBC BLD-RTO: 0 /100 WBC
PLATELET # BLD AUTO: 362 K/UL (ref 150–450)
PMV BLD AUTO: 8.8 FL (ref 9.2–12.9)
POTASSIUM SERPL-SCNC: 3.6 MMOL/L (ref 3.5–5.1)
PROT SERPL-MCNC: 7.3 G/DL (ref 6–8.4)
RBC # BLD AUTO: 4 M/UL (ref 4–5.4)
SODIUM SERPL-SCNC: 140 MMOL/L (ref 136–145)
WBC # BLD AUTO: 10.99 K/UL (ref 3.9–12.7)

## 2024-12-31 PROCEDURE — 63600175 PHARM REV CODE 636 W HCPCS: Performed by: SURGERY

## 2024-12-31 PROCEDURE — 11000001 HC ACUTE MED/SURG PRIVATE ROOM

## 2024-12-31 PROCEDURE — 25000003 PHARM REV CODE 250: Performed by: SURGERY

## 2024-12-31 PROCEDURE — 85025 COMPLETE CBC W/AUTO DIFF WBC: CPT | Performed by: SURGERY

## 2024-12-31 PROCEDURE — 36415 COLL VENOUS BLD VENIPUNCTURE: CPT | Performed by: SURGERY

## 2024-12-31 PROCEDURE — 94761 N-INVAS EAR/PLS OXIMETRY MLT: CPT

## 2024-12-31 PROCEDURE — 80053 COMPREHEN METABOLIC PANEL: CPT | Performed by: SURGERY

## 2024-12-31 RX ADMIN — OXYCODONE HYDROCHLORIDE AND ACETAMINOPHEN 1 TABLET: 5; 325 TABLET ORAL at 09:12

## 2024-12-31 RX ADMIN — OXYCODONE HYDROCHLORIDE AND ACETAMINOPHEN 1 TABLET: 5; 325 TABLET ORAL at 01:12

## 2024-12-31 RX ADMIN — HYDROMORPHONE HYDROCHLORIDE 0.5 MG: 2 INJECTION INTRAMUSCULAR; INTRAVENOUS; SUBCUTANEOUS at 05:12

## 2024-12-31 RX ADMIN — HYDROMORPHONE HYDROCHLORIDE 0.5 MG: 2 INJECTION INTRAMUSCULAR; INTRAVENOUS; SUBCUTANEOUS at 06:12

## 2024-12-31 RX ADMIN — OXYCODONE HYDROCHLORIDE AND ACETAMINOPHEN 1 TABLET: 5; 325 TABLET ORAL at 08:12

## 2024-12-31 NOTE — PLAN OF CARE
Problem: Adult Inpatient Plan of Care  Goal: Plan of Care Review  Outcome: Progressing  Goal: Patient-Specific Goal (Individualized)  Outcome: Progressing  Goal: Absence of Hospital-Acquired Illness or Injury  Outcome: Progressing  Goal: Optimal Comfort and Wellbeing  Outcome: Progressing     Problem: Wound  Goal: Absence of Infection Signs and Symptoms  Outcome: Progressing  Goal: Optimal Pain Control and Function  Outcome: Progressing  Goal: Skin Health and Integrity  Outcome: Progressing     Problem: Fall Injury Risk  Goal: Absence of Fall and Fall-Related Injury  Outcome: Progressing     POC reviewed with patient. All questions and concerns addressed. Fall/safety precautions implemented and maintained. Pain management provided. Drain care performed. No acute events noted this shift. Please see flowsheet for full assessment and vitals. Bed locked in lowest position. Side rails up x2. Call bell within reach.

## 2024-12-31 NOTE — PROGRESS NOTES
[facility] ([address])  General Surgery  Progress Note    Subjective:     Interval History:  Ambulating and Urinating.  Tolerating sips of liquids but feels belching.  No flatus.  Pain controlled    Post-Op Info:  Procedure(s) (LRB):  ROBOTIC CHOLECYSTECTOMY, attempted & converted to open (N/A)  CHOLECYSTECTOMY (N/A)   1 Day Post-Op      Medications:  Continuous Infusions:  Scheduled Meds:  PRN Meds:  Current Facility-Administered Medications:     HYDROmorphone, 0.5 mg, Intravenous, Q6H PRN    ondansetron, 8 mg, Intravenous, Q12H PRN    oxyCODONE-acetaminophen, 1 tablet, Oral, Q4H PRN     Objective:     Vital Signs (Most Recent):  Temp: 98.3 °F (36.8 °C) (12/31/24 0741)  Pulse: 85 (12/31/24 0741)  Resp: 16 (12/31/24 0853)  BP: 131/66 (12/31/24 0741)  SpO2: (!) 92 % (12/31/24 0741) Vital Signs (24h Range):  Temp:  [97.2 °F (36.2 °C)-98.3 °F (36.8 °C)] 98.3 °F (36.8 °C)  Pulse:  [78-99] 85  Resp:  [15-18] 16  SpO2:  [92 %-100 %] 92 %  BP: (127-150)/(63-88) 131/66       Intake/Output Summary (Last 24 hours) at 12/31/2024 1003  Last data filed at 12/31/2024 0600  Gross per 24 hour   Intake 2120 ml   Output 1910 ml   Net 210 ml       Physical Exam  General up in chair   Eyes nonicteric sclera   Abdomen tender only at incisions.  Drain with the appropriate minimal serosanguineous fluid and no sign of bile    Significant Labs:  CBC:   Recent Labs   Lab 12/31/24 0434   WBC 10.99   RBC 4.00   HGB 11.3*   HCT 35.4*      MCV 89   MCH 28.3   MCHC 31.9*     CMP:   Recent Labs   Lab 12/31/24 0434   GLU 94   CALCIUM 9.9   ALBUMIN 3.6   PROT 7.3      K 3.6   CO2 21*      BUN 6   CREATININE 0.6   ALKPHOS 68   ALT 31   AST 45*   BILITOT 0.3       Significant Diagnostics:  None    Assessment/Plan:     Active Diagnoses:      Problems Resolved During this Admission:    Diagnosis Date Noted Date Resolved POA    PRINCIPAL PROBLEM:  Chronic calculous cholecystitis [K80.10] 12/30/2024 12/30/2024 Yes      Postop day 1 status post robotic converted to open cholecystectomy for severe inflammation, fibrosis and likely old necrosis.    Doing well considering open procedure and length of procedure.  We will re-evaluate the patient later this afternoon.  If she is tolerating more p.o. we may be able to discharge but more likely discharge tomorrow.    Chirag Acuna MD  General Surgery

## 2024-12-31 NOTE — PLAN OF CARE
VS stable, pain managed throughout shift. No other distress reported. Afebrile this shift. Repositions self independently in bed and ambulating around room. Up in chair, family at bedside. Good UOP. Free from injuries or skin breakdown. Fall precautions maintained. Call light within reach. POC updated, questioned answered and comments acknowledged. Purposeful hourly rounding completed this shift.

## 2025-01-01 PROCEDURE — 25000003 PHARM REV CODE 250: Performed by: SURGERY

## 2025-01-01 PROCEDURE — 94761 N-INVAS EAR/PLS OXIMETRY MLT: CPT

## 2025-01-01 PROCEDURE — 63600175 PHARM REV CODE 636 W HCPCS: Performed by: SURGERY

## 2025-01-01 PROCEDURE — 94799 UNLISTED PULMONARY SVC/PX: CPT

## 2025-01-01 PROCEDURE — 11000001 HC ACUTE MED/SURG PRIVATE ROOM

## 2025-01-01 RX ORDER — ACETAMINOPHEN 325 MG/1
650 TABLET ORAL EVERY 6 HOURS
Status: DISCONTINUED | OUTPATIENT
Start: 2025-01-01 | End: 2025-01-03 | Stop reason: HOSPADM

## 2025-01-01 RX ORDER — KETOROLAC TROMETHAMINE 30 MG/ML
15 INJECTION, SOLUTION INTRAMUSCULAR; INTRAVENOUS EVERY 6 HOURS
Status: COMPLETED | OUTPATIENT
Start: 2025-01-01 | End: 2025-01-03

## 2025-01-01 RX ORDER — OXYCODONE HYDROCHLORIDE 5 MG/1
5 TABLET ORAL EVERY 6 HOURS PRN
Status: DISCONTINUED | OUTPATIENT
Start: 2025-01-01 | End: 2025-01-03 | Stop reason: HOSPADM

## 2025-01-01 RX ORDER — ENOXAPARIN SODIUM 100 MG/ML
40 INJECTION SUBCUTANEOUS EVERY 24 HOURS
Status: DISCONTINUED | OUTPATIENT
Start: 2025-01-01 | End: 2025-01-03 | Stop reason: HOSPADM

## 2025-01-01 RX ORDER — GABAPENTIN 300 MG/1
300 CAPSULE ORAL 3 TIMES DAILY
Status: DISCONTINUED | OUTPATIENT
Start: 2025-01-01 | End: 2025-01-03 | Stop reason: HOSPADM

## 2025-01-01 RX ORDER — METHOCARBAMOL 500 MG/1
500 TABLET, FILM COATED ORAL 4 TIMES DAILY
Status: DISCONTINUED | OUTPATIENT
Start: 2025-01-01 | End: 2025-01-03 | Stop reason: HOSPADM

## 2025-01-01 RX ADMIN — GABAPENTIN 300 MG: 300 CAPSULE ORAL at 11:01

## 2025-01-01 RX ADMIN — METHOCARBAMOL 500 MG: 500 TABLET ORAL at 11:01

## 2025-01-01 RX ADMIN — ACETAMINOPHEN 650 MG: 325 TABLET, FILM COATED ORAL at 05:01

## 2025-01-01 RX ADMIN — ENOXAPARIN SODIUM 40 MG: 40 INJECTION SUBCUTANEOUS at 05:01

## 2025-01-01 RX ADMIN — KETOROLAC TROMETHAMINE 15 MG: 30 INJECTION, SOLUTION INTRAMUSCULAR; INTRAVENOUS at 12:01

## 2025-01-01 RX ADMIN — HYDROMORPHONE HYDROCHLORIDE 0.5 MG: 2 INJECTION INTRAMUSCULAR; INTRAVENOUS; SUBCUTANEOUS at 10:01

## 2025-01-01 RX ADMIN — METHOCARBAMOL 500 MG: 500 TABLET ORAL at 05:01

## 2025-01-01 RX ADMIN — GABAPENTIN 300 MG: 300 CAPSULE ORAL at 02:01

## 2025-01-01 RX ADMIN — GABAPENTIN 300 MG: 300 CAPSULE ORAL at 08:01

## 2025-01-01 RX ADMIN — ACETAMINOPHEN 650 MG: 325 TABLET, FILM COATED ORAL at 12:01

## 2025-01-01 RX ADMIN — KETOROLAC TROMETHAMINE 15 MG: 30 INJECTION, SOLUTION INTRAMUSCULAR; INTRAVENOUS at 05:01

## 2025-01-01 RX ADMIN — HYDROMORPHONE HYDROCHLORIDE 0.5 MG: 2 INJECTION INTRAMUSCULAR; INTRAVENOUS; SUBCUTANEOUS at 12:01

## 2025-01-01 RX ADMIN — METHOCARBAMOL 500 MG: 500 TABLET ORAL at 08:01

## 2025-01-01 RX ADMIN — METHOCARBAMOL 500 MG: 500 TABLET ORAL at 02:01

## 2025-01-01 RX ADMIN — OXYCODONE HYDROCHLORIDE AND ACETAMINOPHEN 1 TABLET: 5; 325 TABLET ORAL at 05:01

## 2025-01-01 NOTE — PLAN OF CARE
Pt lying in bed. Assessment completed; no pain or distress reported. Call light within reach and bed lowered.     1700  VS stable; no pain or distress reported. VSS on RA and afebrile this shift.  Good appetite.  Repositions self independently in bed and ambulating around room. Free from injury or skin breakdown; Fall precautions maintained and call light in reach.  POC updated questions answered and comments acknowledged.  Purposeful hourly rounding completed this shift.

## 2025-01-01 NOTE — PROGRESS NOTES
[facility]  General Surgery  Progress Note    Subjective:     Interval History: No acute changes noted.  Occasional belching still noted.  No flatus or BM.  Tolerating clears without complication.  Currently severe pain    Post-Op Info:  Procedure(s) (LRB):  ROBOTIC CHOLECYSTECTOMY, attempted & converted to open (N/A)  CHOLECYSTECTOMY (N/A)   2 Days Post-Op      Medications:  Continuous Infusions:  Scheduled Meds:    PRN Meds:    Current Facility-Administered Medications:     HYDROmorphone, 0.5 mg, Intravenous, Q6H PRN    ondansetron, 8 mg, Intravenous, Q12H PRN    oxyCODONE-acetaminophen, 1 tablet, Oral, Q4H PRN     Objective:     Vital Signs (Most Recent):  Temp: 98 °F (36.7 °C) (01/01/25 0832)  Pulse: 77 (01/01/25 0832)  Resp: 14 (01/01/25 0832)  BP: (!) 146/78 (01/01/25 0832)  SpO2: 98 % (01/01/25 0832) Vital Signs (24h Range):  Temp:  [97.4 °F (36.3 °C)-98.4 °F (36.9 °C)] 98 °F (36.7 °C)  Pulse:  [77-85] 77  Resp:  [14-20] 14  SpO2:  [94 %-100 %] 98 %  BP: (124-146)/(69-78) 146/78       Intake/Output Summary (Last 24 hours) at 1/1/2025 1012  Last data filed at 1/1/2025 0530  Gross per 24 hour   Intake 560 ml   Output 818 ml   Net -258 ml       Physical Exam  Cardiovascular:      Rate and Rhythm: Normal rate.   Pulmonary:      Effort: Pulmonary effort is normal.   Abdominal:      Comments: Incision with sterile dressing in place.  Appropriate incisional tenderness.    [id number]  Drain with serosanguineous drainage (more serous than sanguinous).         Significant Labs:  CBC:   Recent Labs   Lab 12/31/24  0434   WBC 10.99   RBC 4.00   HGB 11.3*   HCT 35.4*      MCV 89   MCH 28.3   MCHC 31.9*     CMP:   Recent Labs   Lab 12/31/24  0434   GLU 94   CALCIUM 9.9   ALBUMIN 3.6   PROT 7.3      K 3.6   CO2 21*      BUN 6   CREATININE 0.6   ALKPHOS 68   ALT 31   AST 45*   BILITOT 0.3       Significant Diagnostics:  None    Assessment/Plan:     Active Diagnoses:      Problems Resolved  During this Admission:    Diagnosis Date Noted Date Resolved POA    PRINCIPAL PROBLEM:  Chronic calculous cholecystitis [K80.10] 12/30/2024 12/30/2024 Yes       Patient is a female 54 years of age status post robotic converted to open cholecystectomy for chronic cholecystitis on 12/30/2024.  -Will continue clears at this time.  -Will add multimodal pain control.  -Encourage ambulation.  -Aggressive pulmonary toilet.      Elen Goff MD  Staff Surgeon  General Surgery & Endocrine Surgery

## 2025-01-02 LAB
FINAL PATHOLOGIC DIAGNOSIS: NORMAL
GROSS: NORMAL
Lab: NORMAL

## 2025-01-02 PROCEDURE — 11000001 HC ACUTE MED/SURG PRIVATE ROOM

## 2025-01-02 PROCEDURE — 94761 N-INVAS EAR/PLS OXIMETRY MLT: CPT

## 2025-01-02 PROCEDURE — 94799 UNLISTED PULMONARY SVC/PX: CPT

## 2025-01-02 PROCEDURE — 63600175 PHARM REV CODE 636 W HCPCS: Performed by: SURGERY

## 2025-01-02 PROCEDURE — 25000003 PHARM REV CODE 250: Performed by: SURGERY

## 2025-01-02 RX ADMIN — KETOROLAC TROMETHAMINE 15 MG: 30 INJECTION, SOLUTION INTRAMUSCULAR; INTRAVENOUS at 12:01

## 2025-01-02 RX ADMIN — ACETAMINOPHEN 650 MG: 325 TABLET, FILM COATED ORAL at 05:01

## 2025-01-02 RX ADMIN — ACETAMINOPHEN 650 MG: 325 TABLET, FILM COATED ORAL at 12:01

## 2025-01-02 RX ADMIN — METHOCARBAMOL 500 MG: 500 TABLET ORAL at 09:01

## 2025-01-02 RX ADMIN — METHOCARBAMOL 500 MG: 500 TABLET ORAL at 08:01

## 2025-01-02 RX ADMIN — KETOROLAC TROMETHAMINE 15 MG: 30 INJECTION, SOLUTION INTRAMUSCULAR; INTRAVENOUS at 05:01

## 2025-01-02 RX ADMIN — ACETAMINOPHEN 650 MG: 325 TABLET, FILM COATED ORAL at 11:01

## 2025-01-02 RX ADMIN — KETOROLAC TROMETHAMINE 15 MG: 30 INJECTION, SOLUTION INTRAMUSCULAR; INTRAVENOUS at 11:01

## 2025-01-02 RX ADMIN — GABAPENTIN 300 MG: 300 CAPSULE ORAL at 08:01

## 2025-01-02 RX ADMIN — GABAPENTIN 300 MG: 300 CAPSULE ORAL at 03:01

## 2025-01-02 RX ADMIN — ENOXAPARIN SODIUM 40 MG: 40 INJECTION SUBCUTANEOUS at 05:01

## 2025-01-02 RX ADMIN — GABAPENTIN 300 MG: 300 CAPSULE ORAL at 09:01

## 2025-01-02 RX ADMIN — METHOCARBAMOL 500 MG: 500 TABLET ORAL at 05:01

## 2025-01-02 NOTE — PROGRESS NOTES
[facility] ([address])  General Surgery  Progress Note    Subjective:     Interval History:  Ambulating increased . Had flatus.  Pain is still significant but only at incisions.  Tolerating small portions of liquids.    Post-Op Info:  Procedure(s) (LRB):  ROBOTIC CHOLECYSTECTOMY, attempted & converted to open (N/A)  CHOLECYSTECTOMY (N/A)   3 Days Post-Op      Medications:  Continuous Infusions:  Scheduled Meds:   acetaminophen  650 mg Oral Q6H    enoxparin  40 mg Subcutaneous Q24H (prophylaxis, 1700)    gabapentin  300 mg Oral TID    ketorolac  15 mg Intravenous Q6H    methocarbamoL  500 mg Oral QID     PRN Meds:  Current Facility-Administered Medications:     HYDROmorphone, 0.5 mg, Intravenous, Q6H PRN    ondansetron, 8 mg, Intravenous, Q12H PRN    oxyCODONE, 5 mg, Oral, Q6H PRN     Objective:     Vital Signs (Most Recent):  Temp: 97.6 °F (36.4 °C) (01/02/25 1211)  Pulse: 74 (01/02/25 1211)  Resp: 14 (01/02/25 1211)  BP: (!) 164/84 (01/02/25 1211)  SpO2: 99 % (01/02/25 1211) Vital Signs (24h Range):  Temp:  [97.6 °F (36.4 °C)-98.8 °F (37.1 °C)] 97.6 °F (36.4 °C)  Pulse:  [74-79] 74  Resp:  [14-20] 14  SpO2:  [94 %-100 %] 99 %  BP: (120-164)/(70-95) 164/84       Intake/Output Summary (Last 24 hours) at 1/2/2025 1600  Last data filed at 1/2/2025 1302  Gross per 24 hour   Intake 320 ml   Output 2090 ml   Net -1770 ml       Physical Exam  General no apparent distress   Eyes nonicteric sclera   Abdomen soft appropriately tender at incisions.  Drain output serosanguineous, scant, nonbilious    Significant Labs:  None    Significant Diagnostics:  None    Assessment/Plan:     Active Diagnoses:      Problems Resolved During this Admission:    Diagnosis Date Noted Date Resolved POA    PRINCIPAL PROBLEM:  Chronic calculous cholecystitis [K80.10] 12/30/2024 12/30/2024 Yes   I discussed discharge home.  Not tried solid food.  She requests 1 more night for pain control and and to see if she tolerates solid food.   Discharge tomorrow      Chirag Acuna MD  General Surgery

## 2025-01-02 NOTE — PLAN OF CARE
Case Management Assessment     PCP: Jag Lake MD  Pharmacy: Bedside    Patient Arrived From: Home  Existing Help at Home: Sisters    Barriers to Discharge: None    Discharge Plan:    A. Home   B. Home with        [facility] ([address])  Initial Discharge Assessment       Primary Care Provider: Jag Lake MD    Admission Diagnosis: Gallstone [K80.20]  Chronic calculous cholecystitis [K80.10]    Admission Date: 12/30/2024  Expected Discharge Date: 1/3/2025    Transition of Care Barriers: (P) None    Payor: MEDICAID / Plan: [place] [employer] / Product Type: Managed Medicaid /     Extended Emergency Contact Information  Primary Emergency Contact: Crystal Kitchen   [place]  Mobile Phone: [phone]  Relation: Sister    Discharge Plan A: (P) Home, Home with family  Discharge Plan B: (P) Home Health      [id number] [place], [place] - [address]  [address]  [facility] [address]  Phone: [phone] Fax: [phone]      Initial Assessment (most recent)       Adult Discharge Assessment - 01/02/25 1306          Discharge Assessment    Assessment Type Discharge Planning Assessment (P)      Confirmed/corrected address, phone number and insurance Yes (P)      Confirmed Demographics Correct on Facesheet (P)      Source of Information patient;health record (P)      People in Home alone (P)      Do you expect to return to your current living situation? Yes (P)      Do you have help at home or someone to help you manage your care at home? Yes (P)      Prior to hospitilization cognitive status: Alert/Oriented;No Deficits (P)      Current cognitive status: Alert/Oriented;No Deficits (P)      Equipment Currently Used at Home none (P)      Readmission within 30 days? No (P)      Patient currently being followed by outpatient case management? No (P)      Do you currently have service(s) that  help you manage your care at home? No (P)      Do you take prescription medications? Yes (P)      Do you have prescription coverage? Yes (P)      Do you have any problems affording any of your prescribed medications? No (P)      Is the patient taking medications as prescribed? yes (P)      How do you get to doctors appointments? family or friend will provide;car, drives self (P)      Are you on dialysis? No (P)      Do you take coumadin? No (P)      Discharge Plan A Home;Home with family (P)      Discharge Plan B Home Health (P)      Discharge Plan discussed with: Patient (P)      Transition of Care Barriers None (P)

## 2025-01-03 VITALS
OXYGEN SATURATION: 100 % | DIASTOLIC BLOOD PRESSURE: 88 MMHG | RESPIRATION RATE: 20 BRPM | HEIGHT: 62 IN | BODY MASS INDEX: 44.16 KG/M2 | WEIGHT: 240 LBS | TEMPERATURE: 99 F | SYSTOLIC BLOOD PRESSURE: 140 MMHG | HEART RATE: 92 BPM

## 2025-01-03 PROBLEM — K80.00 ACUTE CALCULOUS CHOLECYSTITIS: Status: RESOLVED | Noted: 2025-01-03 | Resolved: 2025-01-03

## 2025-01-03 PROBLEM — K80.00 ACUTE CALCULOUS CHOLECYSTITIS: Status: ACTIVE | Noted: 2025-01-03

## 2025-01-03 PROCEDURE — 63600175 PHARM REV CODE 636 W HCPCS: Performed by: SURGERY

## 2025-01-03 PROCEDURE — 94799 UNLISTED PULMONARY SVC/PX: CPT

## 2025-01-03 PROCEDURE — 94761 N-INVAS EAR/PLS OXIMETRY MLT: CPT

## 2025-01-03 PROCEDURE — 25000003 PHARM REV CODE 250: Performed by: SURGERY

## 2025-01-03 RX ORDER — OXYCODONE AND ACETAMINOPHEN 5; 325 MG/1; MG/1
1 TABLET ORAL EVERY 4 HOURS PRN
Qty: 25 TABLET | Refills: 0 | Status: ON HOLD | OUTPATIENT
Start: 2025-01-03

## 2025-01-03 RX ADMIN — KETOROLAC TROMETHAMINE 15 MG: 30 INJECTION, SOLUTION INTRAMUSCULAR; INTRAVENOUS at 05:01

## 2025-01-03 RX ADMIN — ACETAMINOPHEN 650 MG: 325 TABLET, FILM COATED ORAL at 05:01

## 2025-01-03 RX ADMIN — METHOCARBAMOL 500 MG: 500 TABLET ORAL at 09:01

## 2025-01-03 RX ADMIN — GABAPENTIN 300 MG: 300 CAPSULE ORAL at 09:01

## 2025-01-03 RX ADMIN — ONDANSETRON 8 MG: 2 INJECTION INTRAMUSCULAR; INTRAVENOUS at 11:01

## 2025-01-03 NOTE — PLAN OF CARE
VS stable, pain managed. No other distress reported. Afebrile this shift. Good appetite. Up in chair and ambulating independently to bathroom. Good UOP. Free from injuries or skin breakdown. Fall precautions maintained. Call light within reach. POC updated, questioned answered and comments acknowledged. MD ordered to discharge pt.

## 2025-01-03 NOTE — PLAN OF CARE
VS stable. No pain or distress reported. Afebrile this shift. Good appetite. Repositions self independently in bed and ambulating around room. Good UOP. Free from injuries or skin breakdown. Fall precautions maintained. Call light within reach. POC updated, questioned answered and comments acknowledged. Discharge orders in.

## 2025-01-03 NOTE — PLAN OF CARE
VS stable, pain managed throughout shift. No other distress reported. Afebrile this shift. Good appetite. Repositions self independently in bed and ambulating around room. Good UOP. Free from injuries or skin breakdown. Fall precautions maintained. Call light within reach. POC updated, questioned answered and comments acknowledged. Purposeful hourly rounding completed this shift.

## 2025-01-06 NOTE — DISCHARGE SUMMARY
[facility] ([address])  Discharge Note  Short Stay converted to inpatient    Procedure(s) (LRB):  ROBOTIC CHOLECYSTECTOMY, attempted & converted to open (N/A)  CHOLECYSTECTOMY (N/A) 12 /30/ 24      OUTCOME: Patient tolerated treatment/procedure well without complication and is now ready for discharge.  Patient admitted for elective robotic cholecystectomy on 12/30/24.  Procedure was robotic attempted then conversion to open cholecystectomy due to severe inflammation.  Intraoperative drain was placed.  She was maintained in the hospital for 3 days mainly for pain control and return of bowel function.  By discharge date she had stable vitals and was afebrile.  She was tolerating a regular diet.  She was having flatus.  She had no further concerns and expressed interest in going home and was discharged    DISPOSITION: Home or Self Care    FINAL DIAGNOSIS:  Chronic calculous cholecystitis    FOLLOWUP: In clinic 4-5 days    DISCHARGE INSTRUCTIONS:    Discharge Procedure Orders   Diet general     Lifting restrictions   Order Comments: No strenuous activity or No lifting greater than 15 lb for 6 weeks     No dressing needed   Order Comments: Shower only     Call MD for:  temperature >100.4     Call MD for:  persistent nausea and vomiting     Call MD for:  severe uncontrolled pain     Call MD for:  difficulty breathing, headache or visual disturbances     Call MD for:  redness, tenderness, or signs of infection (pain, swelling, redness, odor or green/yellow discharge around incision site)        TIME SPENT ON DISCHARGE:  10 minutes

## 2025-01-07 ENCOUNTER — ANESTHESIA (OUTPATIENT)
Dept: SURGERY | Facility: OTHER | Age: 55
End: 2025-01-07
Payer: MEDICAID

## 2025-01-07 ENCOUNTER — ANESTHESIA EVENT (OUTPATIENT)
Dept: SURGERY | Facility: OTHER | Age: 55
End: 2025-01-07
Payer: MEDICAID

## 2025-01-07 ENCOUNTER — HOSPITAL ENCOUNTER (INPATIENT)
Facility: OTHER | Age: 55
LOS: 6 days | Discharge: HOME | DRG: 354 | End: 2025-01-13
Attending: EMERGENCY MEDICINE | Admitting: EMERGENCY MEDICINE
Payer: MEDICAID

## 2025-01-07 DIAGNOSIS — K56.609 SBO (SMALL BOWEL OBSTRUCTION): Primary | ICD-10-CM

## 2025-01-07 LAB
ALBUMIN SERPL BCP-MCNC: 3.9 G/DL (ref 3.5–5.2)
ALP SERPL-CCNC: 75 U/L (ref 40–150)
ALT SERPL W/O P-5'-P-CCNC: 50 U/L (ref 10–44)
AMORPH CRY URNS QL MICRO: NORMAL
ANION GAP SERPL CALC-SCNC: 21 MMOL/L (ref 8–16)
AST SERPL-CCNC: 35 U/L (ref 10–40)
BACTERIA #/AREA URNS HPF: NORMAL /HPF
BASOPHILS # BLD AUTO: 0.02 K/UL (ref 0–0.2)
BASOPHILS NFR BLD: 0.1 % (ref 0–1.9)
BILIRUB SERPL-MCNC: 0.6 MG/DL (ref 0.1–1)
BILIRUB UR QL STRIP: ABNORMAL
BUN SERPL-MCNC: 12 MG/DL (ref 6–20)
CALCIUM SERPL-MCNC: 10.4 MG/DL (ref 8.7–10.5)
CHLORIDE SERPL-SCNC: 92 MMOL/L (ref 95–110)
CLARITY UR: CLEAR
CO2 SERPL-SCNC: 26 MMOL/L (ref 23–29)
COLOR UR: YELLOW
CREAT SERPL-MCNC: 0.7 MG/DL (ref 0.5–1.4)
DIFFERENTIAL METHOD BLD: ABNORMAL
EOSINOPHIL # BLD AUTO: 0 K/UL (ref 0–0.5)
EOSINOPHIL NFR BLD: 0.1 % (ref 0–8)
ERYTHROCYTE [DISTWIDTH] IN BLOOD BY AUTOMATED COUNT: 12.3 % (ref 11.5–14.5)
EST. GFR  (NO RACE VARIABLE): >60 ML/MIN/1.73 M^2
GLUCOSE SERPL-MCNC: 121 MG/DL (ref 70–110)
GLUCOSE UR QL STRIP: NEGATIVE
HCT VFR BLD AUTO: 42.6 % (ref 37–48.5)
HCV AB SERPL QL IA: NEGATIVE
HGB BLD-MCNC: 13.8 G/DL (ref 12–16)
HGB UR QL STRIP: NEGATIVE
HIV 1+2 AB+HIV1 P24 AG SERPL QL IA: NEGATIVE
HYALINE CASTS #/AREA URNS LPF: 0 /LPF
IMM GRANULOCYTES # BLD AUTO: 0.11 K/UL (ref 0–0.04)
IMM GRANULOCYTES NFR BLD AUTO: 0.8 % (ref 0–0.5)
KETONES UR QL STRIP: ABNORMAL
LEUKOCYTE ESTERASE UR QL STRIP: NEGATIVE
LIPASE SERPL-CCNC: 15 U/L (ref 4–60)
LYMPHOCYTES # BLD AUTO: 2 K/UL (ref 1–4.8)
LYMPHOCYTES NFR BLD: 14.3 % (ref 18–48)
MAGNESIUM SERPL-MCNC: 1.9 MG/DL (ref 1.6–2.6)
MCH RBC QN AUTO: 27.8 PG (ref 27–31)
MCHC RBC AUTO-ENTMCNC: 32.4 G/DL (ref 32–36)
MCV RBC AUTO: 86 FL (ref 82–98)
MICROSCOPIC COMMENT: NORMAL
MONOCYTES # BLD AUTO: 1 K/UL (ref 0.3–1)
MONOCYTES NFR BLD: 7.5 % (ref 4–15)
NEUTROPHILS # BLD AUTO: 10.7 K/UL (ref 1.8–7.7)
NEUTROPHILS NFR BLD: 77.2 % (ref 38–73)
NITRITE UR QL STRIP: NEGATIVE
NRBC BLD-RTO: 0 /100 WBC
PH UR STRIP: 6 [PH] (ref 5–8)
PLATELET # BLD AUTO: 682 K/UL (ref 150–450)
PMV BLD AUTO: 8.6 FL (ref 9.2–12.9)
POTASSIUM SERPL-SCNC: 3.9 MMOL/L (ref 3.5–5.1)
PROT SERPL-MCNC: 8.1 G/DL (ref 6–8.4)
PROT UR QL STRIP: ABNORMAL
RBC # BLD AUTO: 4.96 M/UL (ref 4–5.4)
RBC #/AREA URNS HPF: 1 /HPF (ref 0–4)
SODIUM SERPL-SCNC: 139 MMOL/L (ref 136–145)
SP GR UR STRIP: >1.03 (ref 1–1.03)
SQUAMOUS #/AREA URNS HPF: 3 /HPF
URN SPEC COLLECT METH UR: ABNORMAL
UROBILINOGEN UR STRIP-ACNC: ABNORMAL EU/DL
WBC # BLD AUTO: 13.91 K/UL (ref 3.9–12.7)
WBC #/AREA URNS HPF: 2 /HPF (ref 0–5)

## 2025-01-07 PROCEDURE — 86803 HEPATITIS C AB TEST: CPT | Performed by: EMERGENCY MEDICINE

## 2025-01-07 PROCEDURE — 37000008 HC ANESTHESIA 1ST 15 MINUTES: Performed by: SURGERY

## 2025-01-07 PROCEDURE — 87389 HIV-1 AG W/HIV-1&-2 AB AG IA: CPT | Performed by: EMERGENCY MEDICINE

## 2025-01-07 PROCEDURE — 85025 COMPLETE CBC W/AUTO DIFF WBC: CPT | Performed by: NURSE PRACTITIONER

## 2025-01-07 PROCEDURE — 63600175 PHARM REV CODE 636 W HCPCS: Performed by: SURGERY

## 2025-01-07 PROCEDURE — 0WQF0ZZ REPAIR ABDOMINAL WALL, OPEN APPROACH: ICD-10-PCS | Performed by: SURGERY

## 2025-01-07 PROCEDURE — 37000009 HC ANESTHESIA EA ADD 15 MINS: Performed by: SURGERY

## 2025-01-07 PROCEDURE — 63600175 PHARM REV CODE 636 W HCPCS: Performed by: EMERGENCY MEDICINE

## 2025-01-07 PROCEDURE — 96361 HYDRATE IV INFUSION ADD-ON: CPT

## 2025-01-07 PROCEDURE — 25500020 PHARM REV CODE 255: Performed by: EMERGENCY MEDICINE

## 2025-01-07 PROCEDURE — 71000039 HC RECOVERY, EACH ADD'L HOUR: Performed by: SURGERY

## 2025-01-07 PROCEDURE — 83735 ASSAY OF MAGNESIUM: CPT | Performed by: NURSE PRACTITIONER

## 2025-01-07 PROCEDURE — 83690 ASSAY OF LIPASE: CPT | Performed by: NURSE PRACTITIONER

## 2025-01-07 PROCEDURE — 96365 THER/PROPH/DIAG IV INF INIT: CPT

## 2025-01-07 PROCEDURE — 25000003 PHARM REV CODE 250: Performed by: STUDENT IN AN ORGANIZED HEALTH CARE EDUCATION/TRAINING PROGRAM

## 2025-01-07 PROCEDURE — 71000033 HC RECOVERY, INTIAL HOUR: Performed by: SURGERY

## 2025-01-07 PROCEDURE — 99285 EMERGENCY DEPT VISIT HI MDM: CPT | Mod: 25

## 2025-01-07 PROCEDURE — 81000 URINALYSIS NONAUTO W/SCOPE: CPT | Performed by: NURSE PRACTITIONER

## 2025-01-07 PROCEDURE — 25000003 PHARM REV CODE 250: Performed by: EMERGENCY MEDICINE

## 2025-01-07 PROCEDURE — 36000706: Performed by: SURGERY

## 2025-01-07 PROCEDURE — 25000003 PHARM REV CODE 250: Performed by: NURSE PRACTITIONER

## 2025-01-07 PROCEDURE — 63600175 PHARM REV CODE 636 W HCPCS: Performed by: NURSE PRACTITIONER

## 2025-01-07 PROCEDURE — 99223 1ST HOSP IP/OBS HIGH 75: CPT | Mod: 25,,, | Performed by: SURGERY

## 2025-01-07 PROCEDURE — 80053 COMPREHEN METABOLIC PANEL: CPT | Performed by: NURSE PRACTITIONER

## 2025-01-07 PROCEDURE — 96375 TX/PRO/DX INJ NEW DRUG ADDON: CPT

## 2025-01-07 PROCEDURE — 36000707: Performed by: SURGERY

## 2025-01-07 PROCEDURE — 63600175 PHARM REV CODE 636 W HCPCS: Mod: TB | Performed by: ANESTHESIOLOGY

## 2025-01-07 PROCEDURE — 21400001 HC TELEMETRY ROOM

## 2025-01-07 PROCEDURE — 49591 RPR AA HRN 1ST < 3 CM RDC: CPT | Mod: ,,, | Performed by: SURGERY

## 2025-01-07 PROCEDURE — 63600175 PHARM REV CODE 636 W HCPCS: Performed by: STUDENT IN AN ORGANIZED HEALTH CARE EDUCATION/TRAINING PROGRAM

## 2025-01-07 PROCEDURE — P9045 ALBUMIN (HUMAN), 5%, 250 ML: HCPCS | Mod: JZ,TB | Performed by: STUDENT IN AN ORGANIZED HEALTH CARE EDUCATION/TRAINING PROGRAM

## 2025-01-07 RX ORDER — ROCURONIUM BROMIDE 10 MG/ML
INJECTION, SOLUTION INTRAVENOUS
Status: DISCONTINUED | OUTPATIENT
Start: 2025-01-07 | End: 2025-01-07

## 2025-01-07 RX ORDER — ONDANSETRON HYDROCHLORIDE 2 MG/ML
INJECTION, SOLUTION INTRAVENOUS
Status: DISCONTINUED | OUTPATIENT
Start: 2025-01-07 | End: 2025-01-07

## 2025-01-07 RX ORDER — PROCHLORPERAZINE EDISYLATE 5 MG/ML
5 INJECTION INTRAMUSCULAR; INTRAVENOUS EVERY 30 MIN PRN
Status: DISCONTINUED | OUTPATIENT
Start: 2025-01-07 | End: 2025-01-07 | Stop reason: HOSPADM

## 2025-01-07 RX ORDER — ONDANSETRON HYDROCHLORIDE 2 MG/ML
4 INJECTION, SOLUTION INTRAVENOUS
Status: COMPLETED | OUTPATIENT
Start: 2025-01-07 | End: 2025-01-07

## 2025-01-07 RX ORDER — SUCCINYLCHOLINE CHLORIDE 20 MG/ML
INJECTION INTRAMUSCULAR; INTRAVENOUS
Status: DISCONTINUED | OUTPATIENT
Start: 2025-01-07 | End: 2025-01-07

## 2025-01-07 RX ORDER — HYDROMORPHONE HYDROCHLORIDE 2 MG/ML
0.4 INJECTION, SOLUTION INTRAMUSCULAR; INTRAVENOUS; SUBCUTANEOUS EVERY 5 MIN PRN
Status: DISCONTINUED | OUTPATIENT
Start: 2025-01-07 | End: 2025-01-07 | Stop reason: HOSPADM

## 2025-01-07 RX ORDER — CEFAZOLIN SODIUM 1 G/3ML
INJECTION, POWDER, FOR SOLUTION INTRAMUSCULAR; INTRAVENOUS
Status: DISCONTINUED | OUTPATIENT
Start: 2025-01-07 | End: 2025-01-07

## 2025-01-07 RX ORDER — ALBUMIN HUMAN 50 G/1000ML
SOLUTION INTRAVENOUS
Status: DISCONTINUED | OUTPATIENT
Start: 2025-01-07 | End: 2025-01-07

## 2025-01-07 RX ORDER — FENTANYL CITRATE 50 UG/ML
INJECTION, SOLUTION INTRAMUSCULAR; INTRAVENOUS
Status: DISCONTINUED | OUTPATIENT
Start: 2025-01-07 | End: 2025-01-07

## 2025-01-07 RX ORDER — GLUCAGON 1 MG
1 KIT INJECTION
Status: DISCONTINUED | OUTPATIENT
Start: 2025-01-07 | End: 2025-01-07 | Stop reason: HOSPADM

## 2025-01-07 RX ORDER — LIDOCAINE HYDROCHLORIDE AND EPINEPHRINE 10; 10 UG/ML; MG/ML
INJECTION, SOLUTION INFILTRATION; PERINEURAL
Status: DISCONTINUED | OUTPATIENT
Start: 2025-01-07 | End: 2025-01-07 | Stop reason: HOSPADM

## 2025-01-07 RX ORDER — BUPIVACAINE HYDROCHLORIDE 2.5 MG/ML
INJECTION, SOLUTION EPIDURAL; INFILTRATION; INTRACAUDAL
Status: DISCONTINUED | OUTPATIENT
Start: 2025-01-07 | End: 2025-01-07 | Stop reason: HOSPADM

## 2025-01-07 RX ORDER — SODIUM CHLORIDE, SODIUM LACTATE, POTASSIUM CHLORIDE, CALCIUM CHLORIDE 600; 310; 30; 20 MG/100ML; MG/100ML; MG/100ML; MG/100ML
INJECTION, SOLUTION INTRAVENOUS CONTINUOUS
Status: DISCONTINUED | OUTPATIENT
Start: 2025-01-07 | End: 2025-01-08

## 2025-01-07 RX ORDER — LIDOCAINE HYDROCHLORIDE 20 MG/ML
INJECTION INTRAVENOUS
Status: DISCONTINUED | OUTPATIENT
Start: 2025-01-07 | End: 2025-01-07

## 2025-01-07 RX ORDER — PROPOFOL 10 MG/ML
VIAL (ML) INTRAVENOUS
Status: DISCONTINUED | OUTPATIENT
Start: 2025-01-07 | End: 2025-01-07

## 2025-01-07 RX ORDER — DEXAMETHASONE SODIUM PHOSPHATE 4 MG/ML
INJECTION, SOLUTION INTRA-ARTICULAR; INTRALESIONAL; INTRAMUSCULAR; INTRAVENOUS; SOFT TISSUE
Status: DISCONTINUED | OUTPATIENT
Start: 2025-01-07 | End: 2025-01-07

## 2025-01-07 RX ORDER — SODIUM CHLORIDE 0.9 % (FLUSH) 0.9 %
3 SYRINGE (ML) INJECTION
Status: DISCONTINUED | OUTPATIENT
Start: 2025-01-07 | End: 2025-01-07 | Stop reason: HOSPADM

## 2025-01-07 RX ORDER — DIPHENHYDRAMINE HYDROCHLORIDE 50 MG/ML
12.5 INJECTION INTRAMUSCULAR; INTRAVENOUS EVERY 30 MIN PRN
Status: DISCONTINUED | OUTPATIENT
Start: 2025-01-07 | End: 2025-01-07 | Stop reason: HOSPADM

## 2025-01-07 RX ORDER — OXYCODONE HYDROCHLORIDE 5 MG/1
5 TABLET ORAL EVERY 4 HOURS PRN
Status: DISCONTINUED | OUTPATIENT
Start: 2025-01-07 | End: 2025-01-07 | Stop reason: HOSPADM

## 2025-01-07 RX ADMIN — SODIUM CHLORIDE 1000 ML: 9 INJECTION, SOLUTION INTRAVENOUS at 02:01

## 2025-01-07 RX ADMIN — SUCCINYLCHOLINE CHLORIDE 200 MG: 20 INJECTION, SOLUTION INTRAMUSCULAR; INTRAVENOUS at 09:01

## 2025-01-07 RX ADMIN — DEXAMETHASONE SODIUM PHOSPHATE 4 MG: 4 INJECTION, SOLUTION INTRAMUSCULAR; INTRAVENOUS at 09:01

## 2025-01-07 RX ADMIN — FENTANYL CITRATE 100 MCG: 50 INJECTION, SOLUTION INTRAMUSCULAR; INTRAVENOUS at 09:01

## 2025-01-07 RX ADMIN — SODIUM CHLORIDE 12.5 MG: 9 INJECTION, SOLUTION INTRAVENOUS at 05:01

## 2025-01-07 RX ADMIN — ROCURONIUM BROMIDE 30 MG: 10 SOLUTION INTRAVENOUS at 09:01

## 2025-01-07 RX ADMIN — HYDROMORPHONE HYDROCHLORIDE 0.4 MG: 2 INJECTION INTRAMUSCULAR; INTRAVENOUS; SUBCUTANEOUS at 10:01

## 2025-01-07 RX ADMIN — ONDANSETRON HYDROCHLORIDE 4 MG: 2 INJECTION INTRAMUSCULAR; INTRAVENOUS at 08:01

## 2025-01-07 RX ADMIN — PROCHLORPERAZINE EDISYLATE 5 MG: 5 INJECTION INTRAMUSCULAR; INTRAVENOUS at 10:01

## 2025-01-07 RX ADMIN — PROPOFOL 200 MG: 10 INJECTION, EMULSION INTRAVENOUS at 09:01

## 2025-01-07 RX ADMIN — SUGAMMADEX 400 MG: 100 INJECTION, SOLUTION INTRAVENOUS at 10:01

## 2025-01-07 RX ADMIN — SODIUM CHLORIDE, POTASSIUM CHLORIDE, SODIUM LACTATE AND CALCIUM CHLORIDE: 600; 310; 30; 20 INJECTION, SOLUTION INTRAVENOUS at 08:01

## 2025-01-07 RX ADMIN — CEFAZOLIN 2 G: 330 INJECTION, POWDER, FOR SOLUTION INTRAMUSCULAR; INTRAVENOUS at 09:01

## 2025-01-07 RX ADMIN — HYDROMORPHONE HYDROCHLORIDE 0.4 MG: 2 INJECTION INTRAMUSCULAR; INTRAVENOUS; SUBCUTANEOUS at 11:01

## 2025-01-07 RX ADMIN — SODIUM CHLORIDE, SODIUM LACTATE, POTASSIUM CHLORIDE, AND CALCIUM CHLORIDE: .6; .31; .03; .02 INJECTION, SOLUTION INTRAVENOUS at 08:01

## 2025-01-07 RX ADMIN — ALBUMIN (HUMAN) 500 ML: 12.5 SOLUTION INTRAVENOUS at 09:01

## 2025-01-07 RX ADMIN — ROCURONIUM BROMIDE 10 MG: 10 SOLUTION INTRAVENOUS at 09:01

## 2025-01-07 RX ADMIN — LIDOCAINE HYDROCHLORIDE 40 MG: 20 INJECTION, SOLUTION INTRAVENOUS at 09:01

## 2025-01-07 RX ADMIN — ONDANSETRON 4 MG: 2 INJECTION INTRAMUSCULAR; INTRAVENOUS at 02:01

## 2025-01-07 RX ADMIN — IOHEXOL 100 ML: 350 INJECTION, SOLUTION INTRAVENOUS at 05:01

## 2025-01-07 NOTE — FIRST PROVIDER EVALUATION
Emergency Department TeleTriage Encounter Note      CHIEF COMPLAINT    Chief Complaint   Patient presents with    Nausea     Had gallbladder removed 12/20 and started vomiting Friday after she was discharged home. Has not been able to hold anything down since.        VITAL SIGNS   Initial Vitals [01/07/25 1301]   BP Pulse Resp Temp SpO2   (!) 179/92 79 18 98.1 °F (36.7 °C) 99 %      MAP       --            ALLERGIES    Review of patient's allergies indicates:   Allergen Reactions    Shellfish containing products Anaphylaxis       PROVIDER TRIAGE NOTE  This is a teletriage evaluation of a 54 y.o. female presenting to the ED complaining of abd pain and vomiting since Friday. Recent cholecystectomy. Denies fever and hematemesis.    Alert, sitting upright.     Initial orders will be placed and care will be transferred to an alternate provider when patient is roomed for a full evaluation. Any additional orders and the final disposition will be determined by that provider.         ORDERS  Labs Reviewed   HEPATITIS C ANTIBODY   HIV 1 / 2 ANTIBODY       ED Orders (720h ago, onward)      Start Ordered     Status Ordering Provider    01/07/25 1330 01/07/25 1328  sodium chloride 0.9% bolus 1,000 mL 1,000 mL  Once         Ordered LISA JAMES N.    01/07/25 1330 01/07/25 1328  ondansetron injection 4 mg  ED 1 Time         Ordered LISA JAMES N.    01/07/25 1329 01/07/25 1328  Vital signs  Every 2 hours         Ordered LISA JAMES N.    01/07/25 1329 01/07/25 1328  Diet NPO  Diet effective now         Ordered LISA JAMES N.    01/07/25 1329 01/07/25 1328  Insert peripheral IV  Once         Ordered LISA JAMES N.    01/07/25 1329 01/07/25 1328  CBC W/ AUTO DIFFERENTIAL  STAT         Ordered LISA JAMES N.    01/07/25 1329 01/07/25 1328  Comp. Metabolic Panel  STAT         Ordered LISA JAMES N.    01/07/25 1329 01/07/25 1328  Lipase  STAT         Ordered  LISA JAMES N.    01/07/25 1329 01/07/25 1328  Urinalysis, Reflex to Urine Culture Urine, Clean Catch  STAT         Ordered LISA JAMES N.    01/07/25 1304 01/07/25 1303  Hepatitis C Antibody  STAT         Ordered MARLEN XUAN SHARI    01/07/25 1304 01/07/25 1303  HIV 1/2 Ag/Ab (4th Gen)  STAT         Ordered XUAN GEE              Virtual Visit Note: The provider triage portion of this emergency department evaluation and documentation was performed via [employer], a HIPAA-compliant telemedicine application, in concert with a tele-presenter in the room. A face to face patient evaluation with one of my colleagues will occur once the patient is placed in an emergency department room.      DISCLAIMER: This note was prepared with [employer] voice recognition transcription software. Garbled syntax, mangled pronouns, and other bizarre constructions may be attributed to that software system.

## 2025-01-07 NOTE — ED TRIAGE NOTES
S/p open cholecystectomy on 12/20/24. States since discharge on 12/27, she has had persistent N/V with intermittent abdominal pain. Denies fever. States unable to keep anything down. Passing flatus but no BM since surgery. No urinary symptoms. No other abdominal surgeries. Presents awake, alert.

## 2025-01-07 NOTE — ED PROVIDER NOTES
Encounter Date: 1/7/2025       History     Chief Complaint   Patient presents with    Nausea     Had gallbladder removed 12/20 and started vomiting Friday after she was discharged home. Has not been able to hold anything down since.      Seen by physician at 2:22PM:    Patient is a 54-year-old female who presents to the emergency department with abdominal pain and nausea/vomiting.  Patient is 8 days postop from a laparoscopic turned open cholecystectomy.  She was discharged on day 4 with no complications.  However since she has been home, she has been persistently having nausea/vomiting and unable to tolerate p.o..  She has been passing gas but denies any bowel movements.  She states that the pain has been manageable.  She denies any fevers.  Patient denies any urinary symptoms patient denies any chest pain or shortness breath.  She has tried ODT Zofran with no improvement.  She also has a SAMANTHA drain which has had a small amount of serosanguineous output daily with no changes in color or consistency.        Review of patient's allergies indicates:   Allergen Reactions    Shellfish containing products Anaphylaxis     Past Medical History:   Diagnosis Date    Fibroids 1995     Past Surgical History:   Procedure Laterality Date    CHOLECYSTECTOMY N/A 12/30/2024    Procedure: CHOLECYSTECTOMY;  Surgeon: Chirag Acuna MD;  Location: [facility];  Service: General;  Laterality: N/A;    ROBOT-ASSISTED CHOLECYSTECTOMY N/A 12/30/2024    Procedure: ROBOTIC CHOLECYSTECTOMY, attempted & converted to open;  Surgeon: Chirag Acuna MD;  Location: [facility] OR;  Service: General;  Laterality: N/A;    UTERINE FIBROID SURGERY       Family History   Problem Relation Name Age of Onset    Diabetes Father      Breast cancer Neg Hx      Ovarian cancer Neg Hx       Social History     Tobacco Use    Smoking status: Never    Smokeless tobacco: Never   Substance Use Topics    Alcohol use: Yes     Alcohol/week: 0.0 standard drinks of alcohol    Drug  use: No     Review of Systems   Constitutional:  Negative for chills and fever.   HENT:  Negative for congestion and rhinorrhea.    Respiratory:  Negative for chest tightness and shortness of breath.    Cardiovascular:  Negative for chest pain and palpitations.   Gastrointestinal:  Positive for abdominal pain, nausea and vomiting. Negative for diarrhea.   Genitourinary:  Negative for dysuria and flank pain.   Musculoskeletal:  Negative for back pain and neck pain.   Skin:  Negative for color change and wound.   Neurological:  Negative for dizziness and headaches.       Physical Exam     Initial Vitals [01/07/25 1301]   BP Pulse Resp Temp SpO2   (!) 179/92 79 18 98.1 °F (36.7 °C) 99 %      MAP       --         Physical Exam    Nursing note and vitals reviewed.  Constitutional: She appears well-developed and well-nourished.   HENT:   Head: Normocephalic and atraumatic.   Dry mucous membranes   Eyes: Conjunctivae are normal.   Neck: Neck supple.   Normal range of motion.  Cardiovascular:  Normal rate, regular rhythm and normal heart sounds.           Pulmonary/Chest: Breath sounds normal. No respiratory distress. She has no wheezes. She has no rales.   Abdominal: Abdomen is soft. Bowel sounds are normal. She exhibits no distension. There is no abdominal tenderness.   Right upper quadrant incision with staples in place, clean/dry/intact.  SAMANTHA drain in the right upper quadrant with serosanguineous fluid.  Mild diffuse tenderness but no peritonitis or rebound or guarding. There is no rebound.   Musculoskeletal:         General: Normal range of motion.      Cervical back: Normal range of motion and neck supple.     Neurological: She is alert and oriented to person, place, and time.   Skin: Skin is warm and dry. Capillary refill takes less than 2 seconds.         ED Course   Procedures  Labs Reviewed   CBC W/ AUTO DIFFERENTIAL - Abnormal       Result Value    WBC 13.91 (*)     RBC 4.96      Hemoglobin 13.8      Hematocrit  42.6      MCV 86      MCH 27.8      MCHC 32.4      RDW 12.3      Platelets 682 (*)     MPV 8.6 (*)     Immature Granulocytes 0.8 (*)     Gran # (ANC) 10.7 (*)     Immature Grans (Abs) 0.11 (*)     Lymph # 2.0      Mono # 1.0      Eos # 0.0      Baso # 0.02      nRBC 0      Gran % 77.2 (*)     Lymph % 14.3 (*)     Mono % 7.5      Eosinophil % 0.1      Basophil % 0.1      Differential Method Automated     COMPREHENSIVE METABOLIC PANEL - Abnormal    Sodium 139      Potassium 3.9      Chloride 92 (*)     CO2 26      Glucose 121 (*)     BUN 12      Creatinine 0.7      Calcium 10.4      Total Protein 8.1      Albumin 3.9      Total Bilirubin 0.6      Alkaline Phosphatase 75      AST 35      ALT 50 (*)     eGFR >60      Anion Gap 21 (*)    URINALYSIS, REFLEX TO URINE CULTURE - Abnormal    Specimen UA Urine, Clean Catch      Color, UA Yellow      Appearance, UA Clear      pH, UA 6.0      Specific Gravity, UA >1.030 (*)     Protein, UA 1+ (*)     Glucose, UA Negative      Ketones, UA 3+ (*)     Bilirubin (UA) 1+ (*)     Occult Blood UA Negative      Nitrite, UA Negative      Urobilinogen, UA 2.0-3.0 (*)     Leukocytes, UA Negative      Narrative:     Specimen Source->Urine   HEPATITIS C ANTIBODY    Hepatitis C Ab Negative      Narrative:     Release to patient->Immediate   HIV 1 / 2 ANTIBODY    HIV 1/2 Ag/Ab Negative      Narrative:     Release to patient->Immediate   LIPASE    Lipase 15     URINALYSIS MICROSCOPIC    RBC, UA 1      WBC, UA 2      Bacteria Occasional      Squam Epithel, UA 3      Hyaline Casts, UA 0      Amorphous, UA Rare      Microscopic Comment SEE COMMENT      Narrative:     Specimen Source->Urine   MAGNESIUM   MAGNESIUM    Magnesium 1.9            Imaging Results              CT Abdomen Pelvis With IV Contrast NO Oral Contrast (Final result)  Result time 01/07/25 18:07:45      Final result by Stacia Hector MD (01/07/25 18:07:45)                   Impression:      1. Small-bowel obstruction secondary  to small periumbilical small bowel containing hernia.  2. Postsurgical changes of reported recent cholecystectomy.  Small multilocular collection seen in the gallbladder fossa could represent possible complex seroma or developing abscess in the right clinical setting.  Right-sided surgical drain is in place which courses near but does not terminate within this specific region.  3. Enlarged multi fibroid uterus.  4. Postsurgical changes and additional findings as detailed above.      Electronically signed by: Stacia Hector MD  Date:    01/07/2025  Time:    18:07               Narrative:    EXAMINATION:  CT ABDOMEN PELVIS WITH IV CONTRAST    CLINICAL HISTORY:  Abdominal pain, post-op;    TECHNIQUE:  Low dose axial images, sagittal and coronal reformations were obtained from the lung bases to the pubic symphysis following the IV administration of 100 mL of Omnipaque 350 .  Oral contrast was not given.    COMPARISON:  None.    FINDINGS:  The visualized portion of the heart is unremarkable.  The lung bases are clear.    No significant hepatic abnormalities are identified.  There is no intra-or extrahepatic biliary ductal dilatation.  Gallbladder has reportedly been removed.  There is multilocular small collection in the gallbladder fossa measuring 3.5 x 2.0 cm in maximum dimensions.  Right-sided surgical drain is seen.  Postsurgical changes with subcutaneous soft tissue air, small amount disorganized fluid, and skin staples are seen involving the right upper quadrant abdominal wall.  The stomach, pancreas, spleen, and adrenal glands are unremarkable.    Kidneys enhance normally with no evidence of hydronephrosis.  No abnormalities are seen along the ureteral courses, noting difficult to track distally.  Urinary bladder is nondistended.  Uterus is enlarged with prominent lobular contour presumably reflecting multiple underlying fibroids, the largest of which likely measures upwards of 8 cm in size.    There is a small  periumbilical hernia containing short loop of small bowel resulting in obstruction.  Proximal small bowel loops measure upwards of 3.5-4 cm in caliber proximally with air-fluid level seen.  Appendix is visualized and is unremarkable.  Colonic diverticulosis is seen more pronounced within the distal descending colon and within the sigmoid colon.  Trace dependent free fluid is seen.  No free air.    Aorta tapers normally.    No acute osseous abnormality identified.                                       Medications   promethazine (PHENERGAN) 12.5 mg in 0.9% NaCl 50 mL IVPB (has no administration in time range)   lactated ringers infusion (has no administration in time range)   sodium chloride 0.9% bolus 1,000 mL 1,000 mL (0 mLs Intravenous Stopped 1/7/25 1515)   ondansetron injection 4 mg (4 mg Intravenous Given 1/7/25 1415)   promethazine (PHENERGAN) 12.5 mg in 0.9% NaCl 50 mL IVPB (0 mg Intravenous Stopped 1/7/25 1722)   iohexoL (OMNIPAQUE 350) injection 100 mL (100 mLs Intravenous Given 1/7/25 1724)     Medical Decision Making  2:22PM:  Patient is a 54-year-old female who presents to the emergency department with right upper quadrant pain, postsurgical, along with nausea/vomiting.  Patient appears well, nontoxic.  Her incision site looks clean/dry/intact with SAMANTHA drain with expected output.  Her pain has been tolerable but she has been having persistent nausea/vomiting.  Will plan for IV fluids, antiemetics, labs, will continue to follow and reassess.    Amount and/or Complexity of Data Reviewed  External Data Reviewed: notes.  Labs: ordered. Decision-making details documented in ED Course.  Radiology: ordered and independent interpretation performed. Decision-making details documented in ED Course.    Risk  Prescription drug management.  Decision regarding hospitalization.    6:45 PM:  Patient's CT is concerning for a small-bowel obstruction.  I discussed pt with Dr. Goff, general surgery, who is agreeable  to plan for admission.      7:54 PM:  Dr. Rico, general surgery, is at bedside and will take the pt to surgery.  Patient agreeable to plan and all questions answered.                                  Clinical Impression:  Final diagnoses:  [K56.609] SBO (small bowel obstruction) (Primary)          ED Disposition Condition    Admit Stable                Ariadna Montanez MD  01/07/25 2006

## 2025-01-08 PROBLEM — E66.01 MORBID OBESITY: Status: ACTIVE | Noted: 2025-01-08

## 2025-01-08 PROBLEM — E87.5 HYPERKALEMIA: Status: ACTIVE | Noted: 2025-01-08

## 2025-01-08 LAB
ALBUMIN SERPL BCP-MCNC: 3.1 G/DL (ref 3.5–5.2)
ALP SERPL-CCNC: 53 U/L (ref 40–150)
ALT SERPL W/O P-5'-P-CCNC: 34 U/L (ref 10–44)
ANION GAP SERPL CALC-SCNC: 12 MMOL/L (ref 8–16)
AST SERPL-CCNC: 20 U/L (ref 10–40)
BASOPHILS # BLD AUTO: 0.02 K/UL (ref 0–0.2)
BASOPHILS NFR BLD: 0.2 % (ref 0–1.9)
BILIRUB SERPL-MCNC: 0.4 MG/DL (ref 0.1–1)
BUN SERPL-MCNC: 8 MG/DL (ref 6–20)
CALCIUM SERPL-MCNC: 8.8 MG/DL (ref 8.7–10.5)
CHLORIDE SERPL-SCNC: 99 MMOL/L (ref 95–110)
CO2 SERPL-SCNC: 30 MMOL/L (ref 23–29)
CREAT SERPL-MCNC: 0.6 MG/DL (ref 0.5–1.4)
DIFFERENTIAL METHOD BLD: ABNORMAL
EOSINOPHIL # BLD AUTO: 0 K/UL (ref 0–0.5)
EOSINOPHIL NFR BLD: 0 % (ref 0–8)
ERYTHROCYTE [DISTWIDTH] IN BLOOD BY AUTOMATED COUNT: 12.4 % (ref 11.5–14.5)
EST. GFR  (NO RACE VARIABLE): >60 ML/MIN/1.73 M^2
GLUCOSE SERPL-MCNC: 118 MG/DL (ref 70–110)
HCT VFR BLD AUTO: 36.1 % (ref 37–48.5)
HGB BLD-MCNC: 11.2 G/DL (ref 12–16)
IMM GRANULOCYTES # BLD AUTO: 0.08 K/UL (ref 0–0.04)
IMM GRANULOCYTES NFR BLD AUTO: 0.6 % (ref 0–0.5)
LYMPHOCYTES # BLD AUTO: 1.5 K/UL (ref 1–4.8)
LYMPHOCYTES NFR BLD: 11.7 % (ref 18–48)
MAGNESIUM SERPL-MCNC: 1.8 MG/DL (ref 1.6–2.6)
MCH RBC QN AUTO: 27.5 PG (ref 27–31)
MCHC RBC AUTO-ENTMCNC: 31 G/DL (ref 32–36)
MCV RBC AUTO: 89 FL (ref 82–98)
MONOCYTES # BLD AUTO: 0.9 K/UL (ref 0.3–1)
MONOCYTES NFR BLD: 6.5 % (ref 4–15)
NEUTROPHILS # BLD AUTO: 10.5 K/UL (ref 1.8–7.7)
NEUTROPHILS NFR BLD: 81 % (ref 38–73)
NRBC BLD-RTO: 0 /100 WBC
PLATELET # BLD AUTO: 519 K/UL (ref 150–450)
PMV BLD AUTO: 8.4 FL (ref 9.2–12.9)
POTASSIUM SERPL-SCNC: 2.6 MMOL/L (ref 3.5–5.1)
PROT SERPL-MCNC: 6.2 G/DL (ref 6–8.4)
RBC # BLD AUTO: 4.08 M/UL (ref 4–5.4)
SODIUM SERPL-SCNC: 141 MMOL/L (ref 136–145)
WBC # BLD AUTO: 13 K/UL (ref 3.9–12.7)

## 2025-01-08 PROCEDURE — 36415 COLL VENOUS BLD VENIPUNCTURE: CPT | Performed by: SURGERY

## 2025-01-08 PROCEDURE — 25000003 PHARM REV CODE 250: Performed by: HOSPITALIST

## 2025-01-08 PROCEDURE — 21400001 HC TELEMETRY ROOM

## 2025-01-08 PROCEDURE — 80053 COMPREHEN METABOLIC PANEL: CPT | Performed by: SURGERY

## 2025-01-08 PROCEDURE — 25000003 PHARM REV CODE 250: Performed by: SURGERY

## 2025-01-08 PROCEDURE — 63600175 PHARM REV CODE 636 W HCPCS: Performed by: SURGERY

## 2025-01-08 PROCEDURE — 99024 POSTOP FOLLOW-UP VISIT: CPT | Mod: ,,, | Performed by: SURGERY

## 2025-01-08 PROCEDURE — 85025 COMPLETE CBC W/AUTO DIFF WBC: CPT | Performed by: SURGERY

## 2025-01-08 PROCEDURE — 83735 ASSAY OF MAGNESIUM: CPT | Performed by: SURGERY

## 2025-01-08 RX ORDER — SODIUM CHLORIDE, SODIUM LACTATE, POTASSIUM CHLORIDE, CALCIUM CHLORIDE 600; 310; 30; 20 MG/100ML; MG/100ML; MG/100ML; MG/100ML
INJECTION, SOLUTION INTRAVENOUS CONTINUOUS
Status: DISCONTINUED | OUTPATIENT
Start: 2025-01-08 | End: 2025-01-13 | Stop reason: HOSPADM

## 2025-01-08 RX ORDER — POTASSIUM CHLORIDE 20 MEQ/1
40 TABLET, EXTENDED RELEASE ORAL ONCE
Status: COMPLETED | OUTPATIENT
Start: 2025-01-08 | End: 2025-01-08

## 2025-01-08 RX ORDER — ONDANSETRON HYDROCHLORIDE 2 MG/ML
4 INJECTION, SOLUTION INTRAVENOUS EVERY 6 HOURS PRN
Status: DISCONTINUED | OUTPATIENT
Start: 2025-01-08 | End: 2025-01-13 | Stop reason: HOSPADM

## 2025-01-08 RX ORDER — OXYCODONE AND ACETAMINOPHEN 5; 325 MG/1; MG/1
1 TABLET ORAL EVERY 4 HOURS PRN
Status: DISCONTINUED | OUTPATIENT
Start: 2025-01-08 | End: 2025-01-13 | Stop reason: HOSPADM

## 2025-01-08 RX ORDER — POTASSIUM CHLORIDE 7.45 MG/ML
10 INJECTION INTRAVENOUS
Status: COMPLETED | OUTPATIENT
Start: 2025-01-08 | End: 2025-01-08

## 2025-01-08 RX ORDER — SODIUM CHLORIDE 0.9 % (FLUSH) 0.9 %
10 SYRINGE (ML) INJECTION
Status: DISCONTINUED | OUTPATIENT
Start: 2025-01-08 | End: 2025-01-13 | Stop reason: HOSPADM

## 2025-01-08 RX ORDER — HYDROMORPHONE HYDROCHLORIDE 1 MG/ML
0.5 INJECTION, SOLUTION INTRAMUSCULAR; INTRAVENOUS; SUBCUTANEOUS EVERY 6 HOURS PRN
Status: DISCONTINUED | OUTPATIENT
Start: 2025-01-08 | End: 2025-01-13 | Stop reason: HOSPADM

## 2025-01-08 RX ADMIN — POTASSIUM CHLORIDE 40 MEQ: 1500 TABLET, EXTENDED RELEASE ORAL at 03:01

## 2025-01-08 RX ADMIN — PIPERACILLIN SODIUM AND TAZOBACTAM SODIUM 4.5 G: 4; .5 INJECTION, POWDER, LYOPHILIZED, FOR SOLUTION INTRAVENOUS at 07:01

## 2025-01-08 RX ADMIN — SODIUM CHLORIDE, POTASSIUM CHLORIDE, SODIUM LACTATE AND CALCIUM CHLORIDE: 600; 310; 30; 20 INJECTION, SOLUTION INTRAVENOUS at 12:01

## 2025-01-08 RX ADMIN — ONDANSETRON 4 MG: 2 INJECTION INTRAMUSCULAR; INTRAVENOUS at 06:01

## 2025-01-08 RX ADMIN — POTASSIUM CHLORIDE 10 MEQ: 7.46 INJECTION, SOLUTION INTRAVENOUS at 07:01

## 2025-01-08 RX ADMIN — POTASSIUM CHLORIDE 10 MEQ: 7.46 INJECTION, SOLUTION INTRAVENOUS at 06:01

## 2025-01-08 RX ADMIN — POTASSIUM CHLORIDE 10 MEQ: 7.46 INJECTION, SOLUTION INTRAVENOUS at 09:01

## 2025-01-08 RX ADMIN — SODIUM CHLORIDE, POTASSIUM CHLORIDE, SODIUM LACTATE AND CALCIUM CHLORIDE: 600; 310; 30; 20 INJECTION, SOLUTION INTRAVENOUS at 09:01

## 2025-01-08 RX ADMIN — PIPERACILLIN SODIUM AND TAZOBACTAM SODIUM 4.5 G: 4; .5 INJECTION, POWDER, LYOPHILIZED, FOR SOLUTION INTRAVENOUS at 12:01

## 2025-01-08 RX ADMIN — OXYCODONE HYDROCHLORIDE AND ACETAMINOPHEN 1 TABLET: 5; 325 TABLET ORAL at 07:01

## 2025-01-08 NOTE — H&P
History & Physical    SUBJECTIVE:     History of Present Illness:    Patient is a 54-year-old female postoperative patient of mine who presented to the emergency department today with abdominal pain and nausea/vomiting.  Patient is 8 days postop from a robotic converted to open cholecystectomy.  She was discharged on day 4 tolerating her diet and having flatus.  However since she has been home, she started having nausea and vomiting .  At 1st she was tolerating liquids but today she was not tolerating anything.  She has been passing gas , even as late as this morning.    CT scan consistent with small-bowel obstruction through umbilical robotic/laparoscopic port site hernia    Review of patient's allergies indicates:   Allergen Reactions    Shellfish containing products Anaphylaxis       Current Facility-Administered Medications   Medication Dose Route Frequency Provider Last Rate Last Admin    lactated ringers infusion   Intravenous Continuous Chirag Acuna MD        promethazine (PHENERGAN) 12.5 mg in 0.9% NaCl 50 mL IVPB  12.5 mg Intravenous ED 1 Time Tarik, Ariadna MCCARTHY MD         Current Outpatient Medications   Medication Sig Dispense Refill    acetaminophen (TYLENOL) 500 MG tablet Take 1 tablet (500 mg total) by mouth every 6 (six) hours as needed for Pain or Temperature greater than. 20 tablet 0    diclofenac sodium (VOLTAREN) 1 % Gel Apply 2 g topically 4 (four) times daily. (Patient not taking: Reported on 7/24/2024) 350 g 2    hydroCHLOROthiazide (MICROZIDE) 12.5 mg capsule Take 1 capsule (12.5 mg total) by mouth daily as needed (leg swelling). 90 capsule 3    mv-mn/iron fum/FA/omega3,6,9#3 (WOMEN'S MULTI ORAL) Take by mouth Daily.      nabumetone (RELAFEN) 500 MG tablet Take 1.5 tablets (750 mg total) by mouth once daily. Start AFTER completion of Medrol dose min. (Patient not taking: Reported on 7/24/2024) 60 tablet 1    omeprazole (PRILOSEC) 20 MG capsule Take 1 capsule (20 mg total) by mouth once daily.  "90 capsule 0    ondansetron (ZOFRAN-ODT) 4 MG TbDL Take 1 tablet (4 mg total) by mouth every 6 (six) hours as needed (nausea). 12 tablet 0    oxyCODONE-acetaminophen (PERCOCET) 5-325 mg per tablet Take 1 tablet by mouth every 4 (four) hours as needed for Pain. 25 tablet 0       Past Medical History:   Diagnosis Date    Fibroids 1995     Past Surgical History:   Procedure Laterality Date    CHOLECYSTECTOMY N/A 12/30/2024    Procedure: CHOLECYSTECTOMY;  Surgeon: Chirag Acuna MD;  Location: [facility];  Service: General;  Laterality: N/A;    ROBOT-ASSISTED CHOLECYSTECTOMY N/A 12/30/2024    Procedure: ROBOTIC CHOLECYSTECTOMY, attempted & converted to open;  Surgeon: Chirag Acuna MD;  Location: [facility];  Service: General;  Laterality: N/A;    UTERINE FIBROID SURGERY       Family History   Problem Relation Name Age of Onset    Diabetes Father      Breast cancer Neg Hx      Ovarian cancer Neg Hx       Social History     Tobacco Use    Smoking status: Never    Smokeless tobacco: Never   Substance Use Topics    Alcohol use: Yes     Alcohol/week: 0.0 standard drinks of alcohol    Drug use: No        Review of Systems:  Review of Systems    OBJECTIVE:     Vital Signs (Most Recent)  Temp: 98.2 °F (36.8 °C) (01/07/25 1921)  Pulse: 95 (01/07/25 1921)  Resp: 17 (01/07/25 1921)  BP: (!) 164/96 (01/07/25 1921)  SpO2: 98 % (01/07/25 1921)  5' 2" (1.575 m)  108.9 kg (240 lb)     Physical Exam:  Physical Exam  Constitutional:       Appearance: Normal appearance.      Comments: Malaise and somewhat uncomfortable.  No obvious distress   Eyes:      General: No scleral icterus.  Cardiovascular:      Rate and Rhythm: Normal rate and regular rhythm.   Pulmonary:      Breath sounds: Normal breath sounds.   Abdominal:      General: There is no distension.      Palpations: Abdomen is soft.      Tenderness: There is abdominal tenderness.      Comments: Tender near the umbilical port site with some fullness at that area   Musculoskeletal:    "      General: Normal range of motion.      Cervical back: Neck supple.   Lymphadenopathy:      Cervical: No cervical adenopathy.   Skin:     General: Skin is warm and dry.      Findings: No rash.   Neurological:      General: No focal deficit present.      Mental Status: She is alert.   Psychiatric:         Mood and Affect: Mood normal.         Laboratory  CBC: Reviewed  CMP: Reviewed    Diagnostic Results:  CT: Reviewed  See HPI    ASSESSMENT/PLAN:     Small-bowel obstruction through umbilical robotic/laparoscopic port site hernia    PLAN:Plan     Exploratory laparotomy  We will attempt to examine the bowel in question.  Patient understands bowel resection could be required if the bowel vasculature has been compromised    The general risks of the operation were described to the patient in detail and included on an informed consent sheet, which I reviewed with the patient and which the patient has signed.  Illustrations were provided for clarity.  The patient was offered the opportunity to ask questions, on multiple occasions, and after continued discussion had none additional.  The patient is ready to proceed with the operation.  Call the office or report to the emergency department if symptoms worsen prior to the operation

## 2025-01-08 NOTE — PLAN OF CARE
POC reviewed with the patient. AOX4 VSS on RA. Patient ambulates independently with one assist stand-by, tolerated pain well. Potassium replacement completed,  all due medications given as per MAR. No further complaints noted within the shift. Purposeful rounding done. Safety precaution maintained.     Problem: Adult Inpatient Plan of Care  Goal: Plan of Care Review  Outcome: Progressing  Goal: Patient-Specific Goal (Individualized)  Outcome: Progressing  Goal: Absence of Hospital-Acquired Illness or Injury  Outcome: Progressing  Goal: Optimal Comfort and Wellbeing  Outcome: Progressing  Goal: Readiness for Transition of Care  Outcome: Progressing     Problem: Bariatric Environmental Safety  Goal: Safety Maintained with Care  Outcome: Progressing     Problem: Wound  Goal: Optimal Coping  Outcome: Progressing  Goal: Optimal Functional Ability  Outcome: Progressing  Goal: Absence of Infection Signs and Symptoms  Outcome: Progressing  Goal: Improved Oral Intake  Outcome: Progressing  Goal: Optimal Pain Control and Function  Outcome: Progressing  Goal: Skin Health and Integrity  Outcome: Progressing  Goal: Optimal Wound Healing  Outcome: Progressing     Problem: Fall Injury Risk  Goal: Absence of Fall and Fall-Related Injury  Outcome: Progressing

## 2025-01-08 NOTE — ASSESSMENT & PLAN NOTE
-Admitted by Dr. Acuna  -S/p exploratory laparotomy with lysis of adhesions and incisional hernia repair on 1/7  -She remains afebrile and bowel function appears to be returning - is passing flatus and on trial of clear liquids  -Noted stable leukocytosis and addition of zosyn  -Defer routine post-operative management to primary service

## 2025-01-08 NOTE — TRANSFER OF CARE
"Anesthesia Transfer of Care Note    Patient: Nadir Abernathy    Procedure(s) Performed: Procedure(s) (LRB):  REPAIR, HERNIA, INCISIONAL (N/A)    Patient location: PACU    Anesthesia Type: general    Transport from OR: Transported from OR on 6-10 L/min O2 by face mask with adequate spontaneous ventilation    Post pain: adequate analgesia    Post assessment: no apparent anesthetic complications and tolerated procedure well    Post vital signs: stable    Level of consciousness: awake and alert    Nausea/Vomiting: no nausea/vomiting    Complications: none    Transfer of care protocol was followed      Last vitals: Visit Vitals  BP (!) 164/96 (BP Location: Left arm, Patient Position: Sitting)   Pulse 95   Temp 36.3 °C (97.4 °F)   Resp 17   Ht 5' 2" (1.575 m)   Wt 108.9 kg (240 lb)   LMP 12/03/2024 (Approximate)   SpO2 98%   BMI 43.90 kg/m²     "

## 2025-01-08 NOTE — CONSULTS
[facility] [address] Medicine  Consult Note    Patient Name: Nadir Abernathy  MRN: [id number]  Admission Date: 1/7/2025  Hospital Length of Stay: 1 days  Attending Physician: Chirag Diaz MD  Primary Care Provider: Jag Lake MD           Patient information was obtained from patient, past medical records, and ER records.     Consults  Subjective:     Principal Problem: SBO (small bowel obstruction)    Chief Complaint:   Chief Complaint   Patient presents with    Nausea     Had gallbladder removed 12/20 and started vomiting Friday after she was discharged home. Has not been able to hold anything down since.         HPI: Ms. Abernathy is a 54 year old woman with morbid obesity who presented for evaluation of nausea, vomiting and abdominal pain.  Of note she underwent laparoscopic converted to open cholecystectomy recently and was discharged home with SAMANTHA drain in place.  Since going home she has been unable to keep anything down because of severe nausea and vomiting.  Upon presentation yesterday she was found to have a small bowel obstruction due to an incisional hernia.  She was admitted by Dr. Chirag Acuna and taken to the OR for exploratory laparotomy with lysis of adhesions and incisional hernia repair.  She is passing flatus and trialing clear liquids and was found to have significant hypokalemia this morning.  I have been consulted to provide medical co-management services.  At this time she is feeling better, but still somewhat struggling with pain.  She denies fevers, chills, cough, chest pain, dysuria, numbness, headaches and tingling.    Past Medical History:   Diagnosis Date    Fibroids 1995       Past Surgical History:   Procedure Laterality Date    CHOLECYSTECTOMY N/A 12/30/2024    Procedure: CHOLECYSTECTOMY;  Surgeon: Chirag Acuna MD;  Location: [facility];  Service: General;  Laterality: N/A;    REPAIR, HERNIA, INCISIONAL N/A 1/7/2025    Procedure: REPAIR, HERNIA,  INCISIONAL;  Surgeon: Chirag Acuna MD;  Location: [facility];  Service: General;  Laterality: N/A;    ROBOT-ASSISTED CHOLECYSTECTOMY N/A 12/30/2024    Procedure: ROBOTIC CHOLECYSTECTOMY, attempted & converted to open;  Surgeon: Chirag Acuna MD;  Location: [facility] OR;  Service: General;  Laterality: N/A;    UTERINE FIBROID SURGERY         Review of patient's allergies indicates:   Allergen Reactions    Shellfish containing products Anaphylaxis       No current facility-administered medications on file prior to encounter.     Current Outpatient Medications on File Prior to Encounter   Medication Sig    acetaminophen (TYLENOL) 500 MG tablet Take 1 tablet (500 mg total) by mouth every 6 (six) hours as needed for Pain or Temperature greater than.    diclofenac sodium (VOLTAREN) 1 % Gel Apply 2 g topically 4 (four) times daily. (Patient not taking: Reported on 7/24/2024)    hydroCHLOROthiazide (MICROZIDE) 12.5 mg capsule Take 1 capsule (12.5 mg total) by mouth daily as needed (leg swelling).    mv-mn/iron fum/FA/omega3,6,9#3 (WOMEN'S MULTI ORAL) Take by mouth Daily.    nabumetone (RELAFEN) 500 MG tablet Take 1.5 tablets (750 mg total) by mouth once daily. Start AFTER completion of Medrol dose min. (Patient not taking: Reported on 7/24/2024)    omeprazole (PRILOSEC) 20 MG capsule Take 1 capsule (20 mg total) by mouth once daily.    ondansetron (ZOFRAN-ODT) 4 MG TbDL Take 1 tablet (4 mg total) by mouth every 6 (six) hours as needed (nausea).    oxyCODONE-acetaminophen (PERCOCET) 5-325 mg per tablet Take 1 tablet by mouth every 4 (four) hours as needed for Pain.     Family History       Problem Relation (Age of Onset)    Diabetes Father          Tobacco Use    Smoking status: Never    Smokeless tobacco: Never   Substance and Sexual Activity    Alcohol use: Yes     Alcohol/week: 0.0 standard drinks of alcohol    Drug use: No    Sexual activity: Yes     Partners: Male     Review of Systems   All other systems reviewed and are  negative.    Objective:     Vital Signs (Most Recent):  Temp: 98.5 °F (36.9 °C) (01/08/25 1123)  Pulse: 88 (01/08/25 1123)  Resp: 16 (01/08/25 1123)  BP: (!) 142/79 (01/08/25 1123)  SpO2: 97 % (01/08/25 1123) Vital Signs (24h Range):  Temp:  [97.4 °F (36.3 °C)-98.8 °F (37.1 °C)] 98.5 °F (36.9 °C)  Pulse:  [] 88  Resp:  [16-18] 16  SpO2:  [95 %-100 %] 97 %  BP: (132-178)/() 142/79     Weight: 108.9 kg (240 lb)  Body mass index is 43.9 kg/m².     Physical Exam  Vitals and nursing note reviewed.   Constitutional:       General: She is not in acute distress.     Appearance: She is well-developed. She is obese. She is not ill-appearing, toxic-appearing or diaphoretic.   HENT:      Head: Normocephalic and atraumatic.      Right Ear: External ear normal.      Left Ear: External ear normal.      Nose: Nose normal.      Mouth/Throat:      Mouth: Mucous membranes are moist.   Eyes:      Extraocular Movements: Extraocular movements intact.      Conjunctiva/sclera: Conjunctivae normal.   Cardiovascular:      Rate and Rhythm: Normal rate and regular rhythm.      Heart sounds: Normal heart sounds.   Pulmonary:      Effort: Pulmonary effort is normal. No respiratory distress.      Breath sounds: Normal breath sounds.   Abdominal:      Comments: Soft, obese, mild diffuse TTP.  Large surgical incision with staples is c/d/I.  Small incision from this admit is bandaged.  She has no rebound or guarding.  Bowel sounds are minimal but present.  SAMANTHA drain is in place with small amount of dark fluid.   Musculoskeletal:         General: Normal range of motion.      Cervical back: Normal range of motion. No rigidity.   Skin:     General: Skin is warm and dry.   Neurological:      Mental Status: She is alert and oriented to person, place, and time.      Cranial Nerves: No cranial nerve deficit.      Coordination: Coordination normal.   Psychiatric:         Behavior: Behavior normal.          Significant Labs: All pertinent labs  within the past 24 hours have been reviewed.    Significant Imaging: I have reviewed all pertinent imaging results/findings within the past 24 hours.  Assessment/Plan:     * SBO (small bowel obstruction)  -Admitted by Dr. Acuna  -S/p exploratory laparotomy with lysis of adhesions and incisional hernia repair on 1/7  -She remains afebrile and bowel function appears to be returning - is passing flatus and on trial of clear liquids  -Noted stable leukocytosis and addition of zosyn  -Defer routine post-operative management to primary service      Hyperkalemia  -K markedly low today  -Has received 30mEq by iv today.  Will give additional 40mEq po x1 today  -Add on magnesium - will follow up this afternoon and replace if low.  -Repeat labs in AM.    Morbid obesity  Body mass index is 43.9 kg/m². Morbid obesity complicates all aspects of disease management from diagnostic modalities to treatment. Weight loss encouraged and health benefits explained to patient.      VTE Risk Mitigation (From admission, onward)           Ordered     IP VTE HIGH RISK PATIENT  Once         01/08/25 0016     Place sequential compression device  Until discontinued         01/08/25 0016                        Thank you for your consult. I will follow-up with patient. Please contact us if you have any additional questions.    Chirag Diaz MD  Department of Hospital Medicine   [facility] ([address])

## 2025-01-08 NOTE — PROGRESS NOTES
[facility] ([address])  General Surgery  Progress Note    Subjective:     Interval History:  Postop day 1 status post incisional hernia repair with small-bowel obstruction.  Preoperative pain and nausea is gone.  She only complains of incisional pain.  She had a large amount of flatus this morning.    Post-Op Info:  Procedure(s) (LRB):  REPAIR, HERNIA, INCISIONAL (N/A)   1 Day Post-Op      Medications:  Continuous Infusions:   lactated ringers   Intravenous Continuous 100 mL/hr at 01/08/25 0932 New Bag at 01/08/25 0932     Scheduled Meds:  PRN Meds:  Current Facility-Administered Medications:     HYDROmorphone, 0.5 mg, Intravenous, Q6H PRN    ondansetron, 4 mg, Intravenous, Q6H PRN    oxyCODONE-acetaminophen, 1 tablet, Oral, Q4H PRN    sodium chloride 0.9%, 10 mL, Intravenous, PRN     Objective:     Vital Signs (Most Recent):  Temp: 98 °F (36.7 °C) (01/08/25 0707)  Pulse: 86 (Simultaneous filing. User may not have seen previous data.) (01/08/25 0707)  Resp: 16 (01/08/25 0707)  BP: 132/82 (01/08/25 0707)  SpO2: 97 % (01/08/25 0707) Vital Signs (24h Range):  Temp:  [97.4 °F (36.3 °C)-98.8 °F (37.1 °C)] 98 °F (36.7 °C)  Pulse:  [] 86  Resp:  [16-18] 16  SpO2:  [95 %-100 %] 97 %  BP: (132-179)/() 132/82       Intake/Output Summary (Last 24 hours) at 1/8/2025 1049  Last data filed at 1/8/2025 0707  Gross per 24 hour   Intake 2400 ml   Output 300 ml   Net 2100 ml       Physical Exam  General brighter no apparent distress  Abdomen soft tender only incisions.  Bowel sounds present.  Dark thick fluid from peritoneal drain and gallbladder fossa  Significant Labs:  CBC:   Recent Labs   Lab 01/08/25  0331   WBC 13.00*   RBC 4.08   HGB 11.2*   HCT 36.1*   *   MCV 89   MCH 27.5   MCHC 31.0*     CMP:   Recent Labs   Lab 01/08/25  0331   *   CALCIUM 8.8   ALBUMIN 3.1*   PROT 6.2      K 2.6*   CO2 30*   CL 99   BUN 8   CREATININE 0.6   ALKPHOS 53   ALT 34   AST 20   BILITOT 0.4        Significant Diagnostics:  None    Assessment/Plan:     Active Diagnoses:    Diagnosis Date Noted POA    PRINCIPAL PROBLEM:  SBO (small bowel obstruction) [K56.609] 01/07/2025 Yes      Problems Resolved During this Admission:   Small bowel obstruction relieved with hernia repair yesterday evening.  Advance diet to clear liquids as she is having flatus and no nausea    CT scan revealed some multilocular small collection in the gallbladder fossa measuring 3.5 x 2.0 cm in maximum dimensions.  Darkish thick fluid from peritoneal drain in gallbladder fossa.  Bilirubin normal.  Low output.  We will add Zosyn for antibiotic coverage.  White count persistent at 13 but afebrile    Hypokalemia.  Replacement underway.  We will monitor      Chirag Acuna MD  General Surgery  [facility] ([address])

## 2025-01-08 NOTE — PLAN OF CARE
01/08/25 1114   Readmission   Was this a planned readmission? No   Why were you hospitalized in the last 30 days? gallbaldder surgery   Why were you readmitted? Related to previous admission   When you left the hospital how did you feel? wasn't feelign too good   When you left the hospital where did you go? Home Alone   Did patient/caregiver refused recommended DC plan? No   Tell me about what happened between when you left the hospital and the day you returned. came back yesterday for hernia blockage and had a procedure.

## 2025-01-08 NOTE — ASSESSMENT & PLAN NOTE
-K markedly low today  -Has received 30mEq by iv today.  Will give additional 40mEq po x1 today  -Add on magnesium - will follow up this afternoon and replace if low.  -Repeat labs in AM.

## 2025-01-08 NOTE — ANESTHESIA POSTPROCEDURE EVALUATION
Anesthesia Post Evaluation    Patient: Nadir Abernathy    Procedure(s) Performed: Procedure(s) (LRB):  REPAIR, HERNIA, INCISIONAL (N/A)    Final Anesthesia Type: general      Patient location during evaluation: PACU  Patient participation: Yes- Able to Participate  Level of consciousness: awake and alert  Post-procedure vital signs: reviewed and stable  Pain management: adequate  Airway patency: patent    PONV status at discharge: No PONV  Anesthetic complications: no      Cardiovascular status: blood pressure returned to baseline  Respiratory status: spontaneous ventilation  Hydration status: euvolemic  Follow-up not needed.          Vitals Value Taken Time   /79 01/07/25 2316   Temp 36.3 °C (97.4 °F) 01/07/25 2241   Pulse 108 01/07/25 2328   Resp 16 01/07/25 2315   SpO2 100 % 01/07/25 2328   Vitals shown include unfiled device data.      Event Time   Out of Recovery 23:32:30         Pain/Abdiel Score: Pain Rating Prior to Med Admin: 7 (1/7/2025 11:19 PM)  Pain Rating Post Med Admin: 3 (1/7/2025  4:13 PM)  Abdiel Score: 9 (1/7/2025 11:15 PM)

## 2025-01-08 NOTE — ED NOTES
Report given by JOE Du. Assumed care of this patient. Received patient A&Ox4. Safety measures in place; maintained side rails up x2. Call light within pt reach. Plan of care ongoing.

## 2025-01-08 NOTE — PLAN OF CARE
Assessment completed with patient at bedside---patient alert and oriented. Patient denies HH, DME, dialysis or coumadin. Patient takes meds as prescribed and can afford with insurance. Patient sister will transport patient home.     [facility] ([address])  Initial Discharge Assessment       Primary Care Provider: Jag Lake MD    Admission Diagnosis: SBO (small bowel obstruction) [K56.609]    Admission Date: 1/7/2025  Expected Discharge Date:     Transition of Care Barriers: None    Payor: MEDICAID / Plan: [employer] / Product Type: Managed Medicaid /     Extended Emergency Contact Information  Primary Emergency Contact: Crystal Kitchen   [place]  Mobile Phone: [phone]  Relation: Sister    Discharge Plan A: Home  Discharge Plan B: Home      [id number] - [place], [place] - [address]  [address]  [facility] [address]  Phone: [phone] Fax: [phone]      Initial Assessment (most recent)       Adult Discharge Assessment - 01/08/25 1108          Discharge Assessment    Assessment Type Discharge Planning Assessment     Confirmed/corrected address, phone number and insurance Yes     Confirmed Demographics Correct on Facesheet     Source of Information patient     Communicated ELISABETH with patient/caregiver Date not available/Unable to determine     People in Home alone     Do you expect to return to your current living situation? Yes     Do you have help at home or someone to help you manage your care at home? No     Prior to hospitilization cognitive status: Alert/Oriented     Current cognitive status: Alert/Oriented     Walking or Climbing Stairs Difficulty no     Dressing/Bathing Difficulty no     Equipment Currently Used at Home none     Readmission within 30 days? Yes     Patient currently being followed by outpatient case management? No     Do you currently have service(s) that  help you manage your care at home? No     Do you take prescription medications? Yes     Do you have prescription coverage? Yes     Coverage Medicaid     Do you have any problems affording any of your prescribed medications? No     Is the patient taking medications as prescribed? yes     Who is going to help you get home at discharge? Crystal Kitchen (Sister)  [phone]     How do you get to doctors appointments? other (see comments)   uber    Are you on dialysis? No     Do you take coumadin? No     Discharge Plan A Home     Discharge Plan B Home     DME Needed Upon Discharge  none     Discharge Plan discussed with: Patient     Transition of Care Barriers None

## 2025-01-08 NOTE — ASSESSMENT & PLAN NOTE
Body mass index is 43.9 kg/m². Morbid obesity complicates all aspects of disease management from diagnostic modalities to treatment. Weight loss encouraged and health benefits explained to patient.

## 2025-01-08 NOTE — ANESTHESIA PREPROCEDURE EVALUATION
01/07/2025  Nadir Abernathy is a 54 y.o., female.      Pre-op Assessment    I have reviewed the Patient Summary Reports.     I have reviewed the Nursing Notes. I have reviewed the NPO Status.   I have reviewed the Medications.     Review of Systems  Anesthesia Hx:  No problems with previous Anesthesia             Denies Family Hx of Anesthesia complications.    Denies Personal Hx of Anesthesia complications.                    Social:  Non-Smoker       Hematology/Oncology:  Hematology Normal   Oncology Normal                                   EENT/Dental:  chronic allergic rhinitis           Cardiovascular:  Exercise tolerance: good   Hypertension                                          Pulmonary:  Pulmonary Normal                       Renal/:  Renal/ Normal                 Musculoskeletal:  Musculoskeletal Normal                Neurological:  Neurology Normal                                      Endocrine:  Endocrine Normal          Morbid Obesity / BMI > 40  Dermatological:  Skin Normal    Psych:  Psychiatric Normal                  Physical Exam  General: Well nourished, Cooperative, Oriented and Alert    Airway:  Mallampati: II / II  Mouth Opening: Normal  TM Distance: Normal  Neck ROM: Normal ROM    Dental:  Intact      Anesthesia Plan  Type of Anesthesia, risks & benefits discussed:    Anesthesia Type: Gen ETT  Intra-op Monitoring Plan: Standard ASA Monitors  Post Op Pain Control Plan: multimodal analgesia  Induction:  IV and rapid sequence  Airway Plan: Video and Direct  Informed Consent: Informed consent signed with the Patient and all parties understand the risks and agree with anesthesia plan.  All questions answered.   ASA Score: 3 Emergent  Day of Surgery Review of History & Physical: H&P Update referred to the surgeon/provider.  Anesthesia Plan Notes: [facility]on at [facility] 12/30/24; now  returns w/hernia      NG tube placed in OR prior to induction with approx 400mL of billious output    Ready For Surgery From Anesthesia Perspective.     .

## 2025-01-08 NOTE — SUBJECTIVE & OBJECTIVE
Past Medical History:   Diagnosis Date    Fibroids 1995       Past Surgical History:   Procedure Laterality Date    CHOLECYSTECTOMY N/A 12/30/2024    Procedure: CHOLECYSTECTOMY;  Surgeon: Chirag Acuna MD;  Location: [facility];  Service: General;  Laterality: N/A;    REPAIR, HERNIA, INCISIONAL N/A 1/7/2025    Procedure: REPAIR, HERNIA, INCISIONAL;  Surgeon: Chirag Acuna MD;  Location: [facility] OR;  Service: General;  Laterality: N/A;    ROBOT-ASSISTED CHOLECYSTECTOMY N/A 12/30/2024    Procedure: ROBOTIC CHOLECYSTECTOMY, attempted & converted to open;  Surgeon: Chirag Acuna MD;  Location: [facility] OR;  Service: General;  Laterality: N/A;    UTERINE FIBROID SURGERY         Review of patient's allergies indicates:   Allergen Reactions    Shellfish containing products Anaphylaxis       No current facility-administered medications on file prior to encounter.     Current Outpatient Medications on File Prior to Encounter   Medication Sig    acetaminophen (TYLENOL) 500 MG tablet Take 1 tablet (500 mg total) by mouth every 6 (six) hours as needed for Pain or Temperature greater than.    diclofenac sodium (VOLTAREN) 1 % Gel Apply 2 g topically 4 (four) times daily. (Patient not taking: Reported on 7/24/2024)    hydroCHLOROthiazide (MICROZIDE) 12.5 mg capsule Take 1 capsule (12.5 mg total) by mouth daily as needed (leg swelling).    mv-mn/iron fum/FA/omega3,6,9#3 (WOMEN'S MULTI ORAL) Take by mouth Daily.    nabumetone (RELAFEN) 500 MG tablet Take 1.5 tablets (750 mg total) by mouth once daily. Start AFTER completion of Medrol dose min. (Patient not taking: Reported on 7/24/2024)    omeprazole (PRILOSEC) 20 MG capsule Take 1 capsule (20 mg total) by mouth once daily.    ondansetron (ZOFRAN-ODT) 4 MG TbDL Take 1 tablet (4 mg total) by mouth every 6 (six) hours as needed (nausea).    oxyCODONE-acetaminophen (PERCOCET) 5-325 mg per tablet Take 1 tablet by mouth every 4 (four) hours as needed for Pain.     Family History        Problem Relation (Age of Onset)    Diabetes Father          Tobacco Use    Smoking status: Never    Smokeless tobacco: Never   Substance and Sexual Activity    Alcohol use: Yes     Alcohol/week: 0.0 standard drinks of alcohol    Drug use: No    Sexual activity: Yes     Partners: Male     Review of Systems   All other systems reviewed and are negative.    Objective:     Vital Signs (Most Recent):  Temp: 98.5 °F (36.9 °C) (01/08/25 1123)  Pulse: 88 (01/08/25 1123)  Resp: 16 (01/08/25 1123)  BP: (!) 142/79 (01/08/25 1123)  SpO2: 97 % (01/08/25 1123) Vital Signs (24h Range):  Temp:  [97.4 °F (36.3 °C)-98.8 °F (37.1 °C)] 98.5 °F (36.9 °C)  Pulse:  [] 88  Resp:  [16-18] 16  SpO2:  [95 %-100 %] 97 %  BP: (132-178)/() 142/79     Weight: 108.9 kg (240 lb)  Body mass index is 43.9 kg/m².     Physical Exam  Vitals and nursing note reviewed.   Constitutional:       General: She is not in acute distress.     Appearance: She is well-developed. She is obese. She is not ill-appearing, toxic-appearing or diaphoretic.   HENT:      Head: Normocephalic and atraumatic.      Right Ear: External ear normal.      Left Ear: External ear normal.      Nose: Nose normal.      Mouth/Throat:      Mouth: Mucous membranes are moist.   Eyes:      Extraocular Movements: Extraocular movements intact.      Conjunctiva/sclera: Conjunctivae normal.   Cardiovascular:      Rate and Rhythm: Normal rate and regular rhythm.      Heart sounds: Normal heart sounds.   Pulmonary:      Effort: Pulmonary effort is normal. No respiratory distress.      Breath sounds: Normal breath sounds.   Abdominal:      Comments: Soft, obese, mild diffuse TTP.  Large surgical incision with staples is c/d/I.  Small incision from this admit is bandaged.  She has no rebound or guarding.  Bowel sounds are minimal but present.  SAMANTHA drain is in place with small amount of dark fluid.   Musculoskeletal:         General: Normal range of motion.      Cervical back: Normal range  of motion. No rigidity.   Skin:     General: Skin is warm and dry.   Neurological:      Mental Status: She is alert and oriented to person, place, and time.      Cranial Nerves: No cranial nerve deficit.      Coordination: Coordination normal.   Psychiatric:         Behavior: Behavior normal.          Significant Labs: All pertinent labs within the past 24 hours have been reviewed.    Significant Imaging: I have reviewed all pertinent imaging results/findings within the past 24 hours.

## 2025-01-08 NOTE — OP NOTE
DATE OF PROCEDURE: 01/07/2025      PREOPERATIVE DIAGNOSIS: Small-bowel obstruction through umbilical robotic/laparoscopic port site hernia   POSTOPERATIVE DIAGNOSIS:  Same    PROCEDURE PERFORMED:  Incisional Hernia repair ; 2cm defect  Lysis of adhesions    ATTENDING SURGEON: Chirag Acuna MD.       ANESTHESIA: General endotracheal.     ESTIMATED BLOOD LOSS: 5 mL.     FINDINGS:  Incarcerated non strangulated small bowel.  No ischemia    SPECIMENs:  None    DRAINS: None.     COMPLICATIONS: None.       OPERATIVE PROCEDURE: The patient was identified in Preoperative Holding and  brought back to the Operating Room. Placed supine on the operating table and padded appropriately. Monitors were applied and there was smooth induction of general endotracheal anesthesia.  The patient's abdomen was prepped and draped in the standard sterile surgical fashion. A time-out was performed and all team members present agreed this was the correct procedure on the correct patient. We also confirmed administration of appropriate preoperative antibiotics.    Local anesthesia is injected.  Previous supraumbilical port site incision is extended in each direction by 2 cm.  It was taken down to the fascia with blunt dissection and electrocautery.  Small knuckle of bowel is seen protruding up through previous port site.  Previously placed sutures are removed.  The bowel was adhesed to the fascial opening and to some of the soft tissue just above this.  This was taken down with blunt dissection and occasional sharp dissection with scissors.  This completely freed the knuckle of bowel.  It was run proximally and distally several cm and was completely viable.  There was no sign of venous congestion or ischemia.  It was returned to the abdomen.  The underside of the fascia was clear.  With the fascia elevated and edges clearly seen, the defect was closed with interrupted 0 Ethibond sutures.  They were 1st placed and untied.  The fascia was then  elevated away from the bowel, and the sutures were tied completely closing the defect securely.  Wound is irrigated.  Some of the deeper tissues are closed with interrupted 3-0 Vicryl to close dead space.  Dermis and skin was closed with a running 3-0 Stratafix.     Steri-Strips were placed. The patient was extubated in the Operating Room and transported to the Recovery Room in stable condition. All sponge, instrument and needle counts were correct at the end of the case. I was present and scrubbed for the entire procedure.

## 2025-01-08 NOTE — HPI
"As per Chirag Diaz MD:    "Ms. Abernathy is a 54 year-old woman with morbid obesity who presented for evaluation of nausea, vomiting and abdominal pain.  Of note she underwent laparoscopic converted to open cholecystectomy recently and was discharged home with SAMANTHA drain in place.  Since going home she has been unable to keep anything down because of severe nausea and vomiting.  Upon presentation yesterday she was found to have a small bowel obstruction due to an incisional hernia.  She was admitted by Dr. Chirag Acuna and taken to the OR for exploratory laparotomy with lysis of adhesions and incisional hernia repair.  She is passing flatus and trialing clear liquids and was found to have significant hypokalemia this morning.  I have been consulted to provide medical co-management services.  At this time she is feeling better, but still somewhat struggling with pain.  She denies fevers, chills, cough, chest pain, dysuria, numbness, headaches and tingling."  "

## 2025-01-09 LAB
ALBUMIN SERPL BCP-MCNC: 2.8 G/DL (ref 3.5–5.2)
ALP SERPL-CCNC: 49 U/L (ref 40–150)
ALT SERPL W/O P-5'-P-CCNC: 20 U/L (ref 10–44)
ANION GAP SERPL CALC-SCNC: 10 MMOL/L (ref 8–16)
AST SERPL-CCNC: 14 U/L (ref 10–40)
BILIRUB DIRECT SERPL-MCNC: 0.3 MG/DL (ref 0.1–0.3)
BILIRUB SERPL-MCNC: 0.5 MG/DL (ref 0.1–1)
BUN SERPL-MCNC: 6 MG/DL (ref 6–20)
CALCIUM SERPL-MCNC: 8.9 MG/DL (ref 8.7–10.5)
CHLORIDE SERPL-SCNC: 97 MMOL/L (ref 95–110)
CO2 SERPL-SCNC: 32 MMOL/L (ref 23–29)
CREAT SERPL-MCNC: 0.7 MG/DL (ref 0.5–1.4)
EST. GFR  (NO RACE VARIABLE): >60 ML/MIN/1.73 M^2
GLUCOSE SERPL-MCNC: 91 MG/DL (ref 70–110)
POTASSIUM SERPL-SCNC: 2.6 MMOL/L (ref 3.5–5.1)
PROT SERPL-MCNC: 5.9 G/DL (ref 6–8.4)
SODIUM SERPL-SCNC: 139 MMOL/L (ref 136–145)

## 2025-01-09 PROCEDURE — 63600175 PHARM REV CODE 636 W HCPCS: Performed by: SURGERY

## 2025-01-09 PROCEDURE — 36415 COLL VENOUS BLD VENIPUNCTURE: CPT | Performed by: INTERNAL MEDICINE

## 2025-01-09 PROCEDURE — 25000003 PHARM REV CODE 250: Performed by: INTERNAL MEDICINE

## 2025-01-09 PROCEDURE — 25000003 PHARM REV CODE 250: Performed by: SURGERY

## 2025-01-09 PROCEDURE — 21400001 HC TELEMETRY ROOM

## 2025-01-09 PROCEDURE — 80048 BASIC METABOLIC PNL TOTAL CA: CPT | Performed by: INTERNAL MEDICINE

## 2025-01-09 PROCEDURE — 80076 HEPATIC FUNCTION PANEL: CPT | Performed by: INTERNAL MEDICINE

## 2025-01-09 RX ORDER — DOCUSATE SODIUM 100 MG
200 CAPSULE ORAL
Status: DISCONTINUED | OUTPATIENT
Start: 2025-01-09 | End: 2025-01-13 | Stop reason: HOSPADM

## 2025-01-09 RX ORDER — POTASSIUM CHLORIDE 20 MEQ/1
40 TABLET, EXTENDED RELEASE ORAL 3 TIMES DAILY
Status: COMPLETED | OUTPATIENT
Start: 2025-01-09 | End: 2025-01-10

## 2025-01-09 RX ORDER — DOCUSATE SODIUM 100 MG
CAPSULE ORAL
Status: CANCELLED | OUTPATIENT
Start: 2025-01-09

## 2025-01-09 RX ADMIN — PIPERACILLIN SODIUM AND TAZOBACTAM SODIUM 4.5 G: 4; .5 INJECTION, POWDER, LYOPHILIZED, FOR SOLUTION INTRAVENOUS at 04:01

## 2025-01-09 RX ADMIN — PIPERACILLIN SODIUM AND TAZOBACTAM SODIUM 4.5 G: 4; .5 INJECTION, POWDER, LYOPHILIZED, FOR SOLUTION INTRAVENOUS at 08:01

## 2025-01-09 RX ADMIN — Medication 200 ML: at 08:01

## 2025-01-09 RX ADMIN — OXYCODONE HYDROCHLORIDE AND ACETAMINOPHEN 1 TABLET: 5; 325 TABLET ORAL at 08:01

## 2025-01-09 RX ADMIN — Medication 200 ML: at 03:01

## 2025-01-09 RX ADMIN — POTASSIUM CHLORIDE 40 MEQ: 1500 TABLET, EXTENDED RELEASE ORAL at 02:01

## 2025-01-09 RX ADMIN — SODIUM CHLORIDE, POTASSIUM CHLORIDE, SODIUM LACTATE AND CALCIUM CHLORIDE: 600; 310; 30; 20 INJECTION, SOLUTION INTRAVENOUS at 02:01

## 2025-01-09 RX ADMIN — POTASSIUM CHLORIDE 40 MEQ: 1500 TABLET, EXTENDED RELEASE ORAL at 08:01

## 2025-01-09 RX ADMIN — OXYCODONE HYDROCHLORIDE AND ACETAMINOPHEN 1 TABLET: 5; 325 TABLET ORAL at 04:01

## 2025-01-09 RX ADMIN — PIPERACILLIN SODIUM AND TAZOBACTAM SODIUM 4.5 G: 4; .5 INJECTION, POWDER, LYOPHILIZED, FOR SOLUTION INTRAVENOUS at 11:01

## 2025-01-09 RX ADMIN — SODIUM CHLORIDE, POTASSIUM CHLORIDE, SODIUM LACTATE AND CALCIUM CHLORIDE: 600; 310; 30; 20 INJECTION, SOLUTION INTRAVENOUS at 09:01

## 2025-01-09 NOTE — PROGRESS NOTES
[facility] Surg ([address])  General Surgery  Progress Note    Subjective:     Interval History: Postop day 2 status post incisional hernia repair with small-bowel obstruction.  Preoperative pain and nausea is gone.  No complaints.  Continues with flatus and no bowel movement    Post-Op Info:  Procedure(s) (LRB):  REPAIR, HERNIA, INCISIONAL (N/A)   2 Days Post-Op      Medications:  Continuous Infusions:   lactated ringers   Intravenous Continuous 100 mL/hr at 01/09/25 0950 New Bag at 01/09/25 0950     Scheduled Meds:   piperacillin-tazobactam (Zosyn) IV (PEDS and ADULTS) (extended infusion is not appropriate)  4.5 g Intravenous Q8H    potassium chloride  40 mEq Oral TID     PRN Meds:  Current Facility-Administered Medications:     HYDROmorphone, 0.5 mg, Intravenous, Q6H PRN    ondansetron, 4 mg, Intravenous, Q6H PRN    oxyCODONE-acetaminophen, 1 tablet, Oral, Q4H PRN    sodium chloride 0.9%, 10 mL, Intravenous, PRN     Objective:     Vital Signs (Most Recent):  Temp: 98 °F (36.7 °C) (01/09/25 0829)  Pulse: 75 (01/09/25 1124)  Resp: 18 (01/09/25 0829)  BP: 128/70 (01/09/25 0829)  SpO2: 98 % (01/09/25 0829) Vital Signs (24h Range):  Temp:  [97.8 °F (36.6 °C)-98.8 °F (37.1 °C)] 98 °F (36.7 °C)  Pulse:  [69-85] 75  Resp:  [12-18] 18  SpO2:  [90 %-98 %] 98 %  BP: (121-141)/(67-81) 128/70       Intake/Output Summary (Last 24 hours) at 1/9/2025 1130  Last data filed at 1/9/2025 0933  Gross per 24 hour   Intake 2278.27 ml   Output 470 ml   Net 1808.27 ml       Physical Exam  General brighter, smiling, no apparent distress  Abdomen soft tender only incisions.  Bowel sounds present.  Dark brownish green fluid from peritoneal drain in gallbladder fossa.  Volume not recorded    Significant Labs:  CBC:   Recent Labs   Lab 01/08/25  0331   WBC 13.00*   RBC 4.08   HGB 11.2*   HCT 36.1*   *   MCV 89   MCH 27.5   MCHC 31.0*     CMP:   Recent Labs   Lab 01/08/25 0331 01/09/25  0822   * 91   CALCIUM 8.8 8.9    ALBUMIN 3.1*  --    PROT 6.2  --     139   K 2.6* 2.6*   CO2 30* 32*   CL 99 97   BUN 8 6   CREATININE 0.6 0.7   ALKPHOS 53  --    ALT 34  --    AST 20  --    BILITOT 0.4  --        Significant Diagnostics:  None    Assessment/Plan:     Active Diagnoses:    Diagnosis Date Noted POA    PRINCIPAL PROBLEM:  SBO (small bowel obstruction) [K56.609] 01/07/2025 Yes    Morbid obesity [E66.01] 01/08/2025 Yes    Hyperkalemia [E87.5] 01/08/2025 No      Problems Resolved During this Admission:   Complete bowel obstruction resolving status post incisional hernia repair.  Advance to full liquid diet    Possible bile leak as evidenced by peritoneal drain color.  This is not confirmed.  Volume not recorded but appears to be very low output if present.  We will check LFTs today.  LFTs have been normal thus far.  I will discuss with Gastroenterology, but appears to be having low output and adequate drainage from peritoneal drain in gallbladder fossa.  Continue IV antibiotics      Chirag Acuna MD  General Surgery  [facility] Surg ([address])

## 2025-01-09 NOTE — SUBJECTIVE & OBJECTIVE
Interval History:   Potassium unchanged from yesterday.  She did not get any of the IV potassium because it was discontinued due to burning and only got a one time dose of 40 po.    Will replace more aggressively today and repeat K level in AM.    Review of Systems   All other systems reviewed and are negative.    Objective:     Vital Signs (Most Recent):  Temp: 98 °F (36.7 °C) (01/09/25 0829)  Pulse: 78 (01/09/25 0829)  Resp: 18 (01/09/25 0829)  BP: 128/70 (01/09/25 0829)  SpO2: 98 % (01/09/25 0829) Vital Signs (24h Range):  Temp:  [97.8 °F (36.6 °C)-98.8 °F (37.1 °C)] 98 °F (36.7 °C)  Pulse:  [69-88] 78  Resp:  [12-18] 18  SpO2:  [90 %-98 %] 98 %  BP: (121-142)/(67-81) 128/70     Weight: 108 kg (238 lb 1.6 oz)  Body mass index is 44.99 kg/m².    Intake/Output Summary (Last 24 hours) at 1/9/2025 1034  Last data filed at 1/9/2025 0933  Gross per 24 hour   Intake 2278.27 ml   Output 470 ml   Net 1808.27 ml         Physical Exam  Constitutional:       General: She is not in acute distress.  HENT:      Head: Normocephalic and atraumatic.   Eyes:      Conjunctiva/sclera: Conjunctivae normal.   Cardiovascular:      Rate and Rhythm: Regular rhythm.      Heart sounds: Normal heart sounds. No murmur heard.  Pulmonary:      Effort: Pulmonary effort is normal.      Breath sounds: Normal breath sounds.   Abdominal:      General: Bowel sounds are normal. There is no distension.      Palpations: Abdomen is soft.      Comments: Surgical incision c/d/i   Skin:     General: Skin is warm and dry.      Findings: No rash.             Significant Labs: All pertinent labs within the past 24 hours have been reviewed.  BMP:   Recent Labs   Lab 01/08/25  0331 01/09/25 0822   * 91    139   K 2.6* 2.6*   CL 99 97   CO2 30* 32*   BUN 8 6   CREATININE 0.6 0.7   CALCIUM 8.8 8.9   MG 1.8  --        Significant Imaging: I have reviewed all pertinent imaging results/findings within the past 24 hours.

## 2025-01-09 NOTE — PROGRESS NOTES
[facility] Surg [address] Medicine  Progress Note    Patient Name: Nadir Abernathy  MRN: [id number]  Patient Class: IP- Inpatient   Admission Date: 1/7/2025  Length of Stay: 2 days  Attending Physician: Chirag Acuna MD  Primary Care Provider: Jag Lake MD        Subjective     Principal Problem:SBO (small bowel obstruction)        HPI:  Ms. Abernathy is a 54 year old woman with morbid obesity who presented for evaluation of nausea, vomiting and abdominal pain.  Of note she underwent laparoscopic converted to open cholecystectomy recently and was discharged home with SAMANTHA drain in place.  Since going home she has been unable to keep anything down because of severe nausea and vomiting.  Upon presentation yesterday she was found to have a small bowel obstruction due to an incisional hernia.  She was admitted by Dr. Chirag Acuna and taken to the OR for exploratory laparotomy with lysis of adhesions and incisional hernia repair.  She is passing flatus and trialing clear liquids and was found to have significant hypokalemia this morning.  I have been consulted to provide medical co-management services.  At this time she is feeling better, but still somewhat struggling with pain.  She denies fevers, chills, cough, chest pain, dysuria, numbness, headaches and tingling.    Overview/Hospital Course:  No notes on file    Interval History:   Potassium unchanged from yesterday.  She did not get any of the IV potassium because it was discontinued due to burning and only got a one time dose of 40 po.    Will replace more aggressively today and repeat K level in AM.    Review of Systems   All other systems reviewed and are negative.    Objective:     Vital Signs (Most Recent):  Temp: 98 °F (36.7 °C) (01/09/25 0829)  Pulse: 78 (01/09/25 0829)  Resp: 18 (01/09/25 0829)  BP: 128/70 (01/09/25 0829)  SpO2: 98 % (01/09/25 0829) Vital Signs (24h Range):  Temp:  [97.8 °F (36.6 °C)-98.8 °F (37.1 °C)] 98 °F (36.7  °C)  Pulse:  [69-88] 78  Resp:  [12-18] 18  SpO2:  [90 %-98 %] 98 %  BP: (121-142)/(67-81) 128/70     Weight: 108 kg (238 lb 1.6 oz)  Body mass index is 44.99 kg/m².    Intake/Output Summary (Last 24 hours) at 1/9/2025 1034  Last data filed at 1/9/2025 0933  Gross per 24 hour   Intake 2278.27 ml   Output 470 ml   Net 1808.27 ml         Physical Exam  Constitutional:       General: She is not in acute distress.  HENT:      Head: Normocephalic and atraumatic.   Eyes:      Conjunctiva/sclera: Conjunctivae normal.   Cardiovascular:      Rate and Rhythm: Regular rhythm.      Heart sounds: Normal heart sounds. No murmur heard.  Pulmonary:      Effort: Pulmonary effort is normal.      Breath sounds: Normal breath sounds.   Abdominal:      General: Bowel sounds are normal. There is no distension.      Palpations: Abdomen is soft.      Comments: Surgical incision c/d/i   Skin:     General: Skin is warm and dry.      Findings: No rash.             Significant Labs: All pertinent labs within the past 24 hours have been reviewed.  BMP:   Recent Labs   Lab 01/08/25  0331 01/09/25  0822   * 91    139   K 2.6* 2.6*   CL 99 97   CO2 30* 32*   BUN 8 6   CREATININE 0.6 0.7   CALCIUM 8.8 8.9   MG 1.8  --        Significant Imaging: I have reviewed all pertinent imaging results/findings within the past 24 hours.    Assessment and Plan     * SBO (small bowel obstruction)  -Admitted by Dr. Acuna  -S/p exploratory laparotomy with lysis of adhesions and incisional hernia repair on 1/7  -She remains afebrile and bowel function appears to be returning - is passing flatus and on trial of clear liquids  -Noted stable leukocytosis and addition of zosyn  -Defer routine post-operative management to primary service      Hyperkalemia  -replace  -Repeat labs in AM.    Morbid obesity  Body mass index is 43.9 kg/m². Morbid obesity complicates all aspects of disease management from diagnostic modalities to treatment. Weight loss  encouraged and health benefits explained to patient.      VTE Risk Mitigation (From admission, onward)           Ordered     IP VTE HIGH RISK PATIENT  Once         01/08/25 0016     Place sequential compression device  Until discontinued         01/08/25 0016                    Discharge Planning   ELISABETH: 1/10/2025     Code Status: Full Code   Medical Readiness for Discharge Date:   Discharge Plan A: Home            Will replace  potassium more agressively today and repeat K level in AM.  Mg level nl              Amrik John MD  Department of Hospital Medicine   [facility] ([address])

## 2025-01-09 NOTE — PLAN OF CARE
Problem: Adult Inpatient Plan of Care  Goal: Plan of Care Review  Outcome: Progressing  Goal: Absence of Hospital-Acquired Illness or Injury  Outcome: Progressing  Goal: Optimal Comfort and Wellbeing  Outcome: Progressing     Problem: Bariatric Environmental Safety  Goal: Safety Maintained with Care  Outcome: Progressing     Problem: Wound  Goal: Optimal Coping  Outcome: Progressing  Goal: Improved Oral Intake  Outcome: Progressing  Goal: Optimal Pain Control and Function  Outcome: Progressing  Goal: Skin Health and Integrity  Outcome: Progressing  Goal: Optimal Wound Healing  Outcome: Progressing     Problem: Fall Injury Risk  Goal: Absence of Fall and Fall-Related Injury  Outcome: Progressing     Problem: Pain Acute  Goal: Optimal Pain Control and Function  Outcome: Progressing   POC reviewed with pt who verbalized understanding. AAOx4. VSS on RA. IVF infusing. IV ABX given per MAR. Abd pain treated with PRN meds per MAR. SAMANTHA in place. Up with assist to bathroom. Tele monitored running SR. Resting between care. Remains free of falls and injury. Call light in reach and rounds made for safety.

## 2025-01-09 NOTE — CHAPLAIN
01/09/25 1623   Clinical Encounter Type   Visit Type Initial Visit   Visit Category General Rounding   Visited With Patient and family together;Health care provider   Number of Family Visited 1   Length of Visit 30 Minutes   Patient Spiritual Encounters   Care Provided Compassionate presence;Reflective listening;Assessed zenaida resources   Patient Coping Open/discussion;Accepting   Comments - Patient patient reports no spiritual care needs. When asked she states she is Shinto and has received spiritual support from her own

## 2025-01-09 NOTE — PLAN OF CARE
01/09/25 1403   Rounds   Attendance Provider;;Assigned nurse   Discharge Plan A Home with family   Why the patient remains in the hospital Requires continued medical care   Transition of Care Barriers None

## 2025-01-10 LAB
ALBUMIN SERPL BCP-MCNC: 2.8 G/DL (ref 3.5–5.2)
ALP SERPL-CCNC: 55 U/L (ref 40–150)
ALT SERPL W/O P-5'-P-CCNC: 19 U/L (ref 10–44)
ANION GAP SERPL CALC-SCNC: 10 MMOL/L (ref 8–16)
AST SERPL-CCNC: 13 U/L (ref 10–40)
BILIRUB SERPL-MCNC: 0.4 MG/DL (ref 0.1–1)
BUN SERPL-MCNC: 5 MG/DL (ref 6–20)
CALCIUM SERPL-MCNC: 8.9 MG/DL (ref 8.7–10.5)
CHLORIDE SERPL-SCNC: 101 MMOL/L (ref 95–110)
CO2 SERPL-SCNC: 25 MMOL/L (ref 23–29)
CREAT SERPL-MCNC: 0.7 MG/DL (ref 0.5–1.4)
EST. GFR  (NO RACE VARIABLE): >60 ML/MIN/1.73 M^2
GLUCOSE SERPL-MCNC: 104 MG/DL (ref 70–110)
POTASSIUM SERPL-SCNC: 3.5 MMOL/L (ref 3.5–5.1)
PROT SERPL-MCNC: 6 G/DL (ref 6–8.4)
SODIUM SERPL-SCNC: 136 MMOL/L (ref 136–145)

## 2025-01-10 PROCEDURE — 36415 COLL VENOUS BLD VENIPUNCTURE: CPT | Performed by: SURGERY

## 2025-01-10 PROCEDURE — 21400001 HC TELEMETRY ROOM

## 2025-01-10 PROCEDURE — 25000003 PHARM REV CODE 250: Performed by: SURGERY

## 2025-01-10 PROCEDURE — 25000003 PHARM REV CODE 250: Performed by: INTERNAL MEDICINE

## 2025-01-10 PROCEDURE — 80053 COMPREHEN METABOLIC PANEL: CPT | Performed by: SURGERY

## 2025-01-10 PROCEDURE — 63600175 PHARM REV CODE 636 W HCPCS: Performed by: SURGERY

## 2025-01-10 PROCEDURE — 94761 N-INVAS EAR/PLS OXIMETRY MLT: CPT

## 2025-01-10 RX ADMIN — OXYCODONE HYDROCHLORIDE AND ACETAMINOPHEN 1 TABLET: 5; 325 TABLET ORAL at 03:01

## 2025-01-10 RX ADMIN — Medication 200 ML: at 01:01

## 2025-01-10 RX ADMIN — POTASSIUM CHLORIDE 40 MEQ: 1500 TABLET, EXTENDED RELEASE ORAL at 08:01

## 2025-01-10 RX ADMIN — Medication 200 ML: at 08:01

## 2025-01-10 RX ADMIN — Medication 200 ML: at 04:01

## 2025-01-10 RX ADMIN — PIPERACILLIN SODIUM AND TAZOBACTAM SODIUM 4.5 G: 4; .5 INJECTION, POWDER, LYOPHILIZED, FOR SOLUTION INTRAVENOUS at 03:01

## 2025-01-10 RX ADMIN — Medication 200 ML: at 12:01

## 2025-01-10 RX ADMIN — PIPERACILLIN SODIUM AND TAZOBACTAM SODIUM 4.5 G: 4; .5 INJECTION, POWDER, LYOPHILIZED, FOR SOLUTION INTRAVENOUS at 08:01

## 2025-01-10 RX ADMIN — Medication 200 ML: at 11:01

## 2025-01-10 RX ADMIN — OXYCODONE HYDROCHLORIDE AND ACETAMINOPHEN 1 TABLET: 5; 325 TABLET ORAL at 08:01

## 2025-01-10 RX ADMIN — PIPERACILLIN SODIUM AND TAZOBACTAM SODIUM 4.5 G: 4; .5 INJECTION, POWDER, LYOPHILIZED, FOR SOLUTION INTRAVENOUS at 01:01

## 2025-01-10 RX ADMIN — OXYCODONE HYDROCHLORIDE AND ACETAMINOPHEN 1 TABLET: 5; 325 TABLET ORAL at 01:01

## 2025-01-10 NOTE — PLAN OF CARE
01/10/25 1407   Discharge Reassessment   Assessment Type Discharge Planning Reassessment   Did the patient's condition or plan change since previous assessment? Yes   Discharge Plan discussed with: Patient   Discharge Plan A Home with family   Discharge Plan B Home Health;Home with family   DME Needed Upon Discharge  none   Transition of Care Barriers None   Why the patient remains in the hospital Requires continued medical care   Post-Acute Status   Discharge Delays None known at this time

## 2025-01-10 NOTE — PLAN OF CARE
Problem: Adult Inpatient Plan of Care  Goal: Plan of Care Review  Outcome: Progressing  Goal: Absence of Hospital-Acquired Illness or Injury  Outcome: Progressing  Goal: Optimal Comfort and Wellbeing  Outcome: Progressing     Problem: Bariatric Environmental Safety  Goal: Safety Maintained with Care  Outcome: Progressing     Problem: Wound  Goal: Absence of Infection Signs and Symptoms  Outcome: Progressing  Goal: Improved Oral Intake  Outcome: Progressing  Goal: Optimal Pain Control and Function  Outcome: Progressing  Goal: Skin Health and Integrity  Outcome: Progressing  Goal: Optimal Wound Healing  Outcome: Progressing     Problem: Fall Injury Risk  Goal: Absence of Fall and Fall-Related Injury  Outcome: Progressing     Problem: Pain Acute  Goal: Optimal Pain Control and Function  Outcome: Progressing   POC reviewed with pt who verbalized understanding. AAOx4. VSS on RA. IVF in fusing. IV ABX given per MAR. Abd pain treated with PRN meds per MAR. SAMANTHA in place. Up with assist to bathroom. Tele monitored running SR. Resting between care. Remains free of falls and injury. Call light in reach and rounds made for safety.

## 2025-01-10 NOTE — PROGRESS NOTES
[facility] Surg ([address])  General Surgery  Progress Note    Subjective:     Interval History: Postop day 3 status post incisional hernia repair with small-bowel obstruction.  Preoperative pain and nausea is gone.  No complaints.  Continues with flatus and had bowel movement.    Post-Op Info:  Procedure(s) (LRB):  REPAIR, HERNIA, INCISIONAL (N/A)   3 Days Post-Op      Medications:  Continuous Infusions:   lactated ringers   Intravenous Continuous   Paused at 01/10/25 0349     Scheduled Meds:   electrolytes-dextrose  200 mL Oral Q4H    piperacillin-tazobactam (Zosyn) IV (PEDS and ADULTS) (extended infusion is not appropriate)  4.5 g Intravenous Q8H     PRN Meds:  Current Facility-Administered Medications:     HYDROmorphone, 0.5 mg, Intravenous, Q6H PRN    ondansetron, 4 mg, Intravenous, Q6H PRN    oxyCODONE-acetaminophen, 1 tablet, Oral, Q4H PRN    sodium chloride 0.9%, 10 mL, Intravenous, PRN     Objective:     Vital Signs (Most Recent):  Temp: 98.4 °F (36.9 °C) (01/10/25 1701)  Pulse: 78 (01/10/25 1701)  Resp: 14 (01/10/25 1701)  BP: 139/75 (01/10/25 1701)  SpO2: 100 % (01/10/25 1701) Vital Signs (24h Range):  Temp:  [97.2 °F (36.2 °C)-98.5 °F (36.9 °C)] 98.4 °F (36.9 °C)  Pulse:  [68-89] 78  Resp:  [14-20] 14  SpO2:  [94 %-100 %] 100 %  BP: (122-178)/(64-87) 139/75       Intake/Output Summary (Last 24 hours) at 1/10/2025 1725  Last data filed at 1/10/2025 1200  Gross per 24 hour   Intake 1674.39 ml   Output 1307.5 ml   Net 366.89 ml       Physical Exam  General no apparent distress, smiling appears very comfortable  Abdomen is soft nontender nondistended.    Still with low output bile colored fluid from SAMANTHA drain  Significant Labs:  CMP:   Recent Labs   Lab 01/10/25  0514      CALCIUM 8.9   ALBUMIN 2.8*   PROT 6.0      K 3.5   CO2 25      BUN 5*   CREATININE 0.7   ALKPHOS 55   ALT 19   AST 13   BILITOT 0.4       Significant Diagnostics:  None    Assessment/Plan:     Active Diagnoses:     Diagnosis Date Noted POA    PRINCIPAL PROBLEM:  SBO (small bowel obstruction) [K56.609] 01/07/2025 Yes    Morbid obesity [E66.01] 01/08/2025 Yes    Hyperkalemia [E87.5] 01/08/2025 No      Problems Resolved During this Admission:   All is improving other than she may have a bile leak.  We will try to confirm with HIDA scan.  Not sure if it will show as it is very low output.       Chirag Acuna MD  General Surgery  [facility] Surg ([address])

## 2025-01-10 NOTE — PROGRESS NOTES
[facility] Surg [address] Medicine  Progress Note    Patient Name: Nadir Abernathy  MRN: [id number]  Patient Class: IP- Inpatient   Admission Date: 1/7/2025  Length of Stay: 3 days  Attending Physician: Chirag Acuna MD  Primary Care Provider: Jag Lake MD        Subjective     Principal Problem:SBO (small bowel obstruction)        HPI:  Ms. Abernathy is a 54 year old woman with morbid obesity who presented for evaluation of nausea, vomiting and abdominal pain.  Of note she underwent laparoscopic converted to open cholecystectomy recently and was discharged home with SAMANTHA drain in place.  Since going home she has been unable to keep anything down because of severe nausea and vomiting.  Upon presentation yesterday she was found to have a small bowel obstruction due to an incisional hernia.  She was admitted by Dr. Chirag Acuna and taken to the OR for exploratory laparotomy with lysis of adhesions and incisional hernia repair.  She is passing flatus and trialing clear liquids and was found to have significant hypokalemia this morning.  I have been consulted to provide medical co-management services.  At this time she is feeling better, but still somewhat struggling with pain.  She denies fevers, chills, cough, chest pain, dysuria, numbness, headaches and tingling.    Overview/Hospital Course:  No notes on file    Interval History:   Potassium normal today  Tolerating PO  Some bilious appearing drainage from SAMANTHA drain  Discussed with Dr Acuna - concern for bile leak    Today's plan:  HIDA scan    Review of Systems   All other systems reviewed and are negative.    Objective:     Vital Signs (Most Recent):  Temp: 98.1 °F (36.7 °C) (01/10/25 0718)  Pulse: 75 (01/10/25 0718)  Resp: 14 (01/10/25 0718)  BP: (!) 146/79 (01/10/25 0718)  SpO2: 96 % (01/10/25 0718) Vital Signs (24h Range):  Temp:  [97.2 °F (36.2 °C)-98.5 °F (36.9 °C)] 98.1 °F (36.7 °C)  Pulse:  [68-89] 75  Resp:  [12-18] 14  SpO2:  [94  %-100 %] 96 %  BP: (121-146)/(64-85) 146/79     Weight: 108 kg (238 lb 1.6 oz)  Body mass index is 44.99 kg/m².    Intake/Output Summary (Last 24 hours) at 1/10/2025 1002  Last data filed at 1/10/2025 0800  Gross per 24 hour   Intake 3180.84 ml   Output 1182.5 ml   Net 1998.34 ml         Physical Exam  Constitutional:       General: She is not in acute distress.  HENT:      Head: Normocephalic and atraumatic.   Eyes:      Conjunctiva/sclera: Conjunctivae normal.   Cardiovascular:      Rate and Rhythm: Regular rhythm.      Heart sounds: Normal heart sounds. No murmur heard.  Pulmonary:      Effort: Pulmonary effort is normal.      Breath sounds: Normal breath sounds.   Abdominal:      General: Bowel sounds are normal. There is no distension.      Palpations: Abdomen is soft.      Comments: Surgical incision c/d/i   Skin:     General: Skin is warm and dry.      Findings: No rash.             Significant Labs: All pertinent labs within the past 24 hours have been reviewed.  BMP:   Recent Labs   Lab 01/10/25  0514         K 3.5      CO2 25   BUN 5*   CREATININE 0.7   CALCIUM 8.9       Significant Imaging: I have reviewed all pertinent imaging results/findings within the past 24 hours.    Assessment and Plan     * SBO (small bowel obstruction)  -Admitted by Dr. Acuna  -S/p exploratory laparotomy with lysis of adhesions and incisional hernia repair on 1/7  -She remains afebrile and bowel function appears to be returning - is passing flatus and on trial of clear liquids  -Noted stable leukocytosis and addition of zosyn  -Defer routine post-operative management to primary service      Hyperkalemia  -replace  -Repeat labs in AM.    Morbid obesity  Body mass index is 43.9 kg/m². Morbid obesity complicates all aspects of disease management from diagnostic modalities to treatment. Weight loss encouraged and health benefits explained to patient.      VTE Risk Mitigation (From admission, onward)            Ordered     IP VTE HIGH RISK PATIENT  Once         01/08/25 0016     Place sequential compression device  Until discontinued         01/08/25 0016                    Discharge Planning   ELISABETH: 1/10/2025     Code Status: Full Code   Medical Readiness for Discharge Date:   Discharge Plan A: Home with family                      Amrik John MD  Department of Hospital Medicine   [facility] ([address])

## 2025-01-10 NOTE — SUBJECTIVE & OBJECTIVE
Interval History:   Potassium normal today  Tolerating PO    Review of Systems   All other systems reviewed and are negative.    Objective:     Vital Signs (Most Recent):  Temp: 98.1 °F (36.7 °C) (01/10/25 0718)  Pulse: 75 (01/10/25 0718)  Resp: 14 (01/10/25 0718)  BP: (!) 146/79 (01/10/25 0718)  SpO2: 96 % (01/10/25 0718) Vital Signs (24h Range):  Temp:  [97.2 °F (36.2 °C)-98.5 °F (36.9 °C)] 98.1 °F (36.7 °C)  Pulse:  [68-89] 75  Resp:  [12-18] 14  SpO2:  [94 %-100 %] 96 %  BP: (121-146)/(64-85) 146/79     Weight: 108 kg (238 lb 1.6 oz)  Body mass index is 44.99 kg/m².    Intake/Output Summary (Last 24 hours) at 1/10/2025 1002  Last data filed at 1/10/2025 0800  Gross per 24 hour   Intake 3180.84 ml   Output 1182.5 ml   Net 1998.34 ml         Physical Exam  Constitutional:       General: She is not in acute distress.  HENT:      Head: Normocephalic and atraumatic.   Eyes:      Conjunctiva/sclera: Conjunctivae normal.   Cardiovascular:      Rate and Rhythm: Regular rhythm.      Heart sounds: Normal heart sounds. No murmur heard.  Pulmonary:      Effort: Pulmonary effort is normal.      Breath sounds: Normal breath sounds.   Abdominal:      General: Bowel sounds are normal. There is no distension.      Palpations: Abdomen is soft.      Comments: Surgical incision c/d/i   Skin:     General: Skin is warm and dry.      Findings: No rash.             Significant Labs: All pertinent labs within the past 24 hours have been reviewed.  BMP:   Recent Labs   Lab 01/10/25  0514         K 3.5      CO2 25   BUN 5*   CREATININE 0.7   CALCIUM 8.9       Significant Imaging: I have reviewed all pertinent imaging results/findings within the past 24 hours.

## 2025-01-11 PROBLEM — E87.5 HYPERKALEMIA: Status: RESOLVED | Noted: 2025-01-08 | Resolved: 2025-01-11

## 2025-01-11 PROCEDURE — 21400001 HC TELEMETRY ROOM

## 2025-01-11 PROCEDURE — 25000003 PHARM REV CODE 250: Performed by: SURGERY

## 2025-01-11 PROCEDURE — 99232 SBSQ HOSP IP/OBS MODERATE 35: CPT | Mod: 24,,, | Performed by: SURGERY

## 2025-01-11 PROCEDURE — 63600175 PHARM REV CODE 636 W HCPCS: Performed by: SURGERY

## 2025-01-11 PROCEDURE — 94761 N-INVAS EAR/PLS OXIMETRY MLT: CPT

## 2025-01-11 RX ADMIN — PIPERACILLIN SODIUM AND TAZOBACTAM SODIUM 4.5 G: 4; .5 INJECTION, POWDER, LYOPHILIZED, FOR SOLUTION INTRAVENOUS at 04:01

## 2025-01-11 RX ADMIN — Medication 200 ML: at 05:01

## 2025-01-11 RX ADMIN — Medication 200 ML: at 11:01

## 2025-01-11 RX ADMIN — Medication 200 ML: at 04:01

## 2025-01-11 RX ADMIN — PIPERACILLIN SODIUM AND TAZOBACTAM SODIUM 4.5 G: 4; .5 INJECTION, POWDER, LYOPHILIZED, FOR SOLUTION INTRAVENOUS at 08:01

## 2025-01-11 RX ADMIN — Medication 200 ML: at 12:01

## 2025-01-11 RX ADMIN — PIPERACILLIN SODIUM AND TAZOBACTAM SODIUM 4.5 G: 4; .5 INJECTION, POWDER, LYOPHILIZED, FOR SOLUTION INTRAVENOUS at 12:01

## 2025-01-11 RX ADMIN — OXYCODONE HYDROCHLORIDE AND ACETAMINOPHEN 1 TABLET: 5; 325 TABLET ORAL at 09:01

## 2025-01-11 RX ADMIN — Medication 200 ML: at 08:01

## 2025-01-11 NOTE — ASSESSMENT & PLAN NOTE
Status post exploratory laparotomy with lysis of adhesions and incisional hernia repair on 1/7/2025.  Discussed general surgery home recommended holding off on HIDA scan for now and attempt to advance diet.  Continue supportive care.  Repeat labs tomorrow.

## 2025-01-11 NOTE — HOSPITAL COURSE
Patient is a 54 year-old woman with morbid obesity status post cholecystectomy 12/30/24 who presented for evaluation of nausea and vomiting.  She was found to have small-bowel obstruction due to incisional hernia.  She was admitted by Dr. CHIO Acuna and taken for exploratory laparotomy with lysis of adhesions and incisional hernia repair.  Concerned about possible biliary leak HIDA scan ordered.  HIDA scan was not able to be performed yesterday not available over the weekend.  Discuss with General surgery.  Recommended holding off on HIDA scan and attempt to advance diet at this time.

## 2025-01-11 NOTE — PROGRESS NOTES
[facility] - [facility] ([address])  General Surgery  Progress Note    Subjective:     History of Present Illness:  No notes on file    Post-Op Info:  Procedure(s) (LRB):  REPAIR, HERNIA, INCISIONAL (N/A)   4 Days Post-Op     Interval History:  54-year-old female status post robotic cholecystectomy converted to open with postoperative bowel obstruction.    Patient had bilious output which had increased several days ago.        Medications:  Continuous Infusions:   lactated ringers   Intravenous Continuous 75 mL/hr at 01/10/25 1831 Rate Verify at 01/10/25 1831     Scheduled Meds:   electrolytes-dextrose  200 mL Oral Q4H    piperacillin-tazobactam (Zosyn) IV (PEDS and ADULTS) (extended infusion is not appropriate)  4.5 g Intravenous Q8H     PRN Meds:  Current Facility-Administered Medications:     HYDROmorphone, 0.5 mg, Intravenous, Q6H PRN    ondansetron, 4 mg, Intravenous, Q6H PRN    oxyCODONE-acetaminophen, 1 tablet, Oral, Q4H PRN    sodium chloride 0.9%, 10 mL, Intravenous, PRN     Review of patient's allergies indicates:   Allergen Reactions    Shellfish containing products Anaphylaxis     Objective:     Vital Signs (Most Recent):  Temp: 98.3 °F (36.8 °C) (01/11/25 1223)  Pulse: 72 (01/11/25 1223)  Resp: 17 (01/11/25 1223)  BP: 132/74 (01/11/25 1223)  SpO2: 100 % (01/11/25 1223) Vital Signs (24h Range):  Temp:  [98 °F (36.7 °C)-98.5 °F (36.9 °C)] 98.3 °F (36.8 °C)  Pulse:  [68-97] 72  Resp:  [14-18] 17  SpO2:  [93 %-100 %] 100 %  BP: (130-141)/(70-88) 132/74     Weight: 108 kg (238 lb 1.6 oz)  Body mass index is 44.99 kg/m².    Intake/Output - Last 3 Shifts         01/09 0700  01/10 0659 01/10 0700 01/11 0659 01/11 0700 01/12 0659    P.O. 1420 560 0    I.V. (mL/kg) 1530 (14.2) 579.2 (5.4)     Other 0      IV Piggyback 260.8 195.2     Total Intake(mL/kg) 3210.8 (29.7) 1334.5 (12.4) 0 (0)    Urine (mL/kg/hr) 1150 (0.4) 2100 (0.8) 400 (0.5)    Emesis/NG output 0 0     Drains 32.5 10 5    Other 0 0     Stool 0 0      Blood 0 0     Total Output 1182.5 2110 405    Net +2028.3 -775.5 -405           Urine Occurrence 2 x 0 x     Stool Occurrence 0 x 0 x     Emesis Occurrence 0 x 0 x              Physical Exam  Vitals and nursing note reviewed.   Constitutional:       Appearance: She is well-developed.   HENT:      Head: Normocephalic and atraumatic.   Cardiovascular:      Rate and Rhythm: Normal rate.      Heart sounds: Normal heart sounds.   Pulmonary:      Effort: Pulmonary effort is normal.   Abdominal:      General: Bowel sounds are normal. There is no distension.      Palpations: Abdomen is soft.      Tenderness: There is no abdominal tenderness.      Comments: SAMANTHA drain output decreased, 5-10 cc this morning, bile tinged   Musculoskeletal:         General: Normal range of motion.      Cervical back: Normal range of motion.   Skin:     General: Skin is warm and dry.      Capillary Refill: Capillary refill takes less than 2 seconds.   Neurological:      Mental Status: She is alert and oriented to person, place, and time.   Psychiatric:         Behavior: Behavior normal.          Significant Labs:  I have reviewed all pertinent lab results within the past 24 hours.  CBC:   Recent Labs   Lab 01/08/25  0331   WBC 13.00*   RBC 4.08   HGB 11.2*   HCT 36.1*   *   MCV 89   MCH 27.5   MCHC 31.0*     CMP:   Recent Labs   Lab 01/10/25  0514      CALCIUM 8.9   ALBUMIN 2.8*   PROT 6.0      K 3.5   CO2 25      BUN 5*   CREATININE 0.7   ALKPHOS 55   ALT 19   AST 13   BILITOT 0.4       Significant Diagnostics:  I have reviewed all pertinent imaging results/findings within the past 24 hours.  Assessment/Plan:     * SBO (small bowel obstruction)  Resolved   Some concern for possible bile leak however SAMANTHA drain output has decreased significantly.    We will attempt to diet today and see I put stasis same.    If no increase in output likely would keep drain in place and hold off on HIDA scan.    If drain output increases  with diet HIDA scan to be considered.            Dilip Sims MD  General Surgery  [facility] ([address])

## 2025-01-11 NOTE — SUBJECTIVE & OBJECTIVE
Interval History: Reports feeling better.  Output from percutaneous drain decreased.    Review of Systems   Constitutional:  Negative for chills and fever.   Respiratory:  Negative for shortness of breath.    Cardiovascular:  Negative for chest pain.   Gastrointestinal:  Positive for abdominal pain. Negative for nausea and vomiting.   Genitourinary:  Negative for dysuria and frequency.     Objective:     Vital Signs (Most Recent):  Temp: 98.3 °F (36.8 °C) (01/11/25 1639)  Pulse: 96 (01/11/25 1639)  Resp: 18 (01/11/25 1639)  BP: 134/72 (01/11/25 1639)  SpO2: 100 % (01/11/25 1639) Vital Signs (24h Range):  Temp:  [98 °F (36.7 °C)-98.5 °F (36.9 °C)] 98.3 °F (36.8 °C)  Pulse:  [68-97] 96  Resp:  [16-18] 18  SpO2:  [93 %-100 %] 100 %  BP: (130-141)/(70-88) 134/72     Weight: 108 kg (238 lb 1.6 oz)  Body mass index is 44.99 kg/m².    Intake/Output Summary (Last 24 hours) at 1/11/2025 1738  Last data filed at 1/11/2025 1018  Gross per 24 hour   Intake 1094.46 ml   Output 1310 ml   Net -215.54 ml         Physical Exam  Constitutional:       General: She is not in acute distress.  HENT:      Head: Normocephalic and atraumatic.   Eyes:      Conjunctiva/sclera: Conjunctivae normal.   Cardiovascular:      Rate and Rhythm: Regular rhythm.      Heart sounds: Normal heart sounds. No murmur heard.  Pulmonary:      Effort: Pulmonary effort is normal.      Breath sounds: Normal breath sounds.   Abdominal:      General: Bowel sounds are normal. There is no distension.      Palpations: Abdomen is soft.      Comments: Surgical incision clean, dry, intact.  SAMANTHA drain with minimal output.   Skin:     General: Skin is warm and dry.      Findings: No rash.             Significant Labs: All pertinent labs within the past 24 hours have been reviewed.    Significant Imaging: I have reviewed all pertinent imaging results/findings within the past 24 hours.

## 2025-01-11 NOTE — SUBJECTIVE & OBJECTIVE
Interval History:  54-year-old female status post robotic cholecystectomy converted to open with postoperative bowel obstruction.    Patient had bilious output which had increased several days ago.        Medications:  Continuous Infusions:   lactated ringers   Intravenous Continuous 75 mL/hr at 01/10/25 1831 Rate Verify at 01/10/25 1831     Scheduled Meds:   electrolytes-dextrose  200 mL Oral Q4H    piperacillin-tazobactam (Zosyn) IV (PEDS and ADULTS) (extended infusion is not appropriate)  4.5 g Intravenous Q8H     PRN Meds:  Current Facility-Administered Medications:     HYDROmorphone, 0.5 mg, Intravenous, Q6H PRN    ondansetron, 4 mg, Intravenous, Q6H PRN    oxyCODONE-acetaminophen, 1 tablet, Oral, Q4H PRN    sodium chloride 0.9%, 10 mL, Intravenous, PRN     Review of patient's allergies indicates:   Allergen Reactions    Shellfish containing products Anaphylaxis     Objective:     Vital Signs (Most Recent):  Temp: 98.3 °F (36.8 °C) (01/11/25 1223)  Pulse: 72 (01/11/25 1223)  Resp: 17 (01/11/25 1223)  BP: 132/74 (01/11/25 1223)  SpO2: 100 % (01/11/25 1223) Vital Signs (24h Range):  Temp:  [98 °F (36.7 °C)-98.5 °F (36.9 °C)] 98.3 °F (36.8 °C)  Pulse:  [68-97] 72  Resp:  [14-18] 17  SpO2:  [93 %-100 %] 100 %  BP: (130-141)/(70-88) 132/74     Weight: 108 kg (238 lb 1.6 oz)  Body mass index is 44.99 kg/m².    Intake/Output - Last 3 Shifts         01/09 0700  01/10 0659 01/10 0700  01/11 0659 01/11 0700 01/12 0659    P.O. 1420 560 0    I.V. (mL/kg) 1530 (14.2) 579.2 (5.4)     Other 0      IV Piggyback 260.8 195.2     Total Intake(mL/kg) 3210.8 (29.7) 1334.5 (12.4) 0 (0)    Urine (mL/kg/hr) 1150 (0.4) 2100 (0.8) 400 (0.5)    Emesis/NG output 0 0     Drains 32.5 10 5    Other 0 0     Stool 0 0     Blood 0 0     Total Output 1182.5 2110 405    Net +2028.3 -775.5 -405           Urine Occurrence 2 x 0 x     Stool Occurrence 0 x 0 x     Emesis Occurrence 0 x 0 x              Physical Exam  Vitals and nursing note  reviewed.   Constitutional:       Appearance: She is well-developed.   HENT:      Head: Normocephalic and atraumatic.   Cardiovascular:      Rate and Rhythm: Normal rate.      Heart sounds: Normal heart sounds.   Pulmonary:      Effort: Pulmonary effort is normal.   Abdominal:      General: Bowel sounds are normal. There is no distension.      Palpations: Abdomen is soft.      Tenderness: There is no abdominal tenderness.      Comments: SAMANTHA drain output decreased, 5-10 cc this morning, bile tinged   Musculoskeletal:         General: Normal range of motion.      Cervical back: Normal range of motion.   Skin:     General: Skin is warm and dry.      Capillary Refill: Capillary refill takes less than 2 seconds.   Neurological:      Mental Status: She is alert and oriented to person, place, and time.   Psychiatric:         Behavior: Behavior normal.          Significant Labs:  I have reviewed all pertinent lab results within the past 24 hours.  CBC:   Recent Labs   Lab 01/08/25  0331   WBC 13.00*   RBC 4.08   HGB 11.2*   HCT 36.1*   *   MCV 89   MCH 27.5   MCHC 31.0*     CMP:   Recent Labs   Lab 01/10/25  0514      CALCIUM 8.9   ALBUMIN 2.8*   PROT 6.0      K 3.5   CO2 25      BUN 5*   CREATININE 0.7   ALKPHOS 55   ALT 19   AST 13   BILITOT 0.4       Significant Diagnostics:  I have reviewed all pertinent imaging results/findings within the past 24 hours.

## 2025-01-11 NOTE — PROGRESS NOTES
"[facility] [address] Medicine  Progress Note    Patient Name: Nadir Abernathy  MRN: [id number]  Patient Class: IP- Inpatient   Admission Date: 1/7/2025  Length of Stay: 4 days  Attending Physician: Chirag Acuna MD  Primary Care Provider: Jag Lake MD          Principal Problem:Small bowel obstruction        HPI:  As per Chirag Diaz MD:    "Ms. Abernathy is a 54 year-old woman with morbid obesity who presented for evaluation of nausea, vomiting and abdominal pain.  Of note she underwent laparoscopic converted to open cholecystectomy recently and was discharged home with SAMANTHA drain in place.  Since going home she has been unable to keep anything down because of severe nausea and vomiting.  Upon presentation yesterday she was found to have a small bowel obstruction due to an incisional hernia.  She was admitted by Dr. Chirag Acuna and taken to the OR for exploratory laparotomy with lysis of adhesions and incisional hernia repair.  She is passing flatus and trialing clear liquids and was found to have significant hypokalemia this morning.  I have been consulted to provide medical co-management services.  At this time she is feeling better, but still somewhat struggling with pain.  She denies fevers, chills, cough, chest pain, dysuria, numbness, headaches and tingling."    Overview/Hospital Course:  Patient is a 54 year-old woman with morbid obesity status post cholecystectomy 12/30/24 who presented for evaluation of nausea and vomiting.  She was found to have small-bowel obstruction due to incisional hernia.  She was admitted by Dr. CHIO Acuna and taken for exploratory laparotomy with lysis of adhesions and incisional hernia repair.  Concerned about possible biliary leak HIDA scan ordered.  HIDA scan was not able to be performed yesterday not available over the weekend.  Discuss with General surgery.  Recommended holding off on HIDA scan and attempt to advance diet at this " time.    Interval History: Reports feeling better.  Output from percutaneous drain decreased.    Review of Systems   Constitutional:  Negative for chills and fever.   Respiratory:  Negative for shortness of breath.    Cardiovascular:  Negative for chest pain.   Gastrointestinal:  Positive for abdominal pain. Negative for nausea and vomiting.   Genitourinary:  Negative for dysuria and frequency.     Objective:     Vital Signs (Most Recent):  Temp: 98.3 °F (36.8 °C) (01/11/25 1639)  Pulse: 96 (01/11/25 1639)  Resp: 18 (01/11/25 1639)  BP: 134/72 (01/11/25 1639)  SpO2: 100 % (01/11/25 1639) Vital Signs (24h Range):  Temp:  [98 °F (36.7 °C)-98.5 °F (36.9 °C)] 98.3 °F (36.8 °C)  Pulse:  [68-97] 96  Resp:  [16-18] 18  SpO2:  [93 %-100 %] 100 %  BP: (130-141)/(70-88) 134/72     Weight: 108 kg (238 lb 1.6 oz)  Body mass index is 44.99 kg/m².    Intake/Output Summary (Last 24 hours) at 1/11/2025 1738  Last data filed at 1/11/2025 1018  Gross per 24 hour   Intake 1094.46 ml   Output 1310 ml   Net -215.54 ml         Physical Exam  Constitutional:       General: She is not in acute distress.  HENT:      Head: Normocephalic and atraumatic.   Eyes:      Conjunctiva/sclera: Conjunctivae normal.   Cardiovascular:      Rate and Rhythm: Regular rhythm.      Heart sounds: Normal heart sounds. No murmur heard.  Pulmonary:      Effort: Pulmonary effort is normal.      Breath sounds: Normal breath sounds.   Abdominal:      General: Bowel sounds are normal. There is no distension.      Palpations: Abdomen is soft.      Comments: Surgical incision clean, dry, intact.  SAMANTHA drain with minimal output.   Skin:     General: Skin is warm and dry.      Findings: No rash.             Significant Labs: All pertinent labs within the past 24 hours have been reviewed.    Significant Imaging: I have reviewed all pertinent imaging results/findings within the past 24 hours.    Assessment and Plan     * Small bowel obstruction  Status post exploratory  laparotomy with lysis of adhesions and incisional hernia repair on 1/7/2025.  Discussed general surgery home recommended holding off on HIDA scan for now and attempt to advance diet.  Continue supportive care.  Repeat labs tomorrow.    Morbid obesity  Body mass index is 43.9 kg/m². Morbid obesity complicates all aspects of disease management from diagnostic modalities to treatment. Weight loss encouraged and health benefits explained to patient.      VTE Risk Mitigation (From admission, onward)           Ordered     IP VTE HIGH RISK PATIENT  Once         01/08/25 0016     Place sequential compression device  Until discontinued         01/08/25 0016                  Nelson Escobar MD  Department of Hospital Medicine   [facility] - Med [facility]urg ([address])

## 2025-01-11 NOTE — PLAN OF CARE
Problem: Adult Inpatient Plan of Care  Goal: Plan of Care Review  Outcome: Progressing  Flowsheets (Taken 1/10/2025 2140)  Plan of Care Reviewed With: patient     Problem: Fall Injury Risk  Goal: Absence of Fall and Fall-Related Injury  Outcome: Progressing  Intervention: Promote Injury-Free Environment  Flowsheets (Taken 1/10/2025 2140)  Safety Promotion/Fall Prevention:   bed alarm set   Fall Risk reviewed with patient/family   medications reviewed   side rails raised x 2   patient expresses understanding of fall risk and prevention     Problem: Pain Acute  Goal: Optimal Pain Control and Function  Outcome: Progressing  Intervention: Prevent or Manage Pain  Flowsheets (Taken 1/10/2025 2140)  Sleep/Rest Enhancement:   awakenings minimized   room darkened   relaxation techniques promoted  Sensory Stimulation Regulation:   quiet environment promoted   care clustered   lighting decreased  Bowel Elimination Promotion: ambulation promoted  Medication Review/Management: medications reviewed

## 2025-01-11 NOTE — ASSESSMENT & PLAN NOTE
Resolved   Some concern for possible bile leak however SAMANTHA drain output has decreased significantly.    We will attempt to diet today and see I put stasis same.    If no increase in output likely would keep drain in place and hold off on HIDA scan.    If drain output increases with diet HIDA scan to be considered.

## 2025-01-12 LAB
ALBUMIN SERPL BCP-MCNC: 3 G/DL (ref 3.5–5.2)
ALP SERPL-CCNC: 64 U/L (ref 40–150)
ALT SERPL W/O P-5'-P-CCNC: 34 U/L (ref 10–44)
ANION GAP SERPL CALC-SCNC: 11 MMOL/L (ref 8–16)
AST SERPL-CCNC: 34 U/L (ref 10–40)
BASOPHILS # BLD AUTO: 0.06 K/UL (ref 0–0.2)
BASOPHILS NFR BLD: 0.5 % (ref 0–1.9)
BILIRUB SERPL-MCNC: 0.3 MG/DL (ref 0.1–1)
BUN SERPL-MCNC: 5 MG/DL (ref 6–20)
CALCIUM SERPL-MCNC: 8.9 MG/DL (ref 8.7–10.5)
CHLORIDE SERPL-SCNC: 107 MMOL/L (ref 95–110)
CO2 SERPL-SCNC: 20 MMOL/L (ref 23–29)
CREAT SERPL-MCNC: 0.7 MG/DL (ref 0.5–1.4)
DIFFERENTIAL METHOD BLD: ABNORMAL
EOSINOPHIL # BLD AUTO: 0.3 K/UL (ref 0–0.5)
EOSINOPHIL NFR BLD: 3.1 % (ref 0–8)
ERYTHROCYTE [DISTWIDTH] IN BLOOD BY AUTOMATED COUNT: 13 % (ref 11.5–14.5)
EST. GFR  (NO RACE VARIABLE): >60 ML/MIN/1.73 M^2
GLUCOSE SERPL-MCNC: 93 MG/DL (ref 70–110)
HCT VFR BLD AUTO: 36.8 % (ref 37–48.5)
HGB BLD-MCNC: 11.8 G/DL (ref 12–16)
IMM GRANULOCYTES # BLD AUTO: 0.11 K/UL (ref 0–0.04)
IMM GRANULOCYTES NFR BLD AUTO: 1 % (ref 0–0.5)
LYMPHOCYTES # BLD AUTO: 2.7 K/UL (ref 1–4.8)
LYMPHOCYTES NFR BLD: 24.5 % (ref 18–48)
MAGNESIUM SERPL-MCNC: 1.8 MG/DL (ref 1.6–2.6)
MCH RBC QN AUTO: 28.1 PG (ref 27–31)
MCHC RBC AUTO-ENTMCNC: 32.1 G/DL (ref 32–36)
MCV RBC AUTO: 88 FL (ref 82–98)
MONOCYTES # BLD AUTO: 0.7 K/UL (ref 0.3–1)
MONOCYTES NFR BLD: 6.2 % (ref 4–15)
NEUTROPHILS # BLD AUTO: 7.2 K/UL (ref 1.8–7.7)
NEUTROPHILS NFR BLD: 64.7 % (ref 38–73)
NRBC BLD-RTO: 0 /100 WBC
PHOSPHATE SERPL-MCNC: 3.3 MG/DL (ref 2.7–4.5)
PLATELET # BLD AUTO: 510 K/UL (ref 150–450)
PMV BLD AUTO: 8.5 FL (ref 9.2–12.9)
POTASSIUM SERPL-SCNC: 3.7 MMOL/L (ref 3.5–5.1)
PROT SERPL-MCNC: 6.4 G/DL (ref 6–8.4)
RBC # BLD AUTO: 4.2 M/UL (ref 4–5.4)
SODIUM SERPL-SCNC: 138 MMOL/L (ref 136–145)
WBC # BLD AUTO: 11.08 K/UL (ref 3.9–12.7)

## 2025-01-12 PROCEDURE — 63600175 PHARM REV CODE 636 W HCPCS: Performed by: SURGERY

## 2025-01-12 PROCEDURE — 83735 ASSAY OF MAGNESIUM: CPT | Performed by: HOSPITALIST

## 2025-01-12 PROCEDURE — 80053 COMPREHEN METABOLIC PANEL: CPT | Performed by: HOSPITALIST

## 2025-01-12 PROCEDURE — 84100 ASSAY OF PHOSPHORUS: CPT | Performed by: HOSPITALIST

## 2025-01-12 PROCEDURE — 21400001 HC TELEMETRY ROOM

## 2025-01-12 PROCEDURE — 36415 COLL VENOUS BLD VENIPUNCTURE: CPT | Performed by: HOSPITALIST

## 2025-01-12 PROCEDURE — 99232 SBSQ HOSP IP/OBS MODERATE 35: CPT | Mod: 24,,, | Performed by: SURGERY

## 2025-01-12 PROCEDURE — 94761 N-INVAS EAR/PLS OXIMETRY MLT: CPT

## 2025-01-12 PROCEDURE — 25000003 PHARM REV CODE 250: Performed by: SURGERY

## 2025-01-12 PROCEDURE — 85025 COMPLETE CBC W/AUTO DIFF WBC: CPT | Performed by: HOSPITALIST

## 2025-01-12 RX ADMIN — PIPERACILLIN SODIUM AND TAZOBACTAM SODIUM 4.5 G: 4; .5 INJECTION, POWDER, LYOPHILIZED, FOR SOLUTION INTRAVENOUS at 12:01

## 2025-01-12 RX ADMIN — OXYCODONE HYDROCHLORIDE AND ACETAMINOPHEN 1 TABLET: 5; 325 TABLET ORAL at 10:01

## 2025-01-12 RX ADMIN — Medication 200 ML: at 08:01

## 2025-01-12 RX ADMIN — Medication 200 ML: at 12:01

## 2025-01-12 RX ADMIN — PIPERACILLIN SODIUM AND TAZOBACTAM SODIUM 4.5 G: 4; .5 INJECTION, POWDER, LYOPHILIZED, FOR SOLUTION INTRAVENOUS at 08:01

## 2025-01-12 RX ADMIN — Medication 200 ML: at 04:01

## 2025-01-12 RX ADMIN — Medication 200 ML: at 03:01

## 2025-01-12 RX ADMIN — PIPERACILLIN SODIUM AND TAZOBACTAM SODIUM 4.5 G: 4; .5 INJECTION, POWDER, LYOPHILIZED, FOR SOLUTION INTRAVENOUS at 04:01

## 2025-01-12 RX ADMIN — OXYCODONE HYDROCHLORIDE AND ACETAMINOPHEN 1 TABLET: 5; 325 TABLET ORAL at 03:01

## 2025-01-12 NOTE — ASSESSMENT & PLAN NOTE
Resolved   Some concern for possible bile leak however SAMANTHA drain output has decreased significantly.    SAMANTHA still putting out output.    With the events patient tolerated well no pain.    Patient likely be stable for discharge tomorrow.

## 2025-01-12 NOTE — PLAN OF CARE
Problem: Adult Inpatient Plan of Care  Goal: Plan of Care Review  Outcome: Progressing  Flowsheets (Taken 1/11/2025 1948)  Plan of Care Reviewed With: patient     Problem: Wound  Goal: Absence of Infection Signs and Symptoms  Outcome: Progressing  Intervention: Prevent or Manage Infection  Flowsheets (Taken 1/11/2025 1948)  Fever Reduction/Comfort Measures:   lightweight bedding   lightweight clothing

## 2025-01-12 NOTE — ASSESSMENT & PLAN NOTE
Status post exploratory laparotomy with lysis of adhesions and incisional hernia repair on 1/7/2025.  Discussed general surgery who recommended holding off on HIDA scan for now and attempt to advance diet.  Current oral intake.  Clinically doing well.  Continue supportive care.  Monitor.

## 2025-01-12 NOTE — SUBJECTIVE & OBJECTIVE
Interval History:  Patient better today.    Tolerating diet without any difficulty.    Drain putting out minimal.    Diet has been advanced and she is still doing fine.    Patient likely to be able to be discharged tomorrow.        Medications:  Continuous Infusions:   lactated ringers   Intravenous Continuous 75 mL/hr at 01/11/25 1844 Rate Verify at 01/11/25 1844     Scheduled Meds:   electrolytes-dextrose  200 mL Oral Q4H    piperacillin-tazobactam (Zosyn) IV (PEDS and ADULTS) (extended infusion is not appropriate)  4.5 g Intravenous Q8H     PRN Meds:  Current Facility-Administered Medications:     HYDROmorphone, 0.5 mg, Intravenous, Q6H PRN    ondansetron, 4 mg, Intravenous, Q6H PRN    oxyCODONE-acetaminophen, 1 tablet, Oral, Q4H PRN    sodium chloride 0.9%, 10 mL, Intravenous, PRN     Review of patient's allergies indicates:   Allergen Reactions    Shellfish containing products Anaphylaxis     Objective:     Vital Signs (Most Recent):  Temp: 98.2 °F (36.8 °C) (01/12/25 1130)  Pulse: 76 (01/12/25 1130)  Resp: 18 (01/12/25 1130)  BP: 118/74 (01/12/25 1130)  SpO2: 100 % (01/12/25 1130) Vital Signs (24h Range):  Temp:  [97.4 °F (36.3 °C)-98.3 °F (36.8 °C)] 98.2 °F (36.8 °C)  Pulse:  [66-96] 76  Resp:  [16-20] 18  SpO2:  [98 %-100 %] 100 %  BP: (113-142)/(72-88) 118/74     Weight: 108 kg (238 lb 1.6 oz)  Body mass index is 44.99 kg/m².    Intake/Output - Last 3 Shifts         01/10 0700 01/11 0659 01/11 0700 01/12 0659 01/12 0700 01/13 0659    P.O. 560 820     I.V. (mL/kg) 579.2 (5.4) 851.4 (7.9)     Other       IV Piggyback 195.2 299.6     Total Intake(mL/kg) 1334.5 (12.4) 1971.1 (18.3)     Urine (mL/kg/hr) 2100 (0.8) 2200 (0.8)     Emesis/NG output 0 0     Drains 10 5 5    Other 0      Stool 0 0     Blood 0      Total Output 2110 2205 5    Net -775.5 -234 -5           Urine Occurrence 0 x      Stool Occurrence 0 x 1 x     Emesis Occurrence 0 x               Physical Exam  Vitals and nursing note reviewed.    Constitutional:       Appearance: She is well-developed.   HENT:      Head: Normocephalic and atraumatic.   Cardiovascular:      Rate and Rhythm: Normal rate.      Heart sounds: Normal heart sounds.   Pulmonary:      Effort: Pulmonary effort is normal.   Abdominal:      General: Bowel sounds are normal. There is no distension.      Palpations: Abdomen is soft.      Tenderness: There is no abdominal tenderness.   Musculoskeletal:         General: Normal range of motion.      Cervical back: Normal range of motion.   Skin:     General: Skin is warm and dry.      Capillary Refill: Capillary refill takes less than 2 seconds.   Neurological:      Mental Status: She is alert and oriented to person, place, and time.   Psychiatric:         Behavior: Behavior normal.          Significant Labs:  I have reviewed all pertinent lab results within the past 24 hours.  CBC:   Recent Labs   Lab 01/12/25  0336   WBC 11.08   RBC 4.20   HGB 11.8*   HCT 36.8*   *   MCV 88   MCH 28.1   MCHC 32.1     CMP:   Recent Labs   Lab 01/12/25  0336   GLU 93   CALCIUM 8.9   ALBUMIN 3.0*   PROT 6.4      K 3.7   CO2 20*      BUN 5*   CREATININE 0.7   ALKPHOS 64   ALT 34   AST 34   BILITOT 0.3       Significant Diagnostics:  I have reviewed all pertinent imaging results/findings within the past 24 hours.

## 2025-01-12 NOTE — PROGRESS NOTES
"[facility] Surg [address] Medicine  Progress Note    Patient Name: Nadir Abernathy  MRN: [id number]  Patient Class: IP- Inpatient   Admission Date: 1/7/2025  Length of Stay: 5 days  Attending Physician: Chirag Acuna MD  Primary Care Provider: Jag Lake MD        Principal Problem:Small bowel obstruction        HPI:  As per Chirag Diaz MD:    "Ms. Abernathy is a 54 year-old woman with morbid obesity who presented for evaluation of nausea, vomiting and abdominal pain.  Of note she underwent laparoscopic converted to open cholecystectomy recently and was discharged home with SAMANTHA drain in place.  Since going home she has been unable to keep anything down because of severe nausea and vomiting.  Upon presentation yesterday she was found to have a small bowel obstruction due to an incisional hernia.  She was admitted by Dr. Chirag Acuna and taken to the OR for exploratory laparotomy with lysis of adhesions and incisional hernia repair.  She is passing flatus and trialing clear liquids and was found to have significant hypokalemia this morning.  I have been consulted to provide medical co-management services.  At this time she is feeling better, but still somewhat struggling with pain.  She denies fevers, chills, cough, chest pain, dysuria, numbness, headaches and tingling."    Overview/Hospital Course:  Patient is a 54 year-old woman with morbid obesity status post cholecystectomy 12/30/24 who presented for evaluation of nausea and vomiting.  She was found to have small-bowel obstruction due to incisional hernia.  She was admitted by Dr. CHIO Acuna and taken for exploratory laparotomy with lysis of adhesions and incisional hernia repair.  Concerned about possible biliary leak HIDA scan ordered.  HIDA scan was not able to be performed yesterday not available over the weekend.  Discuss with General surgery.  Recommended holding off on HIDA scan and attempt to advance diet at this time.    Interval " History: Output from percutaneous drain amount the same today.  Tolerating oral intake and feeling much better.    Review of Systems   Constitutional:  Negative for chills and fever.   Respiratory:  Negative for shortness of breath.    Cardiovascular:  Negative for chest pain.   Gastrointestinal:  Positive for abdominal pain. Negative for nausea and vomiting.   Genitourinary:  Negative for dysuria and frequency.     Objective:     Vital Signs (Most Recent):  Temp: 97.5 °F (36.4 °C) (01/12/25 0739)  Pulse: 69 (01/12/25 0739)  Resp: 18 (01/12/25 0739)  BP: 113/72 (01/12/25 0739)  SpO2: 98 % (01/12/25 0739) Vital Signs (24h Range):  Temp:  [97.4 °F (36.3 °C)-98.3 °F (36.8 °C)] 97.5 °F (36.4 °C)  Pulse:  [66-96] 69  Resp:  [16-20] 18  SpO2:  [98 %-100 %] 98 %  BP: (113-142)/(72-88) 113/72     Weight: 108 kg (238 lb 1.6 oz)  Body mass index is 44.99 kg/m².    Intake/Output Summary (Last 24 hours) at 1/12/2025 1047  Last data filed at 1/12/2025 0706  Gross per 24 hour   Intake 1971.05 ml   Output 1805 ml   Net 166.05 ml         Physical Exam  Constitutional:       General: She is not in acute distress.  HENT:      Head: Normocephalic and atraumatic.   Eyes:      Conjunctiva/sclera: Conjunctivae normal.   Cardiovascular:      Rate and Rhythm: Regular rhythm.      Heart sounds: Normal heart sounds. No murmur heard.  Pulmonary:      Effort: Pulmonary effort is normal.      Breath sounds: Normal breath sounds.   Abdominal:      General: Bowel sounds are normal. There is no distension.      Palpations: Abdomen is soft.      Comments: Surgical incision clean, dry, intact.  SAMANTHA drain with minimal output.   Skin:     General: Skin is warm and dry.      Findings: No rash.             Significant Labs: All pertinent labs within the past 24 hours have been reviewed.    Significant Imaging: I have reviewed all pertinent imaging results/findings within the past 24 hours.    Assessment and Plan     * Small bowel obstruction  Status post  exploratory laparotomy with lysis of adhesions and incisional hernia repair on 1/7/2025.  Discussed general surgery who recommended holding off on HIDA scan for now and attempt to advance diet.  Current oral intake.  Clinically doing well.  Continue supportive care.  Monitor.    Morbid obesity  Body mass index is 43.9 kg/m². Morbid obesity complicates all aspects of disease management from diagnostic modalities to treatment. Weight loss encouraged and health benefits explained to patient.      VTE Risk Mitigation (From admission, onward)           Ordered     IP VTE HIGH RISK PATIENT  Once         01/08/25 0016     Place sequential compression device  Until discontinued         01/08/25 0016                      Nelson Escobar MD  Department of Hospital Medicine   [facility] - Med Surg ([address])

## 2025-01-12 NOTE — SUBJECTIVE & OBJECTIVE
Interval History: Output from percutaneous drain amount the same today.  Tolerating oral intake and feeling much better.    Review of Systems   Constitutional:  Negative for chills and fever.   Respiratory:  Negative for shortness of breath.    Cardiovascular:  Negative for chest pain.   Gastrointestinal:  Positive for abdominal pain. Negative for nausea and vomiting.   Genitourinary:  Negative for dysuria and frequency.     Objective:     Vital Signs (Most Recent):  Temp: 97.5 °F (36.4 °C) (01/12/25 0739)  Pulse: 69 (01/12/25 0739)  Resp: 18 (01/12/25 0739)  BP: 113/72 (01/12/25 0739)  SpO2: 98 % (01/12/25 0739) Vital Signs (24h Range):  Temp:  [97.4 °F (36.3 °C)-98.3 °F (36.8 °C)] 97.5 °F (36.4 °C)  Pulse:  [66-96] 69  Resp:  [16-20] 18  SpO2:  [98 %-100 %] 98 %  BP: (113-142)/(72-88) 113/72     Weight: 108 kg (238 lb 1.6 oz)  Body mass index is 44.99 kg/m².    Intake/Output Summary (Last 24 hours) at 1/12/2025 1047  Last data filed at 1/12/2025 0706  Gross per 24 hour   Intake 1971.05 ml   Output 1805 ml   Net 166.05 ml         Physical Exam  Constitutional:       General: She is not in acute distress.  HENT:      Head: Normocephalic and atraumatic.   Eyes:      Conjunctiva/sclera: Conjunctivae normal.   Cardiovascular:      Rate and Rhythm: Regular rhythm.      Heart sounds: Normal heart sounds. No murmur heard.  Pulmonary:      Effort: Pulmonary effort is normal.      Breath sounds: Normal breath sounds.   Abdominal:      General: Bowel sounds are normal. There is no distension.      Palpations: Abdomen is soft.      Comments: Surgical incision clean, dry, intact.  SAMANTHA drain with minimal output.   Skin:     General: Skin is warm and dry.      Findings: No rash.             Significant Labs: All pertinent labs within the past 24 hours have been reviewed.    Significant Imaging: I have reviewed all pertinent imaging results/findings within the past 24 hours.

## 2025-01-13 VITALS
WEIGHT: 238.13 LBS | SYSTOLIC BLOOD PRESSURE: 119 MMHG | HEART RATE: 77 BPM | DIASTOLIC BLOOD PRESSURE: 58 MMHG | BODY MASS INDEX: 44.96 KG/M2 | RESPIRATION RATE: 18 BRPM | TEMPERATURE: 98 F | HEIGHT: 61 IN | OXYGEN SATURATION: 98 %

## 2025-01-13 PROBLEM — K56.609 SMALL BOWEL OBSTRUCTION: Status: RESOLVED | Noted: 2025-01-07 | Resolved: 2025-01-13

## 2025-01-13 LAB
ALBUMIN SERPL BCP-MCNC: 3.3 G/DL (ref 3.5–5.2)
ALP SERPL-CCNC: 76 U/L (ref 40–150)
ALT SERPL W/O P-5'-P-CCNC: 48 U/L (ref 10–44)
ANION GAP SERPL CALC-SCNC: 8 MMOL/L (ref 8–16)
AST SERPL-CCNC: 46 U/L (ref 10–40)
BASOPHILS # BLD AUTO: 0.06 K/UL (ref 0–0.2)
BASOPHILS NFR BLD: 0.5 % (ref 0–1.9)
BILIRUB SERPL-MCNC: 0.3 MG/DL (ref 0.1–1)
BUN SERPL-MCNC: 6 MG/DL (ref 6–20)
CALCIUM SERPL-MCNC: 9.7 MG/DL (ref 8.7–10.5)
CHLORIDE SERPL-SCNC: 107 MMOL/L (ref 95–110)
CO2 SERPL-SCNC: 22 MMOL/L (ref 23–29)
CREAT SERPL-MCNC: 0.7 MG/DL (ref 0.5–1.4)
DIFFERENTIAL METHOD BLD: ABNORMAL
EOSINOPHIL # BLD AUTO: 0.5 K/UL (ref 0–0.5)
EOSINOPHIL NFR BLD: 4.1 % (ref 0–8)
ERYTHROCYTE [DISTWIDTH] IN BLOOD BY AUTOMATED COUNT: 13.2 % (ref 11.5–14.5)
EST. GFR  (NO RACE VARIABLE): >60 ML/MIN/1.73 M^2
GLUCOSE SERPL-MCNC: 100 MG/DL (ref 70–110)
HCT VFR BLD AUTO: 37.2 % (ref 37–48.5)
HGB BLD-MCNC: 11.6 G/DL (ref 12–16)
IMM GRANULOCYTES # BLD AUTO: 0.1 K/UL (ref 0–0.04)
IMM GRANULOCYTES NFR BLD AUTO: 0.9 % (ref 0–0.5)
LYMPHOCYTES # BLD AUTO: 2.7 K/UL (ref 1–4.8)
LYMPHOCYTES NFR BLD: 22.9 % (ref 18–48)
MAGNESIUM SERPL-MCNC: 2 MG/DL (ref 1.6–2.6)
MCH RBC QN AUTO: 27.7 PG (ref 27–31)
MCHC RBC AUTO-ENTMCNC: 31.2 G/DL (ref 32–36)
MCV RBC AUTO: 89 FL (ref 82–98)
MONOCYTES # BLD AUTO: 0.7 K/UL (ref 0.3–1)
MONOCYTES NFR BLD: 6.2 % (ref 4–15)
NEUTROPHILS # BLD AUTO: 7.6 K/UL (ref 1.8–7.7)
NEUTROPHILS NFR BLD: 65.4 % (ref 38–73)
NRBC BLD-RTO: 0 /100 WBC
PHOSPHATE SERPL-MCNC: 3.7 MG/DL (ref 2.7–4.5)
PLATELET # BLD AUTO: 572 K/UL (ref 150–450)
PMV BLD AUTO: 8.4 FL (ref 9.2–12.9)
POTASSIUM SERPL-SCNC: 4.1 MMOL/L (ref 3.5–5.1)
PROT SERPL-MCNC: 7.1 G/DL (ref 6–8.4)
RBC # BLD AUTO: 4.19 M/UL (ref 4–5.4)
SODIUM SERPL-SCNC: 137 MMOL/L (ref 136–145)
WBC # BLD AUTO: 11.57 K/UL (ref 3.9–12.7)

## 2025-01-13 PROCEDURE — 84100 ASSAY OF PHOSPHORUS: CPT | Performed by: HOSPITALIST

## 2025-01-13 PROCEDURE — 80053 COMPREHEN METABOLIC PANEL: CPT | Performed by: HOSPITALIST

## 2025-01-13 PROCEDURE — 25000003 PHARM REV CODE 250: Performed by: SURGERY

## 2025-01-13 PROCEDURE — 36415 COLL VENOUS BLD VENIPUNCTURE: CPT | Performed by: HOSPITALIST

## 2025-01-13 PROCEDURE — 83735 ASSAY OF MAGNESIUM: CPT | Performed by: HOSPITALIST

## 2025-01-13 PROCEDURE — 63600175 PHARM REV CODE 636 W HCPCS: Performed by: SURGERY

## 2025-01-13 PROCEDURE — 85025 COMPLETE CBC W/AUTO DIFF WBC: CPT | Performed by: HOSPITALIST

## 2025-01-13 RX ADMIN — Medication 200 ML: at 12:01

## 2025-01-13 RX ADMIN — OXYCODONE HYDROCHLORIDE AND ACETAMINOPHEN 1 TABLET: 5; 325 TABLET ORAL at 02:01

## 2025-01-13 RX ADMIN — Medication 200 ML: at 04:01

## 2025-01-13 RX ADMIN — PIPERACILLIN SODIUM AND TAZOBACTAM SODIUM 4.5 G: 4; .5 INJECTION, POWDER, LYOPHILIZED, FOR SOLUTION INTRAVENOUS at 04:01

## 2025-01-13 RX ADMIN — SODIUM CHLORIDE, POTASSIUM CHLORIDE, SODIUM LACTATE AND CALCIUM CHLORIDE: 600; 310; 30; 20 INJECTION, SOLUTION INTRAVENOUS at 02:01

## 2025-01-13 RX ADMIN — PIPERACILLIN SODIUM AND TAZOBACTAM SODIUM 4.5 G: 4; .5 INJECTION, POWDER, LYOPHILIZED, FOR SOLUTION INTRAVENOUS at 12:01

## 2025-01-13 NOTE — PLAN OF CARE
[facility] - Med Surg ([address])  Discharge Final Note    Primary Care Provider: Jag Lake MD    Expected Discharge Date: 1/15/2025    Final Discharge Note (most recent)       Final Note - 01/13/25 1230          Final Note    Assessment Type Final Discharge Note (P)      Anticipated Discharge Disposition Home or Self Care (P)      Hospital Resources/Appts/Education Provided Provided patient/caregiver with written discharge plan information;Appointments scheduled and added to AVS (P)         Post-Acute Status    Patient choice form signed by patient/caregiver List with quality metrics by geographic area provided (P)      Discharge Delays None known at this time (P)                      Important Message from Medicare           Patient will discharge home with family.     Patient will be transported home by family at discharge.     Patient have no discharge needs/orders from sw/cm standpoint.

## 2025-01-13 NOTE — SUBJECTIVE & OBJECTIVE
Interval History: Output from percutaneous drain amount the same today.  Tolerating oral intake and feeling much better.    Review of Systems   Constitutional:  Negative for chills and fever.   Respiratory:  Negative for shortness of breath.    Cardiovascular:  Negative for chest pain.   Gastrointestinal:  Positive for abdominal pain. Negative for nausea and vomiting.   Genitourinary:  Negative for dysuria and frequency.     Objective:     Vital Signs (Most Recent):  Temp: 97.8 °F (36.6 °C) (01/13/25 0730)  Pulse: 94 (01/13/25 1059)  Resp: 18 (01/13/25 0730)  BP: (!) 119/58 (01/13/25 0730)  SpO2: 98 % (01/13/25 0730) Vital Signs (24h Range):  Temp:  [97.4 °F (36.3 °C)-98.4 °F (36.9 °C)] 97.8 °F (36.6 °C)  Pulse:  [65-94] 94  Resp:  [12-20] 18  SpO2:  [97 %-100 %] 98 %  BP: (116-143)/(58-77) 119/58     Weight: 108 kg (238 lb 1.6 oz)  Body mass index is 44.99 kg/m².    Intake/Output Summary (Last 24 hours) at 1/13/2025 1110  Last data filed at 1/13/2025 1055  Gross per 24 hour   Intake 2352.96 ml   Output 1010 ml   Net 1342.96 ml         Physical Exam  Constitutional:       General: She is not in acute distress.  HENT:      Head: Normocephalic and atraumatic.   Eyes:      Conjunctiva/sclera: Conjunctivae normal.   Cardiovascular:      Rate and Rhythm: Regular rhythm.      Heart sounds: Normal heart sounds. No murmur heard.  Pulmonary:      Effort: Pulmonary effort is normal.      Breath sounds: Normal breath sounds.   Abdominal:      General: Bowel sounds are normal. There is no distension.      Palpations: Abdomen is soft.      Comments: Surgical incision clean, dry, intact.  SAMANTHA drain with minimal output.   Skin:     General: Skin is warm and dry.      Findings: No rash.             Significant Labs: All pertinent labs within the past 24 hours have been reviewed.    Significant Imaging: I have reviewed all pertinent imaging results/findings within the past 24 hours.

## 2025-01-13 NOTE — DISCHARGE SUMMARY
[facility] Surg ([address])  Discharge Note  Inpatient    Procedure(s) (LRB):  REPAIR, HERNIA, INCISIONAL (N/A)      OUTCOME: Condition has improved and patient is now ready for discharge.    COURSE:  54-year-old patient admitted with bowel obstruction.  She was admitted on postoperative day 8 from robotic converted to open cholecystectomy.  Robotic port site above the umbilicus incarcerated small bowel causing the obstruction.  She was taken immediately for exploration of this port site and reduction of small bowel which was healthy.  Repair of this port site was accomplished.  As she was recuperating from surgery some bile was noted at the SAMANTHA drain from previous operation(open cholecystectomy).  HIDA scan was ordered 3 days ago but she had eaten and it was postponed.  HIDA scan is not done at this facility on weekends.  However, over the last 2 days bilious drainage has decreased to a scant amount of 5 cc and overnight became serous.  It no longer appears bilious.  As far as bowel obstruction is concerned, she has been eating a regular diet and having flatus and bowel movements.  She is afebrile and has had normal bilirubin.  She is ready for discharge.  She will follow up with me in 2-3 days to check drainage from the SAMANTHA drain and likely remove the drain    DISPOSITION: Home or Self Care    FINAL DIAGNOSIS:  Small bowel obstruction      FOLLOWUP: In clinic 2-3 days    DISCHARGE INSTRUCTIONS:    Discharge Procedure Orders   Diet general     Lifting restrictions   Order Comments: No lifting greater than 15 lb and no strenuous activity for 6 weeks     No dressing needed   Order Comments: Patient may shower.  Do not remove dressing from drain     Call MD for:  temperature >100.4     Call MD for:  persistent nausea and vomiting     Call MD for:  severe uncontrolled pain     Call MD for:  difficulty breathing, headache or visual disturbances     Call MD for:  redness, tenderness, or signs of infection (pain,  swelling, redness, odor or green/yellow discharge around incision site)        TIME SPENT ON DISCHARGE: 20 minutes

## 2025-01-13 NOTE — PROGRESS NOTES
"[facility] Surg [address] Medicine  Progress Note    Patient Name: Nadir Abernathy  MRN: [id number]  Patient Class: IP- Inpatient   Admission Date: 1/7/2025  Length of Stay: 6 days  Attending Physician: Chirag Acuna MD  Primary Care Provider: Jag Lake MD          Principal Problem:Small bowel obstruction        HPI:  As per Chirag Diaz MD:    "Ms. Abernathy is a 54 year-old woman with morbid obesity who presented for evaluation of nausea, vomiting and abdominal pain.  Of note she underwent laparoscopic converted to open cholecystectomy recently and was discharged home with SAMANTHA drain in place.  Since going home she has been unable to keep anything down because of severe nausea and vomiting.  Upon presentation yesterday she was found to have a small bowel obstruction due to an incisional hernia.  She was admitted by Dr. Chirag Acuna and taken to the OR for exploratory laparotomy with lysis of adhesions and incisional hernia repair.  She is passing flatus and trialing clear liquids and was found to have significant hypokalemia this morning.  I have been consulted to provide medical co-management services.  At this time she is feeling better, but still somewhat struggling with pain.  She denies fevers, chills, cough, chest pain, dysuria, numbness, headaches and tingling."    Overview/Hospital Course:  Patient is a 54 year-old woman with morbid obesity status post cholecystectomy 12/30/24 who presented for evaluation of nausea and vomiting.  She was found to have small-bowel obstruction due to incisional hernia.  She was admitted by Dr. CHIO Acuna and taken for exploratory laparotomy with lysis of adhesions and incisional hernia repair.  Concerned about possible biliary leak HIDA scan ordered.  HIDA scan was not able to be performed yesterday not available over the weekend.  Discuss with General surgery.  Recommended holding off on HIDA scan and attempt to advance diet at this " time.    Interval History: Output from percutaneous drain amount the same today.  Tolerating oral intake and feeling much better.    Review of Systems   Constitutional:  Negative for chills and fever.   Respiratory:  Negative for shortness of breath.    Cardiovascular:  Negative for chest pain.   Gastrointestinal:  Positive for abdominal pain. Negative for nausea and vomiting.   Genitourinary:  Negative for dysuria and frequency.     Objective:     Vital Signs (Most Recent):  Temp: 97.8 °F (36.6 °C) (01/13/25 0730)  Pulse: 94 (01/13/25 1059)  Resp: 18 (01/13/25 0730)  BP: (!) 119/58 (01/13/25 0730)  SpO2: 98 % (01/13/25 0730) Vital Signs (24h Range):  Temp:  [97.4 °F (36.3 °C)-98.4 °F (36.9 °C)] 97.8 °F (36.6 °C)  Pulse:  [65-94] 94  Resp:  [12-20] 18  SpO2:  [97 %-100 %] 98 %  BP: (116-143)/(58-77) 119/58     Weight: 108 kg (238 lb 1.6 oz)  Body mass index is 44.99 kg/m².    Intake/Output Summary (Last 24 hours) at 1/13/2025 1110  Last data filed at 1/13/2025 1055  Gross per 24 hour   Intake 2352.96 ml   Output 1010 ml   Net 1342.96 ml         Physical Exam  Constitutional:       General: She is not in acute distress.  HENT:      Head: Normocephalic and atraumatic.   Eyes:      Conjunctiva/sclera: Conjunctivae normal.   Cardiovascular:      Rate and Rhythm: Regular rhythm.      Heart sounds: Normal heart sounds. No murmur heard.  Pulmonary:      Effort: Pulmonary effort is normal.      Breath sounds: Normal breath sounds.   Abdominal:      General: Bowel sounds are normal. There is no distension.      Palpations: Abdomen is soft.      Comments: Surgical incision clean, dry, intact.  SAMANTHA drain with minimal output.   Skin:     General: Skin is warm and dry.      Findings: No rash.             Significant Labs: All pertinent labs within the past 24 hours have been reviewed.    Significant Imaging: I have reviewed all pertinent imaging results/findings within the past 24 hours.    Assessment and Plan     * Small bowel  obstruction  Status post exploratory laparotomy with lysis of adhesions and incisional hernia repair on 1/7/2025.  Discussed general surgery who recommended holding off on HIDA scan for now and attempt to advance diet.  Current tolerating oral intake.  Output from SAMANTHA drain seems to be decreasing.  Continue supportive care.    Morbid obesity  Body mass index is 43.9 kg/m². Morbid obesity complicates all aspects of disease management from diagnostic modalities to treatment. Weight loss encouraged and health benefits explained to patient.      VTE Risk Mitigation (From admission, onward)           Ordered     IP VTE HIGH RISK PATIENT  Once         01/08/25 0016     Place sequential compression device  Until discontinued         01/08/25 0016                    Nelson Escobar MD  Department of Hospital Medicine   [facility] - Med Surg ([address])

## 2025-01-13 NOTE — PLAN OF CARE
Problem: Pain Acute  Goal: Optimal Pain Control and Function  Outcome: Progressing  Intervention: Develop Pain Management Plan  Flowsheets (Taken 1/13/2025 0511)  Pain Management Interventions:   pillow support provided   quiet environment facilitated   relaxation techniques promoted  Intervention: Prevent or Manage Pain  Flowsheets (Taken 1/13/2025 0511)  Sleep/Rest Enhancement:   regular sleep/rest pattern promoted   room darkened  Sensory Stimulation Regulation: television on  Medication Review/Management: medications reviewed   PRN meds effective for pain. SAMANTHA drain w/ small amt. Of drainage during shift. Remains R on telemetry. Received scheduled antibiotics.

## 2025-01-13 NOTE — ASSESSMENT & PLAN NOTE
Status post exploratory laparotomy with lysis of adhesions and incisional hernia repair on 1/7/2025.  Discussed general surgery who recommended holding off on HIDA scan for now and attempt to advance diet.  Current tolerating oral intake.  Output from SAMANTHA drain seems to be decreasing.  Continue supportive care.

## 2025-01-13 NOTE — NURSING
IV removed with catheter intact, patient given DC paperwork and reviewed with RN, Dr. Gordon called for prescription for ABX to be sent to [facility] on [address]

## 2025-01-14 ENCOUNTER — TELEPHONE (OUTPATIENT)
Dept: INTERNAL MEDICINE | Facility: CLINIC | Age: 55
End: 2025-01-14
Payer: MEDICAID

## 2025-01-14 NOTE — TELEPHONE ENCOUNTER
----- Message from Alejandra sent at 1/13/2025 11:52 AM CST -----  Regarding: appt Req  Contact: Case management  Type: Appointment Request    Caller is requesting an appointment     Name of Caller: Case Management   Reason for appointment: hospital Follow up  Would the patient rather a call back or a response via [facility]? Call back  Best Call Back Number:   [phone]  Additional Information: Please call, Thank You

## 2025-01-15 ENCOUNTER — OFFICE VISIT (OUTPATIENT)
Dept: SURGERY | Facility: CLINIC | Age: 55
End: 2025-01-15
Payer: MEDICAID

## 2025-01-15 VITALS — OXYGEN SATURATION: 100 % | DIASTOLIC BLOOD PRESSURE: 71 MMHG | SYSTOLIC BLOOD PRESSURE: 138 MMHG | HEART RATE: 80 BPM

## 2025-01-15 DIAGNOSIS — Z90.49 STATUS POST CHOLECYSTECTOMY: Primary | ICD-10-CM

## 2025-01-15 DIAGNOSIS — Z98.890 STATUS POST REPAIR OF VENTRAL HERNIA: ICD-10-CM

## 2025-01-15 DIAGNOSIS — Z87.19 STATUS POST REPAIR OF VENTRAL HERNIA: ICD-10-CM

## 2025-01-15 DIAGNOSIS — K80.10 CHRONIC CALCULOUS CHOLECYSTITIS: ICD-10-CM

## 2025-01-15 DIAGNOSIS — Z87.19 HISTORY OF SMALL BOWEL OBSTRUCTION: ICD-10-CM

## 2025-01-15 DIAGNOSIS — K80.00 ACUTE CALCULOUS CHOLECYSTITIS: ICD-10-CM

## 2025-01-15 PROCEDURE — 1159F MED LIST DOCD IN RCRD: CPT | Mod: CPTII,,, | Performed by: SURGERY

## 2025-01-15 PROCEDURE — 3078F DIAST BP <80 MM HG: CPT | Mod: CPTII,,, | Performed by: SURGERY

## 2025-01-15 PROCEDURE — 99213 OFFICE O/P EST LOW 20 MIN: CPT | Mod: PBBFAC | Performed by: SURGERY

## 2025-01-15 PROCEDURE — 3075F SYST BP GE 130 - 139MM HG: CPT | Mod: CPTII,,, | Performed by: SURGERY

## 2025-01-15 PROCEDURE — 99024 POSTOP FOLLOW-UP VISIT: CPT | Mod: ,,, | Performed by: SURGERY

## 2025-01-15 PROCEDURE — 99999 PR PBB SHADOW E&M-EST. PATIENT-LVL III: CPT | Mod: PBBFAC,,, | Performed by: SURGERY

## 2025-01-15 PROCEDURE — 1160F RVW MEDS BY RX/DR IN RCRD: CPT | Mod: CPTII,,, | Performed by: SURGERY

## 2025-01-15 NOTE — PROGRESS NOTES
HPI:       Patient returns to the office after 2 hospital admissions since last visit.  She was admitted on 12/30 4 robotic cholecystectomy and required conversion to open procedure.  After discharge from cholecystectomy,she became nauseated and vomited and was readmitted on postoperative day 8 from cholecystectomy for small-bowel obstruction.  Robotic port site above the umbilicus incarcerated small bowel causing the obstruction.  She was taken immediately for exploration of this port site and reduction of small bowel which was healthy.  Repair of this port site was accomplished.  As she was recuperating from surgery some bile was noted at the SAMANTHA drain from previous operation(open cholecystectomy).  HIDA scan was ordered, while in hospital, but she had eaten and it was postponed.  HIDA scan is not done at this facility on weekends.  However, over the last 3 days in the hospital the days bilious drainage has decreased to a scant amount of 5 cc and  became serous.  It remains so today and only in scant amounts of serous fluid.  It no longer appears bilious.  As far as bowel obstruction is concerned, she is eating well and having regular bowel movements and flatus.  Normal color urine    PHYSICAL EXAM:  Physical Exam     In NAD  Eyes nonicteric sclera  Incisions intact.  Staples removed.  Drain removed.  Abdomen is soft nontender nondistended     ASSESSMENT:    The patient is doing well status post robotic converted to open cholecystectomy 12/30/2024 and and repair of incisional port site hernia with bowel obstruction 01/07/2025    No sign of continued bile leak.  It is possible that bile leak was just residual bile collected in the gallbladder fossa during surgery   .            PLAN:    Continue to avoid strenuous activity and lifting more than 10 lb for 2 months.  She wants to return to work light duty in 1 month.  Return to the office at this time for re-evaluation and clearance for return to work.  Return to  the office or to the emergency department if abdominal pain develops or she has a change in the color of her urine or if any problems arise

## 2025-01-17 RX ORDER — OMEPRAZOLE 20 MG/1
CAPSULE, DELAYED RELEASE ORAL
Qty: 90 CAPSULE | Refills: 1 | Status: SHIPPED | OUTPATIENT
Start: 2025-01-17

## 2025-01-17 NOTE — TELEPHONE ENCOUNTER
No care due was identified.  [facility] Embedded Care Due Messages. Reference number: [id number].   1/17/2025 2:53:38 PM CST

## 2025-01-18 NOTE — TELEPHONE ENCOUNTER
Refill Decision Note   Nadir Abernathy  is requesting a refill authorization.  Brief Assessment and Rationale for Refill:  Approve     Medication Therapy Plan:  ED visit for calculus of gall bladder, acute calculous cholecystitis, SBO. FOVS. Approve 90 with 1      Comments:     Note composed:8:47 PM 01/17/2025             Appointments     Last Visit   7/24/2024 Jag Lake MD   Next Visit   1/22/2025 Jag Lake MD

## 2025-01-22 NOTE — PLAN OF CARE
VS stable, pain managed throughout shift. No other distress reported. Afebrile this shift.  Repositions self independently in bed and ambulating around room. Up in chair all day. Good UOP. Free from injuries or skin breakdown. Fall precautions maintained. Call light within reach. POC updated, questioned answered and comments acknowledged. Purposeful hourly rounding completed this shift   The patient is a 89y Female complaining of cough.

## 2025-01-27 ENCOUNTER — OFFICE VISIT (OUTPATIENT)
Dept: INTERNAL MEDICINE | Facility: CLINIC | Age: 55
End: 2025-01-27
Attending: FAMILY MEDICINE
Payer: MEDICAID

## 2025-01-27 VITALS
OXYGEN SATURATION: 100 % | WEIGHT: 218.5 LBS | SYSTOLIC BLOOD PRESSURE: 100 MMHG | HEIGHT: 61 IN | DIASTOLIC BLOOD PRESSURE: 68 MMHG | HEART RATE: 83 BPM | BODY MASS INDEX: 41.25 KG/M2

## 2025-01-27 DIAGNOSIS — K56.609 SMALL BOWEL OBSTRUCTION: ICD-10-CM

## 2025-01-27 DIAGNOSIS — I10 HYPERTENSION, ESSENTIAL: Primary | ICD-10-CM

## 2025-01-27 PROCEDURE — G2211 COMPLEX E/M VISIT ADD ON: HCPCS | Mod: S$PBB,,, | Performed by: FAMILY MEDICINE

## 2025-01-27 PROCEDURE — 1111F DSCHRG MED/CURRENT MED MERGE: CPT | Mod: CPTII,,, | Performed by: FAMILY MEDICINE

## 2025-01-27 PROCEDURE — 3074F SYST BP LT 130 MM HG: CPT | Mod: CPTII,,, | Performed by: FAMILY MEDICINE

## 2025-01-27 PROCEDURE — 99215 OFFICE O/P EST HI 40 MIN: CPT | Mod: S$PBB,,, | Performed by: FAMILY MEDICINE

## 2025-01-27 PROCEDURE — 3078F DIAST BP <80 MM HG: CPT | Mod: CPTII,,, | Performed by: FAMILY MEDICINE

## 2025-01-27 PROCEDURE — 99999 PR PBB SHADOW E&M-EST. PATIENT-LVL III: CPT | Mod: PBBFAC,,, | Performed by: FAMILY MEDICINE

## 2025-01-27 PROCEDURE — 99213 OFFICE O/P EST LOW 20 MIN: CPT | Mod: PBBFAC | Performed by: FAMILY MEDICINE

## 2025-01-27 PROCEDURE — 3008F BODY MASS INDEX DOCD: CPT | Mod: CPTII,,, | Performed by: FAMILY MEDICINE

## 2025-01-27 PROCEDURE — 1159F MED LIST DOCD IN RCRD: CPT | Mod: CPTII,,, | Performed by: FAMILY MEDICINE

## 2025-01-27 RX ORDER — OXYCODONE HYDROCHLORIDE 5 MG/1
10 CAPSULE ORAL EVERY 4 HOURS PRN
COMMUNITY

## 2025-01-27 RX ORDER — ONDANSETRON 4 MG/1
TABLET, ORALLY DISINTEGRATING ORAL
COMMUNITY
Start: 2025-01-24

## 2025-01-27 NOTE — PROGRESS NOTES
"CHIEF COMPLAINT: The patient presents hospital follow-up  HISTORY OF PRESENT ILLNESS: The patient presents for hospital follow-up.  At the very end of 2024 she underwent a 6+ hour open paulie.  Procedure started as a robotic lap paulie but quickly had to be abandoned due to extensive intra-abdominal adhesions.  There was apparently significant necrosis involving the gallbladder itself.  She has had a postoperative 30# wt loss.  She is disappointed in the pace of her recovery.  Stating that she is only Doing fair    She is having allergic rhinitis as well.    She works as a .    BP is good.     REVIEW OF SYSTEMS:  GENERAL: No fatigability or weight loss.  SKIN: No rashes, itching or changes in color or texture of skin.  HEAD: No headaches or recent head trauma.  EYES: Visual acuity fine. No photophobia, ocular pain or diplopia.  EARS: Denies ear pain, discharge or vertigo.  NOSE: No loss of smell, no epistaxis or postnasal drip.  MOUTH & THROAT: No hoarseness or change in voice. No excessive gum bleeding.  NODES: Denies swollen glands.  CHEST: Denies SANTOS, cyanosis, wheezing, and sputum production.  CARDIOVASCULAR: Denies chest pain, PND, orthopnea or reduced exercise tolerance.  ABDOMEN: Appetite fine. No weight loss. Denies diarrhea, abdominal pain, hematemesis or blood in stool.  URINARY: No flank pain, dysuria or hematuria.  PERIPHERAL VASCULAR: No claudication or cyanosis.  MUSCULOSKELETAL: No joint stiffness or swelling. Denies back pain. Except as above  NEUROLOGIC: No history of seizures, paralysis, alteration of gait or coordination.    SOCIAL HISTORY: Unchanged since recent note    PHYSICAL EXAMINATION:   Blood pressure 100/68, pulse 83, height 5' 1" (1.549 m), weight 99.1 kg (218 lb 7.6 oz), last menstrual period 12/03/2024, SpO2 100%.    APPEARANCE: Well nourished, well developed, in no acute distress.    HEAD: Normocephalic, atraumatic.  EYES: PERRL. EOMI.  Conjunctivae without injection and  " Anicteric  NOSE: Mucosa pink. Airway clear.  MOUTH & THROAT: No tonsillar enlargement. No pharyngeal erythema or exudate. No stridor.  NECK: Supple.   NODES: No cervical, axillary or inguinal lymph node enlargement.  CHEST: Lungs demonstrate scattered mild wheezes bilaterally.  No retractions are noted.  No rales or rhonchi are present.  CARDIOVASCULAR: Normal S1, S2. No rubs, murmurs or gallops.  ABDOMEN: Bowel sounds normal. Not distended. Soft. No tenderness or masses.  No ascites is noted.  MUSCULOSKELETAL:  There is no clubbing, cyanosis, or edema of the extremities x4.  There is full range of motion of the lumbar spine.  There is full range of motion of the extremities x4.  There is no deformity noted.    NEUROLOGIC:       Normal speech development.      Hearing normal.      Normal gait.      Motor and sensory exams grossly normal.  PSYCHIATRIC: Patient is alert and oriented x3.  Thought processes are all normal.  There is no homicidality.  There is no suicidality.  There is no evidence of psychosis.    LABORATORY/RADIOLOGY: Chart reviewed.    ASSESSMENT:   Acute necrotic cholecystitis  Open cholecystectomy  Incisional hernia 1 week postop w/SBO    HTN  AR  Chronic left lower back pain    PLAN:  She is reassured that with the passing of time she will get back to 100%  GYN  Continue omeprazole  [facility]  [facility]    RTC 1 year

## 2025-02-11 ENCOUNTER — OFFICE VISIT (OUTPATIENT)
Dept: SURGERY | Facility: CLINIC | Age: 55
End: 2025-02-11
Payer: MEDICAID

## 2025-02-11 VITALS — DIASTOLIC BLOOD PRESSURE: 76 MMHG | HEART RATE: 82 BPM | OXYGEN SATURATION: 100 % | SYSTOLIC BLOOD PRESSURE: 123 MMHG

## 2025-02-11 DIAGNOSIS — K80.10 CHRONIC CALCULOUS CHOLECYSTITIS: ICD-10-CM

## 2025-02-11 DIAGNOSIS — Z87.19 STATUS POST REPAIR OF VENTRAL HERNIA: Primary | ICD-10-CM

## 2025-02-11 DIAGNOSIS — Z90.49 STATUS POST CHOLECYSTECTOMY: ICD-10-CM

## 2025-02-11 DIAGNOSIS — Z98.890 STATUS POST REPAIR OF VENTRAL HERNIA: Primary | ICD-10-CM

## 2025-02-11 DIAGNOSIS — K80.00 ACUTE CALCULOUS CHOLECYSTITIS: ICD-10-CM

## 2025-02-11 PROCEDURE — 99213 OFFICE O/P EST LOW 20 MIN: CPT | Mod: PBBFAC | Performed by: SURGERY

## 2025-02-11 PROCEDURE — 1159F MED LIST DOCD IN RCRD: CPT | Mod: CPTII,,, | Performed by: SURGERY

## 2025-02-11 PROCEDURE — 99999 PR PBB SHADOW E&M-EST. PATIENT-LVL III: CPT | Mod: PBBFAC,,, | Performed by: SURGERY

## 2025-02-11 PROCEDURE — 3078F DIAST BP <80 MM HG: CPT | Mod: CPTII,,, | Performed by: SURGERY

## 2025-02-11 PROCEDURE — 99024 POSTOP FOLLOW-UP VISIT: CPT | Mod: ,,, | Performed by: SURGERY

## 2025-02-11 PROCEDURE — 1160F RVW MEDS BY RX/DR IN RCRD: CPT | Mod: CPTII,,, | Performed by: SURGERY

## 2025-02-11 PROCEDURE — 3074F SYST BP LT 130 MM HG: CPT | Mod: CPTII,,, | Performed by: SURGERY

## 2025-02-11 NOTE — PROGRESS NOTES
HPI:     Still some residual pain in the right upper quadrant incision.  Still does not feel 100% but getting stronger daily.  Does not feel she is ready to return to full duty  Normal urine color.  Bowels are passing well and no nausea or vomiting.    PHYSICAL EXAM:  Physical Exam     In NAD  Eyes nonicteric sclera  Incisions intact and without bulge.   Abdomen is soft nontender nondistended          ASSESSMENT:    The patient is doing well status post robotic converted to open cholecystectomy 12/30/2024 and and repair of incisional port site hernia with bowel obstruction 01/07/2025     No sign of  bile leak.      PLAN:    Still does not feel 100% but getting stronger daily.  Does not feel she is ready to return to full duty .  Okay to return to full duty work March 10, 2025.  I have offered a return visit but she chooses to return only if she has problems

## 2025-04-23 ENCOUNTER — HOSPITAL ENCOUNTER (OUTPATIENT)
Facility: OTHER | Age: 55
Discharge: HOME | End: 2025-04-25
Attending: INTERNAL MEDICINE | Admitting: INTERNAL MEDICINE
Payer: MEDICAID

## 2025-04-23 DIAGNOSIS — E80.6 HYPERBILIRUBINEMIA: ICD-10-CM

## 2025-04-23 DIAGNOSIS — R18.8 INTRA-ABDOMINAL FLUID COLLECTION: Primary | ICD-10-CM

## 2025-04-23 DIAGNOSIS — R74.01 TRANSAMINITIS: ICD-10-CM

## 2025-04-23 PROBLEM — Z87.19 HISTORY OF SMALL BOWEL OBSTRUCTION: Status: ACTIVE | Noted: 2025-04-23

## 2025-04-23 PROBLEM — Z98.890 HISTORY OF INCISIONAL HERNIA REPAIR: Status: ACTIVE | Noted: 2025-04-23

## 2025-04-23 PROBLEM — Z87.19 HISTORY OF INCISIONAL HERNIA REPAIR: Status: ACTIVE | Noted: 2025-04-23

## 2025-04-23 PROBLEM — Z90.49 S/P CHOLECYSTECTOMY: Status: ACTIVE | Noted: 2025-04-23

## 2025-04-23 LAB
ABSOLUTE EOSINOPHIL (OHS): 0.05 K/UL
ABSOLUTE MONOCYTE (OHS): 0.54 K/UL (ref 0.3–1)
ABSOLUTE NEUTROPHIL COUNT (OHS): 3.25 K/UL (ref 1.8–7.7)
ALBUMIN SERPL BCP-MCNC: 3.6 G/DL (ref 3.5–5.2)
ALP SERPL-CCNC: 357 UNIT/L (ref 40–150)
ALT SERPL W/O P-5'-P-CCNC: 557 UNIT/L (ref 10–44)
ANION GAP (OHS): 9 MMOL/L (ref 8–16)
AST SERPL-CCNC: 279 UNIT/L (ref 11–45)
BACTERIA #/AREA URNS AUTO: ABNORMAL /HPF
BASOPHILS # BLD AUTO: 0.03 K/UL
BASOPHILS NFR BLD AUTO: 0.5 %
BILIRUB SERPL-MCNC: 1.4 MG/DL (ref 0.1–1)
BILIRUB UR QL STRIP.AUTO: ABNORMAL
BUN SERPL-MCNC: 5 MG/DL (ref 6–20)
CALCIUM SERPL-MCNC: 10.2 MG/DL (ref 8.7–10.5)
CHLORIDE SERPL-SCNC: 104 MMOL/L (ref 95–110)
CK SERPL-CCNC: 90 U/L (ref 20–180)
CLARITY UR: CLEAR
CO2 SERPL-SCNC: 25 MMOL/L (ref 23–29)
COLOR UR AUTO: YELLOW
CREAT SERPL-MCNC: 0.7 MG/DL (ref 0.5–1.4)
ERYTHROCYTE [DISTWIDTH] IN BLOOD BY AUTOMATED COUNT: 12.9 % (ref 11.5–14.5)
GFR SERPLBLD CREATININE-BSD FMLA CKD-EPI: >60 ML/MIN/1.73/M2
GLUCOSE SERPL-MCNC: 82 MG/DL (ref 70–110)
GLUCOSE UR QL STRIP: NEGATIVE
HCT VFR BLD AUTO: 43.8 % (ref 37–48.5)
HGB BLD-MCNC: 14.2 GM/DL (ref 12–16)
HGB UR QL STRIP: NEGATIVE
HOLD SPECIMEN: NORMAL
IMM GRANULOCYTES # BLD AUTO: 0.02 K/UL (ref 0–0.04)
IMM GRANULOCYTES NFR BLD AUTO: 0.3 % (ref 0–0.5)
KETONES UR QL STRIP: ABNORMAL
LACTATE SERPL-SCNC: 1.1 MMOL/L (ref 0.5–2.2)
LEUKOCYTE ESTERASE UR QL STRIP: ABNORMAL
LIPASE SERPL-CCNC: 22 U/L (ref 4–60)
LYMPHOCYTES # BLD AUTO: 1.89 K/UL (ref 1–4.8)
MCH RBC QN AUTO: 28.2 PG (ref 27–31)
MCHC RBC AUTO-ENTMCNC: 32.4 G/DL (ref 32–36)
MCV RBC AUTO: 87 FL (ref 82–98)
MICROSCOPIC COMMENT: ABNORMAL
NITRITE UR QL STRIP: NEGATIVE
NUCLEATED RBC (/100WBC) (OHS): 0 /100 WBC
PH UR STRIP: 7 [PH]
PLATELET # BLD AUTO: 366 K/UL (ref 150–450)
PMV BLD AUTO: 9.1 FL (ref 9.2–12.9)
POTASSIUM SERPL-SCNC: 4.5 MMOL/L (ref 3.5–5.1)
PROCALCITONIN SERPL-MCNC: 0.11 NG/ML
PROT SERPL-MCNC: 8.6 GM/DL (ref 6–8.4)
PROT UR QL STRIP: ABNORMAL
RBC # BLD AUTO: 5.04 M/UL (ref 4–5.4)
RBC #/AREA URNS AUTO: 7 /HPF (ref 0–4)
RELATIVE EOSINOPHIL (OHS): 0.9 %
RELATIVE LYMPHOCYTE (OHS): 32.7 % (ref 18–48)
RELATIVE MONOCYTE (OHS): 9.3 % (ref 4–15)
RELATIVE NEUTROPHIL (OHS): 56.3 % (ref 38–73)
SODIUM SERPL-SCNC: 138 MMOL/L (ref 136–145)
SP GR UR STRIP: 1.02
SQUAMOUS #/AREA URNS AUTO: 4 /HPF
UROBILINOGEN UR STRIP-ACNC: ABNORMAL EU/DL
WBC # BLD AUTO: 5.78 K/UL (ref 3.9–12.7)
WBC #/AREA URNS AUTO: 6 /HPF (ref 0–5)

## 2025-04-23 PROCEDURE — 63600175 PHARM REV CODE 636 W HCPCS

## 2025-04-23 PROCEDURE — 63600175 PHARM REV CODE 636 W HCPCS: Performed by: NURSE PRACTITIONER

## 2025-04-23 PROCEDURE — 96361 HYDRATE IV INFUSION ADD-ON: CPT

## 2025-04-23 PROCEDURE — G0378 HOSPITAL OBSERVATION PER HR: HCPCS

## 2025-04-23 PROCEDURE — 82374 ASSAY BLOOD CARBON DIOXIDE: CPT

## 2025-04-23 PROCEDURE — 25000003 PHARM REV CODE 250

## 2025-04-23 PROCEDURE — 99285 EMERGENCY DEPT VISIT HI MDM: CPT | Mod: 25

## 2025-04-23 PROCEDURE — 83605 ASSAY OF LACTIC ACID: CPT | Performed by: NURSE PRACTITIONER

## 2025-04-23 PROCEDURE — 25000003 PHARM REV CODE 250: Performed by: NURSE PRACTITIONER

## 2025-04-23 PROCEDURE — 96372 THER/PROPH/DIAG INJ SC/IM: CPT | Performed by: NURSE PRACTITIONER

## 2025-04-23 PROCEDURE — 85025 COMPLETE CBC W/AUTO DIFF WBC: CPT

## 2025-04-23 PROCEDURE — 25500020 PHARM REV CODE 255: Performed by: INTERNAL MEDICINE

## 2025-04-23 PROCEDURE — 83690 ASSAY OF LIPASE: CPT

## 2025-04-23 PROCEDURE — 96365 THER/PROPH/DIAG IV INF INIT: CPT

## 2025-04-23 PROCEDURE — 84145 PROCALCITONIN (PCT): CPT | Performed by: NURSE PRACTITIONER

## 2025-04-23 PROCEDURE — 81003 URINALYSIS AUTO W/O SCOPE: CPT

## 2025-04-23 PROCEDURE — 82550 ASSAY OF CK (CPK): CPT | Performed by: NURSE PRACTITIONER

## 2025-04-23 RX ORDER — ONDANSETRON 8 MG/1
8 TABLET, ORALLY DISINTEGRATING ORAL EVERY 8 HOURS PRN
Status: DISCONTINUED | OUTPATIENT
Start: 2025-04-23 | End: 2025-04-25 | Stop reason: HOSPADM

## 2025-04-23 RX ORDER — PANTOPRAZOLE SODIUM 40 MG/1
40 TABLET, DELAYED RELEASE ORAL DAILY
Status: DISCONTINUED | OUTPATIENT
Start: 2025-04-24 | End: 2025-04-25 | Stop reason: HOSPADM

## 2025-04-23 RX ORDER — ENOXAPARIN SODIUM 100 MG/ML
40 INJECTION SUBCUTANEOUS EVERY 24 HOURS
Status: DISCONTINUED | OUTPATIENT
Start: 2025-04-23 | End: 2025-04-25 | Stop reason: HOSPADM

## 2025-04-23 RX ORDER — ACETAMINOPHEN 325 MG/1
650 TABLET ORAL EVERY 4 HOURS PRN
Status: DISCONTINUED | OUTPATIENT
Start: 2025-04-23 | End: 2025-04-25 | Stop reason: HOSPADM

## 2025-04-23 RX ORDER — ONDANSETRON 4 MG/1
4 TABLET, ORALLY DISINTEGRATING ORAL
Status: COMPLETED | OUTPATIENT
Start: 2025-04-23 | End: 2025-04-23

## 2025-04-23 RX ORDER — ONDANSETRON HYDROCHLORIDE 2 MG/ML
8 INJECTION, SOLUTION INTRAVENOUS EVERY 6 HOURS PRN
Status: DISCONTINUED | OUTPATIENT
Start: 2025-04-23 | End: 2025-04-25 | Stop reason: HOSPADM

## 2025-04-23 RX ORDER — SODIUM CHLORIDE 9 MG/ML
INJECTION, SOLUTION INTRAVENOUS CONTINUOUS
Status: DISCONTINUED | OUTPATIENT
Start: 2025-04-23 | End: 2025-04-24

## 2025-04-23 RX ORDER — SODIUM CHLORIDE 0.9 % (FLUSH) 0.9 %
10 SYRINGE (ML) INJECTION
Status: DISCONTINUED | OUTPATIENT
Start: 2025-04-23 | End: 2025-04-25 | Stop reason: HOSPADM

## 2025-04-23 RX ORDER — MORPHINE SULFATE 2 MG/ML
2 INJECTION, SOLUTION INTRAMUSCULAR; INTRAVENOUS EVERY 4 HOURS PRN
Status: DISCONTINUED | OUTPATIENT
Start: 2025-04-23 | End: 2025-04-25

## 2025-04-23 RX ORDER — HYDROCODONE BITARTRATE AND ACETAMINOPHEN 5; 325 MG/1; MG/1
1 TABLET ORAL EVERY 4 HOURS PRN
Refills: 0 | Status: DISCONTINUED | OUTPATIENT
Start: 2025-04-23 | End: 2025-04-25 | Stop reason: HOSPADM

## 2025-04-23 RX ORDER — IBUPROFEN 400 MG/1
800 TABLET ORAL
Status: COMPLETED | OUTPATIENT
Start: 2025-04-23 | End: 2025-04-23

## 2025-04-23 RX ORDER — TALC
6 POWDER (GRAM) TOPICAL NIGHTLY PRN
Status: DISCONTINUED | OUTPATIENT
Start: 2025-04-23 | End: 2025-04-25 | Stop reason: HOSPADM

## 2025-04-23 RX ADMIN — ONDANSETRON 4 MG: 4 TABLET, ORALLY DISINTEGRATING ORAL at 02:04

## 2025-04-23 RX ADMIN — IOHEXOL 100 ML: 350 INJECTION, SOLUTION INTRAVENOUS at 04:04

## 2025-04-23 RX ADMIN — PIPERACILLIN SODIUM AND TAZOBACTAM SODIUM 4.5 G: 4; .5 INJECTION, POWDER, FOR SOLUTION INTRAVENOUS at 08:04

## 2025-04-23 RX ADMIN — ENOXAPARIN SODIUM 40 MG: 40 INJECTION SUBCUTANEOUS at 08:04

## 2025-04-23 RX ADMIN — HYDROCODONE BITARTRATE AND ACETAMINOPHEN 1 TABLET: 5; 325 TABLET ORAL at 10:04

## 2025-04-23 RX ADMIN — SODIUM CHLORIDE: 9 INJECTION, SOLUTION INTRAVENOUS at 08:04

## 2025-04-23 RX ADMIN — IBUPROFEN 800 MG: 400 TABLET ORAL at 05:04

## 2025-04-23 NOTE — FIRST PROVIDER EVALUATION
Medical screening examination initiated.  I have conducted a focused provider triage encounter, findings are as follows:    Brief history of present illness:epigastric abdominal pain X 3 days. Uncomplicated Gallbladder surgery 4 months ago. States she is on omeprazole but only takes a few times a week. Also reports urinary frequency/hesitancy.    Vitals:    04/23/25 1215 04/23/25 1216   BP: (!) 125/92    BP Location: Left arm    Pulse: 77    Resp: 18    Temp:  97.9 °F (36.6 °C)   TempSrc:  Oral   SpO2: 98%    Weight: 108.9 kg (240 lb)        Pertinent physical exam:      Brief workup plan:  labs, UA    Preliminary workup initiated; this workup will be continued and followed by the physician or advanced practice provider that is assigned to the patient when roomed.

## 2025-04-23 NOTE — Clinical Note
"Nadir Wisemanari Abernathy was seen and treated in our emergency department on 4/23/2025.  She may return to work on 04/24/2025.       If you have any questions or concerns, please don't hesitate to call.      Charley Lawson MD"

## 2025-04-23 NOTE — ED PROVIDER NOTES
Encounter Date: 4/23/2025       History     Chief Complaint   Patient presents with    Abdominal Pain     Reports abd pain onset Sunday. Pt endorses recent gallbladder removal 4 months ago. +nausea      A 54-year-old female S/P cholecystectomy on (12/30/24) and both a hernia repair and small bowel obstruction (on 1/7/25) coming in for abdominal pain that started on Sunday, x3days.  State that the abdominal pain feels sharp and intermittent.  Patient took a Dulcolax yesterday because she thought she was constipated. She felt better afterwards, but the pain came back.  Endorses some nausea.  No vomiting, diarrhea, fevers, body aches, or chills.  No shortness a breath or chest pain.  Patient usually takes 500mg of Tylenol 5 days a week for her post-surgical pain.  Endorses drinking a cup of wine or bourbon twice a week.  Denies any drug usage.        Review of patient's allergies indicates:   Allergen Reactions    Shellfish containing products Anaphylaxis     Past Medical History:   Diagnosis Date    Fibroids 1995     Past Surgical History:   Procedure Laterality Date    CHOLECYSTECTOMY N/A 12/30/2024    Procedure: CHOLECYSTECTOMY;  Surgeon: Chirag Acuna MD;  Location: [facility];  Service: General;  Laterality: N/A;    REPAIR, HERNIA, INCISIONAL N/A 1/7/2025    Procedure: REPAIR, HERNIA, INCISIONAL;  Surgeon: Chirag Acuna MD;  Location: [facility] OR;  Service: General;  Laterality: N/A;    ROBOT-ASSISTED CHOLECYSTECTOMY N/A 12/30/2024    Procedure: ROBOTIC CHOLECYSTECTOMY, attempted & converted to open;  Surgeon: Chirag Acuna MD;  Location: [facility] OR;  Service: General;  Laterality: N/A;    UTERINE FIBROID SURGERY       Family History   Problem Relation Name Age of Onset    Diabetes Father      Breast cancer Neg Hx      Ovarian cancer Neg Hx       Social History[1]  Review of Systems   Constitutional:  Negative for fever.   HENT:  Negative for sore throat.    Respiratory:  Negative for shortness of breath.     Cardiovascular:  Negative for chest pain.   Gastrointestinal:  Positive for abdominal pain and nausea. Negative for abdominal distention, diarrhea and vomiting.   Genitourinary:  Negative for dysuria.   Skin:  Negative for rash.       Physical Exam     Initial Vitals   BP Pulse Resp Temp SpO2   04/23/25 1215 04/23/25 1215 04/23/25 1215 04/23/25 1216 04/23/25 1215   (!) 125/92 77 18 97.9 °F (36.6 °C) 98 %      MAP       --                Physical Exam    Constitutional: She appears well-developed and well-nourished.   HENT:   Head: Normocephalic and atraumatic.   Eyes: EOM are normal.   Neck:   Normal range of motion.  Cardiovascular:  Normal rate and regular rhythm.           Pulmonary/Chest: Breath sounds normal.   Abdominal: Abdomen is soft. She exhibits no distension and no mass. There is abdominal tenderness in the epigastric area. There is no rebound, no guarding and negative Aguilera's sign.   Musculoskeletal:      Cervical back: Normal range of motion.     Neurological: She is alert and oriented to person, place, and time. GCS score is 15. GCS eye subscore is 4. GCS verbal subscore is 5. GCS motor subscore is 6.   Skin: Skin is warm. Capillary refill takes less than 2 seconds.         ED Course   Procedures  Labs Reviewed   COMPREHENSIVE METABOLIC PANEL - Abnormal       Result Value    Sodium 138      Potassium 4.5      Chloride 104      CO2 25      Glucose 82      BUN 5 (*)     Creatinine 0.7      Calcium 10.2      Protein Total 8.6 (*)     Albumin 3.6      Bilirubin Total 1.4 (*)      (*)      (*)      (*)     Anion Gap 9      eGFR >60     URINALYSIS, REFLEX TO URINE CULTURE - Abnormal    Color, UA Yellow      Appearance, UA Clear      pH, UA 7.0      Spec Grav UA 1.025      Protein, UA Trace (*)     Glucose, UA Negative      Ketones, UA Trace (*)     Bilirubin, UA 1+ (*)     Blood, UA Negative      Nitrites, UA Negative      Urobilinogen, UA 2.0-3.0 (*)     Leukocyte Esterase, UA 3+  (*)    CBC WITH DIFFERENTIAL - Abnormal    WBC 5.78      RBC 5.04      HGB 14.2      HCT 43.8      MCV 87      MCH 28.2      MCHC 32.4      RDW 12.9      Platelet Count 366      MPV 9.1 (*)     Nucleated RBC 0      Neut % 56.3      Lymph % 32.7      Mono % 9.3      Eos % 0.9      Basophil % 0.5      Imm Grans % 0.3      Neut # 3.25      Lymph # 1.89      Mono # 0.54      Eos # 0.05      Baso # 0.03      Imm Grans # 0.02     URINALYSIS MICROSCOPIC - Abnormal    RBC, UA 7 (*)     WBC, UA 6 (*)     Bacteria, UA Rare      Squamous Epithelial Cells, UA 4      Microscopic Comment       LIPASE - Normal    Lipase Level 22     PROCALCITONIN - Normal    Procalcitonin 0.11     CBC W/ AUTO DIFFERENTIAL    Narrative:     The following orders were created for panel order CBC W/ AUTO DIFFERENTIAL.  Procedure                               Abnormality         Status                     ---------                               -----------         ------                     CBC with Differential[[id number]]       Abnormal            Final result                 Please view results for these tests on the individual orders.   GREY TOP URINE HOLD    Extra Tube Hold for add-ons.     LACTIC ACID, PLASMA   CK          Imaging Results              CT Abdomen Pelvis With IV Contrast NO Oral Contrast (Final result)  Result time 04/23/25 18:02:15      Final result by Idalia Long MD (04/23/25 18:02:15)                   Impression:      Post cholecystectomy with a fluid collection in the gallbladder bed, which may represent a biloma, seroma, evolved hematoma, abscess or other etiology.  Extrahepatic biliary ductal dilatation.    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    Fibroid uterus.    Colonic diverticulosis.      Electronically signed by: Idalia Long  Date:    04/23/2025  Time:    18:02               Narrative:    EXAMINATION:  CT OF ABDOMEN PELVIS WITH    CLINICAL HISTORY:  Abdominal abscess/infection  suspected;    TECHNIQUE:  5 mm enhanced axial images were obtained from the lung bases through the greater trochanters.  100 mL of Omnipaque 350 was injected.    COMPARISON:  01/07/2025    FINDINGS:  The liver, spleen, pancreas, right kidney and adrenal glands are unremarkable.    There is a subcentimeter fat containing left renal cortical lesion.    The gallbladder is surgically absent.  There is a 2.4 x 1.8 cm focal fluid collection in the gallbladder fossa.  There is mild dilatation of the extrahepatic biliary duct, which measures approximately 12 mm.    There is no definite evidence for abdominal adenopathy or ascites.    There is a small fat containing umbilical hernia containing a knuckle of nonobstructing small bowel.    There is a heterogeneous lobular uterus, which exerts mild mass effect on the urinary bladder.  Colonic diverticulosis is present.  The appendix is not inflamed.    There is trace free fluid in the pelvis.    There is mild bibasilar atelectasis.                                       US Abdomen Limited (Gallbladder) (Final result)  Result time 04/23/25 15:35:21      Final result by Doug De Dios III, MD (04/23/25 15:35:21)                   Impression:      Complex cystic area in the gallbladder fossa status post gallbladder removal.  This could be a hematoma seroma.  A bile leak may be less likely.      Electronically signed by: Doug De Dios MD  Date:    04/23/2025  Time:    15:35               Narrative:    EXAMINATION:  US ABDOMEN LIMITED    CLINICAL HISTORY:  Elevation of levels of liver transaminase levels    FINDINGS:  Pancreas demonstrates no abnormality.  The gallbladder is been removed.  There is a cystic areas seen in the gallbladder fossa.  There is normal appearing bile duct measuring 6 mm.  No intrahepatic bile duct dilatation is seen.  Patient did not receive pain medication.                                       Medications   piperacillin-tazobactam (ZOSYN) 4.5 g in D5W  100 mL IVPB (MB+) (has no administration in time range)   pantoprazole EC tablet 40 mg (has no administration in time range)   sodium chloride 0.9% flush 10 mL (has no administration in time range)   enoxaparin injection 40 mg (has no administration in time range)   acetaminophen tablet 650 mg (has no administration in time range)   HYDROcodone-acetaminophen 5-325 mg per tablet 1 tablet (has no administration in time range)   morphine injection 2 mg (has no administration in time range)   ondansetron disintegrating tablet 8 mg (has no administration in time range)   ondansetron injection 8 mg (has no administration in time range)   melatonin tablet 6 mg (has no administration in time range)   piperacillin-tazobactam (ZOSYN) 4.5 g in D5W 100 mL IVPB (MB+) (has no administration in time range)   0.9% NaCl infusion (has no administration in time range)   ondansetron disintegrating tablet 4 mg (4 mg Oral Given 4/23/25 1440)   iohexoL (OMNIPAQUE 350) injection 100 mL (100 mLs Intravenous Given 4/23/25 1654)   ibuprofen tablet 800 mg (800 mg Oral Given 4/23/25 1735)     Medical Decision Making  Patient is afebrile, nontoxic appearing 54 y.o. female. Abdominal pain is localized to the midepigastric region. There is not guarding, rebound, distension, rigidity, or decreased bowel sounds. Denies chest pain and shortness of breath, rashes. VSS and not suggestive of sepsis. Tolerating PO. Details of ED course documented in ED workup.      Differential diagnosis I have considered:  Gastritis, bowel obstruction, pancreatitis, liver abscess, hernia    CT scan shows a nonobstructive umbilical hernia and traces of the free fluid in gallbladder fossa, that could be seroma, biloma, hematoma, or an abscess. Lab shows transaminitis with no leukocytosis.  I have contacted General surgeon Dr. Sims whom suggests we should admit the patient overnight for a repeated lab work and be seen by Dr. Acuna in the morning for further  management.  I have contacted hospital medicine who agrees to admit the patient to observation for transaminitis and abdominal pain r/o internal abscess.    Amount and/or Complexity of Data Reviewed  Labs:  Decision-making details documented in ED Course.  Radiology: ordered. Decision-making details documented in ED Course.    Risk  Prescription drug management.  Decision regarding hospitalization.               ED Course as of 04/23/25 1938 Wed Apr 23, 2025   1432 Comp. Metabolic Panel(!)  Elevated liver enzymes  [TN]   1447 Urinalysis, Reflex to Urine Culture Urine, Clean Catch(!) [TN]  Not remarkable for UTI as of this moment.  Patient does not have any urinary symptoms. Urine culture pending   1526 US Abdomen Limited (Gallbladder) [TN]   1541 US Abdomen Limited (Gallbladder)  Complex cystic area in the gallbladder fossa status post gallbladder removal.  This could be a hematoma seroma.  A bile leak may be less likely. [TN]   1616 CT Abdomen Pelvis With IV Contrast NO Oral Contrast [TN]   1808 CT Abdomen Pelvis With IV Contrast NO Oral Contrast  Post cholecystectomy with a fluid collection in the gallbladder bed, which may represent a biloma, seroma, evolved hematoma, abscess or other etiology.  Extrahepatic biliary ductal dilatation. [TN]   1820 Consulted Dr. Sims states that patient patient can be placed in observation for a morning lab recheck. He will contact Dr. Acuna to see her tomorrow morning [TN]      ED Course User Index  [TN] Tim Corado PA-C                           Clinical Impression:  Final diagnoses:  [R74.01] Transaminitis          ED Disposition Condition    Observation                     [1]   Social History  Tobacco Use    Smoking status: Never    Smokeless tobacco: Never   Substance Use Topics    Alcohol use: Yes     Alcohol/week: 0.0 standard drinks of alcohol    Drug use: No        Tim Corado PA-C  04/23/25 2026       Tim Corado PA-C  04/23/25 2055        Tim Corado, YANG  04/23/25 2110

## 2025-04-23 NOTE — ED TRIAGE NOTES
"Upper abdominal pain off and on since Sunday. No improvement after taking laxative followed by BM on Monday. Denies fever. + nausea. States s/p GB surgery in Dec 2024 followed by "hernia repair" in January to treat intractable N/V. + urinary frequency without dysuria. Presents awake, alert.  "

## 2025-04-24 PROBLEM — E80.6 HYPERBILIRUBINEMIA: Status: ACTIVE | Noted: 2025-04-24

## 2025-04-24 LAB
ABSOLUTE EOSINOPHIL (OHS): 0.08 K/UL
ABSOLUTE MONOCYTE (OHS): 0.55 K/UL (ref 0.3–1)
ABSOLUTE NEUTROPHIL COUNT (OHS): 2.63 K/UL (ref 1.8–7.7)
ALBUMIN SERPL BCP-MCNC: 3.1 G/DL (ref 3.5–5.2)
ALP SERPL-CCNC: 321 UNIT/L (ref 40–150)
ALT SERPL W/O P-5'-P-CCNC: 414 UNIT/L (ref 10–44)
ANION GAP (OHS): 10 MMOL/L (ref 8–16)
AST SERPL-CCNC: 168 UNIT/L (ref 11–45)
BASOPHILS # BLD AUTO: 0.05 K/UL
BASOPHILS NFR BLD AUTO: 0.8 %
BILIRUB SERPL-MCNC: 0.8 MG/DL (ref 0.1–1)
BUN SERPL-MCNC: 8 MG/DL (ref 6–20)
CALCIUM SERPL-MCNC: 9.7 MG/DL (ref 8.7–10.5)
CHLORIDE SERPL-SCNC: 106 MMOL/L (ref 95–110)
CO2 SERPL-SCNC: 22 MMOL/L (ref 23–29)
CREAT SERPL-MCNC: 0.7 MG/DL (ref 0.5–1.4)
ERYTHROCYTE [DISTWIDTH] IN BLOOD BY AUTOMATED COUNT: 12.7 % (ref 11.5–14.5)
GFR SERPLBLD CREATININE-BSD FMLA CKD-EPI: >60 ML/MIN/1.73/M2
GLUCOSE SERPL-MCNC: 81 MG/DL (ref 70–110)
HCT VFR BLD AUTO: 42.1 % (ref 37–48.5)
HGB BLD-MCNC: 13.2 GM/DL (ref 12–16)
IMM GRANULOCYTES # BLD AUTO: 0.02 K/UL (ref 0–0.04)
IMM GRANULOCYTES NFR BLD AUTO: 0.3 % (ref 0–0.5)
LYMPHOCYTES # BLD AUTO: 2.65 K/UL (ref 1–4.8)
MAGNESIUM SERPL-MCNC: 2 MG/DL (ref 1.6–2.6)
MCH RBC QN AUTO: 27.8 PG (ref 27–31)
MCHC RBC AUTO-ENTMCNC: 31.4 G/DL (ref 32–36)
MCV RBC AUTO: 89 FL (ref 82–98)
NUCLEATED RBC (/100WBC) (OHS): 0 /100 WBC
PLATELET # BLD AUTO: 321 K/UL (ref 150–450)
PMV BLD AUTO: 9.1 FL (ref 9.2–12.9)
POTASSIUM SERPL-SCNC: 4 MMOL/L (ref 3.5–5.1)
PROT SERPL-MCNC: 7.5 GM/DL (ref 6–8.4)
RBC # BLD AUTO: 4.75 M/UL (ref 4–5.4)
RELATIVE EOSINOPHIL (OHS): 1.3 %
RELATIVE LYMPHOCYTE (OHS): 44.3 % (ref 18–48)
RELATIVE MONOCYTE (OHS): 9.2 % (ref 4–15)
RELATIVE NEUTROPHIL (OHS): 44.1 % (ref 38–73)
SODIUM SERPL-SCNC: 138 MMOL/L (ref 136–145)
WBC # BLD AUTO: 5.98 K/UL (ref 3.9–12.7)

## 2025-04-24 PROCEDURE — 99214 OFFICE O/P EST MOD 30 MIN: CPT | Mod: ,,, | Performed by: SURGERY

## 2025-04-24 PROCEDURE — G0378 HOSPITAL OBSERVATION PER HR: HCPCS

## 2025-04-24 PROCEDURE — 83735 ASSAY OF MAGNESIUM: CPT | Performed by: NURSE PRACTITIONER

## 2025-04-24 PROCEDURE — 63600175 PHARM REV CODE 636 W HCPCS: Performed by: NURSE PRACTITIONER

## 2025-04-24 PROCEDURE — 85025 COMPLETE CBC W/AUTO DIFF WBC: CPT | Performed by: NURSE PRACTITIONER

## 2025-04-24 PROCEDURE — 96372 THER/PROPH/DIAG INJ SC/IM: CPT | Performed by: NURSE PRACTITIONER

## 2025-04-24 PROCEDURE — 80053 COMPREHEN METABOLIC PANEL: CPT | Performed by: NURSE PRACTITIONER

## 2025-04-24 PROCEDURE — 96361 HYDRATE IV INFUSION ADD-ON: CPT

## 2025-04-24 PROCEDURE — 25000003 PHARM REV CODE 250: Performed by: NURSE PRACTITIONER

## 2025-04-24 PROCEDURE — 96375 TX/PRO/DX INJ NEW DRUG ADDON: CPT

## 2025-04-24 PROCEDURE — 96366 THER/PROPH/DIAG IV INF ADDON: CPT

## 2025-04-24 PROCEDURE — 36415 COLL VENOUS BLD VENIPUNCTURE: CPT | Performed by: NURSE PRACTITIONER

## 2025-04-24 PROCEDURE — 25000003 PHARM REV CODE 250: Performed by: INTERNAL MEDICINE

## 2025-04-24 RX ORDER — LORAZEPAM 0.5 MG/1
0.5 TABLET ORAL ONCE AS NEEDED
Status: COMPLETED | OUTPATIENT
Start: 2025-04-24 | End: 2025-04-24

## 2025-04-24 RX ADMIN — PANTOPRAZOLE SODIUM 40 MG: 40 TABLET, DELAYED RELEASE ORAL at 08:04

## 2025-04-24 RX ADMIN — MORPHINE SULFATE 2 MG: 2 INJECTION, SOLUTION INTRAMUSCULAR; INTRAVENOUS at 09:04

## 2025-04-24 RX ADMIN — ONDANSETRON 8 MG: 8 TABLET, ORALLY DISINTEGRATING ORAL at 04:04

## 2025-04-24 RX ADMIN — PIPERACILLIN AND TAZOBACTAM 4.5 G: 4; .5 INJECTION, POWDER, LYOPHILIZED, FOR SOLUTION INTRAVENOUS at 12:04

## 2025-04-24 RX ADMIN — PIPERACILLIN AND TAZOBACTAM 4.5 G: 4; .5 INJECTION, POWDER, LYOPHILIZED, FOR SOLUTION INTRAVENOUS at 03:04

## 2025-04-24 RX ADMIN — ACETAMINOPHEN 650 MG: 325 TABLET, FILM COATED ORAL at 04:04

## 2025-04-24 RX ADMIN — PIPERACILLIN AND TAZOBACTAM 4.5 G: 4; .5 INJECTION, POWDER, LYOPHILIZED, FOR SOLUTION INTRAVENOUS at 08:04

## 2025-04-24 RX ADMIN — LORAZEPAM 0.5 MG: 0.5 TABLET ORAL at 01:04

## 2025-04-24 RX ADMIN — ENOXAPARIN SODIUM 40 MG: 40 INJECTION SUBCUTANEOUS at 04:04

## 2025-04-24 NOTE — ASSESSMENT & PLAN NOTE
- Gallbladder fossa fluid collection of uncertain etiology in setting of prior surgical history.  - Continue piperacillin-tazobactam, monitor cultures / for fever.  - Surgery consulted; appreciate assistance. MRCP today.  - Symptomatic management.  - Advance diet as tolerated, trend labs.

## 2025-04-24 NOTE — PROGRESS NOTES
Examined patient and took history.    Elevated transaminases including T bilirubin and fluid collection in the gallbladder fossa  I did not see any extrinsic pressure on the common bile duct from the collection but her common bile duct is dilated.  I have ordered MRCP.    Full consult to follow

## 2025-04-24 NOTE — CONSULTS
[facility] - Emergency Dept (Observation)  General Surgery  Consult Note    Date of Consultation:4/24/2025    SUBJECTIVE:     History of Present Illness:  Patient is a 54 y.o. female presented to the ER with epigastric pain.  Pain started Sunday.  She took Dulcolax stool softeners and had bowel movements and felt felt better.  Pain came and went over 2 days and was crampy in nature.  Currently she does not report any pain or nausea.    On admission she had elevated bilirubin and transaminases both of which are trending down now.  CT scan showed fluid collection in the gallbladder fossa and a slightly dilated common bile duct at 12 mm.    Pertinently, she had robotic cholecystectomy converted to open procedure on 12/30/2024.  Conversion due to inability to remove the gallbladder due to dense inflammation about the infundibulum which was eventually mobilized and closed.  She was discharged and went home but came back a few days later with a bowel obstruction from herniation of small bowel through the central port site.  This area was re-explored and bowel was healthy reduced and the defect closed.    Chief Complaint   Patient presents with    Abdominal Pain     Reports abd pain onset Sunday. Pt endorses recent gallbladder removal 4 months ago. +nausea        Review of patient's allergies indicates:   Allergen Reactions    Shellfish containing products Anaphylaxis       Current Medications[1]    Past Medical History:   Diagnosis Date    Fibroids 1995     Past Surgical History:   Procedure Laterality Date    CHOLECYSTECTOMY N/A 12/30/2024    Procedure: CHOLECYSTECTOMY;  Surgeon: Chirag Acuna MD;  [facility]: [facility];  Service: General;  Laterality: N/A;    REPAIR, HERNIA, INCISIONAL N/A 1/7/2025    Procedure: REPAIR, HERNIA, INCISIONAL;  Surgeon: Chirag Acuna MD;  Location: [facility] OR;  Service: General;  Laterality: N/A;    ROBOT-ASSISTED CHOLECYSTECTOMY N/A 12/30/2024    Procedure: ROBOTIC CHOLECYSTECTOMY, attempted &  converted to open;  Surgeon: Chirag Acuna MD;  Location: [facility];  Service: General;  Laterality: N/A;    UTERINE FIBROID SURGERY       Family History   Problem Relation Name Age of Onset    Diabetes Father      Breast cancer Neg Hx      Ovarian cancer Neg Hx       Social History[2]     Review of Systems:  Review of Systems   Constitutional:  Negative for appetite change, fatigue, fever and unexpected weight change.   HENT:  Negative for sore throat and trouble swallowing.    Eyes: Negative.    Respiratory:  Negative for cough, shortness of breath and wheezing.    Cardiovascular:  Negative for chest pain and leg swelling.   Gastrointestinal:  Negative for abdominal distention, abdominal pain, blood in stool, constipation, diarrhea, nausea and vomiting.   Endocrine: Negative.    Genitourinary: Negative.    Musculoskeletal:  Negative for back pain.   Skin: Negative.  Negative for rash.   Allergic/Immunologic: Negative.    Neurological: Negative.    Hematological: Negative.    Psychiatric/Behavioral:  Negative for confusion.      See HPI  OBJECTIVE:     Vital Signs (Most Recent)  Temp: 98.3 °F (36.8 °C) (04/24/25 1227)  Pulse: 67 (04/24/25 1227)  Resp: 16 (04/24/25 1227)  BP: 131/74 (04/24/25 1227)  SpO2: 97 % (04/24/25 1227)     108.9 kg (240 lb)     Physical Exam:  Physical Exam  General no apparent distress   Eyes nonicteric sclera   Abdomen is soft nontender nondistended.  No bulge at any of the incisions.  Laboratory    CBC:   Recent Labs   Lab 04/24/25  0326   WBC 5.98   RBC 4.75   HGB 13.2   HCT 42.1      MCV 89   MCH 27.8   MCHC 31.4*     CMP:   Recent Labs   Lab 04/24/25  0326   CALCIUM 9.7   ALBUMIN 3.1*      K 4.0   CO2 22*      BUN 8   CREATININE 0.7   ALKPHOS 321*   *   *   BILITOT 0.8     Diagnostic Results:    CT: I have reviewed all pertinent results/findings within the past 24 hours.  See HPI  ASSESSMENT/PLAN:     Transaminitis and hyperbilirubinemia both trending  down currently    There was a fluid collection which has been present since 1 week after the procedure. CT scan , this admission showed fluid collection in the gallbladder fossa and a slightly dilated common bile duct at 12 mm.  No extrinsic compression on the common bile duct is seen by the fluid collection.    No leukocytosis.  Patient has been stable and afebrile.  Symptoms have mostly abated since admission      PLAN:Plan     Patient has been placed on IV antibiotics.  I have ordered MRCP to evaluate the biliary tree to better discern the anatomy and rule out any common bile duct stones as she has a dilated CBD.  MRCP is pending      Thank you for the opportunity of seeing Nadir Abernathy in consultation        [1]   Current Facility-Administered Medications   Medication Dose Route Frequency Provider Last Rate Last Admin    0.9% NaCl infusion   Intravenous Continuous Gloria Wong, NP 50 mL/hr at 04/23/25 2046 New Bag at 04/23/25 2046    acetaminophen tablet 650 mg  650 mg Oral Q4H PRN Gloria Wong, NP        enoxaparin injection 40 mg  40 mg Subcutaneous Daily Gloria Wong, NP   40 mg at 04/23/25 2045    HYDROcodone-acetaminophen 5-325 mg per tablet 1 tablet  1 tablet Oral Q4H PRN Gloria Wong, NP   1 tablet at 04/23/25 2222    melatonin tablet 6 mg  6 mg Oral Nightly PRN Gloria Wong, NP        morphine injection 2 mg  2 mg Intravenous Q4H PRN Gloria Wong, NP        ondansetron disintegrating tablet 8 mg  8 mg Oral Q8H PRN Gloria Wong, NP        ondansetron injection 8 mg  8 mg Intravenous Q6H PRN Gloria Wong, NP        pantoprazole EC tablet 40 mg  40 mg Oral Daily Gloria Wong, NP   40 mg at 04/24/25 0831    piperacillin-tazobactam (ZOSYN) 4.5 g in D5W 100 mL IVPB (MB+)  4.5 g Intravenous Q8H Gloria Wong, NP 25 mL/hr at 04/24/25 1253 4.5 g at 04/24/25 1253    sodium chloride 0.9% flush 10 mL  10 mL Intravenous PRN Gloria Wong,  NP         Current Outpatient Medications   Medication Sig Dispense Refill    acetaminophen (TYLENOL) 500 MG tablet Take 1 tablet (500 mg total) by mouth every 6 (six) hours as needed for Pain or Temperature greater than. (Patient not taking: Reported on 1/27/2025) 20 tablet 0    diclofenac sodium (VOLTAREN) 1 % Gel Apply 2 g topically 4 (four) times daily. (Patient not taking: Reported on 1/27/2025) 350 g 2    hydroCHLOROthiazide (MICROZIDE) 12.5 mg capsule Take 1 capsule (12.5 mg total) by mouth daily as needed (leg swelling). 90 capsule 3    mv-mn/iron fum/FA/omega3,6,9#3 (WOMEN'S MULTI ORAL) Take by mouth Daily. (Patient not taking: Reported on 1/27/2025)      nabumetone (RELAFEN) 500 MG tablet Take 1.5 tablets (750 mg total) by mouth once daily. Start AFTER completion of Medrol dose min. (Patient not taking: Reported on 1/27/2025) 60 tablet 1    omeprazole (PRILOSEC) 20 MG capsule TAKE 1 CAPSULE(20 MG) BY MOUTH DAILY 90 capsule 1    ondansetron (ZOFRAN-ODT) 4 MG TbDL Take by mouth.      oxyCODONE (OXY-IR) 5 mg Cap Take 10 mg by mouth every 4 (four) hours as needed for Pain.     [2]   Social History  Tobacco Use    Smoking status: Never    Smokeless tobacco: Never   Substance Use Topics    Alcohol use: Yes     Alcohol/week: 0.0 standard drinks of alcohol    Drug use: No

## 2025-04-24 NOTE — ASSESSMENT & PLAN NOTE
-placed in observation D/T concern for gallbladder fossa abscess formation with recent complicated surgical history and now with transaminitis  -at admission HDS and afebrile   -ED workup detailed above   -General Surgery consulted   -Zosyn initiated in ED >> continue   -tailor/de-escalate as appropriate   -continue gentle IV fluid hydration   -clear liquids for now >>> NPO at midnight in case of surgical need   -trend liver function >>> additional workup as clinically indicated   -PRN supportive care as indicated   -strict I&Os   -trend labs, address/replete electrolytes as indicated

## 2025-04-24 NOTE — ED NOTES
04/24/25 0410   Safety Interventions   Quick Updates - Free Text Easily aroused. NADN. Denies any needs or concerns at this time   Updates Patient is resting comfortably;Bed rails up - Call light within reach;Vitals stable;Denies pain   Patient Rounds bed in low position;bed wheels locked;call light in patient/parent reach;clutter free environment maintained;ID band on;placement of personal items at bedside;visualized patient        Healthy Plate from [facility] [employer]  Consistent CHO Counting for People with DM,  Nutrition Label Reading, Wt Loss Tips, Cooking for Wt Management from Nutrition Care Manual;               Fast Food Tips from [employer]

## 2025-04-24 NOTE — H&P
Merged with [facility]  History & Physical    Patient Name: Nadir Abernathy  MRN: [id number]  Patient Class: OP- Observation  Admission Date: 4/23/2025  Attending Physician: FENG Davis MD   Primary Care Provider: Jag Lake MD    Patient information was obtained from patient, past medical records, and ER records.     Subjective:     Principal Problem:Intra-abdominal fluid collection    Chief Complaint:   Chief Complaint   Patient presents with    Abdominal Pain     Reports abd pain onset Sunday. Pt endorses recent gallbladder removal 4 months ago. +nausea         HPI: Ms. Abernathy is a 54 YOF with PMHx of HTN, obesity, and s/p recent robotic converted to open cholecystectomy 12/30/2024  due to extensive intra-abdominal adhesions with significant necrosis involving the gallbladder itself complicated by incisional hernia at umbilicus robotic port site with  incarcerated small bowel causing the obstruction s/p surgical repair 01/07/2025.    She presents to ED due to abdominal pain that is sharp and intermittent in nature however has increased in intensity over the last several days.  She notes that on Saturday evening she had an episode of emesis with some abdominal discomfort however on Sunday she began feeling generally unwell with increasing abdominal pain; however no further nausea or vomiting.  She treated with Tylenol however symptoms persisted.  She was then concerned for constipation on Monday and took Dulcolax with resultant bowel movement however abdominal pain persisted prompting ED presentation. She denies fever, chills, SOB, SANTOS, CP, diarrhea, urinary symptoms or hematuria, decreased UOP, blood in stool, decreased appetite, changes in PO intake, light headiness, dizziness, or headaches. She lives alone, is independent in ADLs, denies ambulatory aid use.  She is employed as a  at [employer].  She reports occasional social alcohol use; denies tobacco or illicit drug  use.    In the ED she is HDS and afebrile.  CBC with WBC 6, H/H 14.2/43.8, platelets 366.  Chemistry is , K 4.5, chloride 104, CO2 25, BUN 5, SCr 0.7, glucose 82.  , T bili 1.4, , .  Lipase 22.  CPK 90.  Lactic acid 1.1.  Procalcitonin 0.  One 1.  UA unlikely infectious and without urinary symptoms.    ABD US:  Complex cystic area in the gallbladder fossa status post gallbladder removal.  This could be a hematoma seroma.  A bile leak may be less likely.     CT A/P:  Post cholecystectomy with a fluid collection in the gallbladder bed, which may represent a biloma, seroma, evolved hematoma, abscess or other etiology.  Extrahepatic biliary ductal dilatation.   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 Fibroid uterus. Colonic diverticulosis. .    The patient was placed in observation to the Hospital Medicine Service for further evaluation and treatment.     Past Medical History:   Diagnosis Date    Fibroids 1995       Past Surgical History:   Procedure Laterality Date    CHOLECYSTECTOMY N/A 12/30/2024    Procedure: CHOLECYSTECTOMY;  Surgeon: Chirag Acuna MD;  Location: [facility];  Service: General;  Laterality: N/A;    REPAIR, HERNIA, INCISIONAL N/A 1/7/2025    Procedure: REPAIR, HERNIA, INCISIONAL;  Surgeon: Chirag Acuna MD;  Location: [facility] [place] OR;  Service: General;  Laterality: N/A;    ROBOT-ASSISTED CHOLECYSTECTOMY N/A 12/30/2024    Procedure: ROBOTIC CHOLECYSTECTOMY, attempted & converted to open;  Surgeon: Chirag Acuna MD;  Location: [facility] [place] OR;  Service: General;  Laterality: N/A;    UTERINE FIBROID SURGERY         Review of patient's allergies indicates:   Allergen Reactions    Shellfish containing products Anaphylaxis       No current facility-administered medications on file prior to encounter.     Current Outpatient Medications on File Prior to Encounter   Medication Sig    acetaminophen (TYLENOL) 500 MG tablet Take 1 tablet (500 mg total) by mouth every 6 (six) hours as  needed for Pain or Temperature greater than. (Patient not taking: Reported on 1/27/2025)    diclofenac sodium (VOLTAREN) 1 % Gel Apply 2 g topically 4 (four) times daily. (Patient not taking: Reported on 1/27/2025)    hydroCHLOROthiazide (MICROZIDE) 12.5 mg capsule Take 1 capsule (12.5 mg total) by mouth daily as needed (leg swelling).    mv-mn/iron fum/FA/omega3,6,9#3 (WOMEN'S MULTI ORAL) Take by mouth Daily. (Patient not taking: Reported on 1/27/2025)    nabumetone (RELAFEN) 500 MG tablet Take 1.5 tablets (750 mg total) by mouth once daily. Start AFTER completion of Medrol dose min. (Patient not taking: Reported on 1/27/2025)    omeprazole (PRILOSEC) 20 MG capsule TAKE 1 CAPSULE(20 MG) BY MOUTH DAILY    ondansetron (ZOFRAN-ODT) 4 MG TbDL Take by mouth.    oxyCODONE (OXY-IR) 5 mg Cap Take 10 mg by mouth every 4 (four) hours as needed for Pain.     Family History       Problem Relation (Age of Onset)    Diabetes Father          Tobacco Use    Smoking status: Never    Smokeless tobacco: Never   Substance and Sexual Activity    Alcohol use: Yes     Alcohol/week: 0.0 standard drinks of alcohol    Drug use: No    Sexual activity: Yes     Partners: Male     Review of Systems   Constitutional:  Negative for activity change, appetite change, chills, fatigue and fever.   Respiratory:  Negative for cough, chest tightness, shortness of breath and wheezing.    Cardiovascular:  Negative for chest pain, palpitations and leg swelling.   Gastrointestinal:  Positive for abdominal pain, constipation, nausea and vomiting. Negative for abdominal distention and diarrhea.   Genitourinary:  Negative for decreased urine volume, difficulty urinating, dysuria, hematuria and urgency.   Musculoskeletal:  Negative for arthralgias and myalgias.   Neurological:  Negative for dizziness, syncope, weakness, light-headedness and headaches.     Objective:     Vital Signs (Most Recent):  Temp: 98.9 °F (37.2 °C) (04/23/25 1919)  Pulse: 78 (04/23/25  1919)  Resp: 16 (04/23/25 2222)  BP: 133/84 (04/23/25 1919)  SpO2: 98 % (04/23/25 1919) Vital Signs (24h Range):  Temp:  [97.9 °F (36.6 °C)-98.9 °F (37.2 °C)] 98.9 °F (37.2 °C)  Pulse:  [77-79] 78  Resp:  [16-18] 16  SpO2:  [97 %-98 %] 98 %  BP: (125-140)/(84-92) 133/84     Weight: 108.9 kg (240 lb)  Body mass index is 45.35 kg/m².     Physical Exam  Vitals and nursing note reviewed.   Constitutional:       General: She is not in acute distress.     Appearance: Normal appearance. She is well-developed. She is obese. She is not toxic-appearing.   HENT:      Head: Normocephalic and atraumatic.      Mouth/Throat:      Dentition: Normal dentition.   Eyes:      General: Lids are normal.      Extraocular Movements: Extraocular movements intact.      Conjunctiva/sclera: Conjunctivae normal.   Cardiovascular:      Rate and Rhythm: Normal rate and regular rhythm.      Heart sounds: Normal heart sounds. No murmur heard.  Pulmonary:      Effort: Pulmonary effort is normal.      Breath sounds: Normal breath sounds.   Abdominal:      General: Bowel sounds are normal. There is no distension.      Palpations: Abdomen is soft.      Tenderness: There is abdominal tenderness.   Musculoskeletal:      Cervical back: Neck supple.      Right lower leg: No edema.      Left lower leg: No edema.   Skin:     General: Skin is warm and dry.      Findings: No erythema or rash.   Neurological:      Mental Status: She is alert and oriented to person, place, and time.             Significant Labs: All pertinent labs within the past 24 hours have been reviewed.  CBC:   Recent Labs   Lab 04/23/25  1252   WBC 5.78   HGB 14.2   HCT 43.8        CMP:   Recent Labs   Lab 04/23/25  1252      K 4.5      CO2 25   BUN 5*   CREATININE 0.7   CALCIUM 10.2   ALBUMIN 3.6   BILITOT 1.4*   ALKPHOS 357*   *   *   ANIONGAP 9     Lactic Acid:   Recent Labs   Lab 04/23/25 2029   LACTATE 1.1     Lipase:   Recent Labs   Lab  04/23/25  1252   LIPASE 22     Urine Studies:   Recent Labs   Lab 04/23/25  1253   COLORU Yellow   APPEARANCEUA Clear   PHUR 7.0   SPECGRAV 1.025   PROTEINUA Trace*   GLUCUA Negative   BILIRUBINUA 1+*   OCCULTUA Negative   NITRITE Negative   UROBILINOGEN 2.0-3.0*   LEUKOCYTESUR 3+*   RBCUA 7*   WBCUA 6*   BACTERIA Rare       Significant Imaging: I have reviewed all pertinent imaging results/findings within the past 24 hours.  Assessment/Plan:     Assessment & Plan  Intra-abdominal fluid collection  S/P cholecystectomy  History of incisional hernia repair  History of small bowel obstruction  Transaminitis  -placed in observation D/T concern for gallbladder fossa abscess formation with recent complicated surgical history and now with transaminitis  -at admission HDS and afebrile   -ED workup detailed above   -General Surgery consulted   -Zosyn initiated in ED >> continue   -tailor/de-escalate as appropriate   -continue gentle IV fluid hydration   -clear liquids for now >>> NPO at midnight in case of surgical need   -trend liver function >>> additional workup as clinically indicated   -PRN supportive care as indicated   -strict I&Os   -trend labs, address/replete electrolytes as indicated    Hypertension, essential  -history of, BP stable at admission  -uses home HCTZ PRN LE edema  -trend BPs and address as appropriate     Morbid obesity  Body mass index is 45.35 kg/m². Morbid obesity complicates all aspects of disease management from diagnostic modalities to treatment. Weight loss encouraged and health benefits explained to patient.       VTE Risk Mitigation (From admission, onward)           Ordered     enoxaparin injection 40 mg  Daily         04/23/25 1953     IP VTE HIGH RISK PATIENT  Once         04/23/25 1953     Place sequential compression device  Until discontinued         04/23/25 1953                  On 04/23/2025, patient placed in hospital observation services.        Gloria Wong, DNP, AG-ACNP,  BC  Department of Hospital Medicine  [facility]

## 2025-04-24 NOTE — HPI
Ms. Abernathy is a 54 YOF with PMHx of HTN, obesity, and s/p recent robotic converted to open cholecystectomy 12/30/2024  due to extensive intra-abdominal adhesions with significant necrosis involving the gallbladder itself complicated by incisional hernia at umbilicus robotic port site with  incarcerated small bowel causing the obstruction s/p surgical repair 01/07/2025.    She presents to ED due to abdominal pain that is sharp and intermittent in nature however has increased in intensity over the last several days.  She notes that on Saturday evening she had an episode of emesis with some abdominal discomfort however on Sunday she began feeling generally unwell with increasing abdominal pain; however no further nausea or vomiting.  She treated with Tylenol however symptoms persisted.  She was then concerned for constipation on Monday and took Dulcolax with resultant bowel movement however abdominal pain persisted prompting ED presentation. She denies fever, chills, SOB, SANTOS, CP, diarrhea, urinary symptoms or hematuria, decreased UOP, blood in stool, decreased appetite, changes in PO intake, light headiness, dizziness, or headaches. She lives alone, is independent in ADLs, denies ambulatory aid use.  She is employed as a  at [employer].  She reports occasional social alcohol use; denies tobacco or illicit drug use.    In the ED she is HDS and afebrile.  CBC with WBC 6, H/H 14.2/43.8, platelets 366.  Chemistry is , K 4.5, chloride 104, CO2 25, BUN 5, SCr 0.7, glucose 82.  , T bili 1.4, , .  Lipase 22.  CPK 90.  Lactic acid 1.1.  Procalcitonin 0.  One 1.  UA unlikely infectious and without urinary symptoms.    ABD US:  Complex cystic area in the gallbladder fossa status post gallbladder removal.  This could be a hematoma seroma.  A bile leak may be less likely.     CT A/P:  Post cholecystectomy with a fluid collection in the gallbladder bed, which may represent a biloma, seroma, evolved  hematoma, abscess or other etiology.  Extrahepatic biliary ductal dilatation.   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 Fibroid uterus. Colonic diverticulosis. .    The patient was placed in observation to the Hospital Medicine Service for further evaluation and treatment.

## 2025-04-24 NOTE — PLAN OF CARE
Case Management Assessment     PCP: Dr Jag Lake     Patient Arrived From: Home  Existing Help at Home: Self, Independent    Barriers to Discharge: None    Discharge Plan:    A. Home w/family   B. Home      Patient AAOX3, independent at baseline. PCP correct on facesheet. Denies owning any DME. Family to provide transportation home.     04/24/25 0932   Discharge Assessment   Assessment Type Discharge Planning Assessment   Confirmed/corrected address, phone number and insurance Yes   Confirmed Demographics Correct on Facesheet   Source of Information patient   Communicated ELISABETH with patient/caregiver Date not available/Unable to determine   People in Home alone   Do you expect to return to your current living situation? Yes   Prior to hospitilization cognitive status: Alert/Oriented   Current cognitive status: Alert/Oriented   Walking or Climbing Stairs Difficulty no   Dressing/Bathing Difficulty no   Equipment Currently Used at Home none   Readmission within 30 days? No   Do you currently have service(s) that help you manage your care at home? No   Do you take prescription medications? Yes   Do you have prescription coverage? Yes   Do you have any problems affording any of your prescribed medications? No   Is the patient taking medications as prescribed? yes   Who is going to help you get home at discharge? Family   How do you get to doctors appointments? family or friend will provide   Are you on dialysis? No   Do you take coumadin? No   Discharge Plan A Home with family   Discharge Plan B Home   DME Needed Upon Discharge  none   Discharge Plan discussed with: Patient   Transition of Care Barriers None     [facility] - Emergency Dept (Observation)  Initial Discharge Assessment       Primary Care Provider: Jag Lake MD    Admission Diagnosis: Transaminitis [R74.01]    Admission Date: 4/23/2025  Expected Discharge Date:     Transition of Care Barriers: (P) None    Payor: MEDICAID / Plan: [place]  [employer] / Product Type: Managed Medicaid /     Extended Emergency Contact Information  Primary Emergency Contact: Crystal Kitchen   [place]  Mobile Phone: [phone]  Relation: Sister    Discharge Plan A: (P) Home with family  Discharge Plan B: (P) Home      [id number] - [place], [place] - [address]  [address]  [facility] [address]  Phone: [phone] Fax: [phone]      Initial Assessment (most recent)       Adult Discharge Assessment - 04/24/25 0932          Discharge Assessment    Assessment Type Discharge Planning Assessment (P)      Confirmed/corrected address, phone number and insurance Yes (P)      Confirmed Demographics Correct on Facesheet (P)      Source of Information patient (P)      Communicated ELISABETH with patient/caregiver Date not available/Unable to determine (P)      People in Home alone (P)      Do you expect to return to your current living situation? Yes (P)      Prior to hospitilization cognitive status: Alert/Oriented (P)      Current cognitive status: Alert/Oriented (P)      Walking or Climbing Stairs Difficulty no (P)      Dressing/Bathing Difficulty no (P)      Equipment Currently Used at Home none (P)      Readmission within 30 days? No (P)      Do you currently have service(s) that help you manage your care at home? No (P)      Do you take prescription medications? Yes (P)      Do you have prescription coverage? Yes (P)      Do you have any problems affording any of your prescribed medications? No (P)      Is the patient taking medications as prescribed? yes (P)      Who is going to help you get home at discharge? Family (P)      How do you get to doctors appointments? family or friend will provide (P)      Are you on dialysis? No (P)      Do you take coumadin? No (P)      Discharge Plan A Home with family (P)      Discharge Plan B Home (P)      DME Needed Upon Discharge  none (P)       Discharge Plan discussed with: Patient (P)      Transition of Care Barriers None (P)

## 2025-04-24 NOTE — ASSESSMENT & PLAN NOTE
Body mass index is 45.35 kg/m². Morbid obesity complicates all aspects of disease management from diagnostic modalities to treatment. Weight loss encouraged and health benefits explained to patient.

## 2025-04-24 NOTE — SUBJECTIVE & OBJECTIVE
Past Medical History:   Diagnosis Date    Fibroids 1995       Past Surgical History:   Procedure Laterality Date    CHOLECYSTECTOMY N/A 12/30/2024    Procedure: CHOLECYSTECTOMY;  Surgeon: Chirag Acuna MD;  Location: [facility] OR;  Service: General;  Laterality: N/A;    REPAIR, HERNIA, INCISIONAL N/A 1/7/2025    Procedure: REPAIR, HERNIA, INCISIONAL;  Surgeon: Chirag Acuna MD;  Location: [facility] OR;  Service: General;  Laterality: N/A;    ROBOT-ASSISTED CHOLECYSTECTOMY N/A 12/30/2024    Procedure: ROBOTIC CHOLECYSTECTOMY, attempted & converted to open;  Surgeon: Chirag Acuna MD;  Location: [facility] OR;  Service: General;  Laterality: N/A;    UTERINE FIBROID SURGERY         Review of patient's allergies indicates:   Allergen Reactions    Shellfish containing products Anaphylaxis       No current facility-administered medications on file prior to encounter.     Current Outpatient Medications on File Prior to Encounter   Medication Sig    acetaminophen (TYLENOL) 500 MG tablet Take 1 tablet (500 mg total) by mouth every 6 (six) hours as needed for Pain or Temperature greater than. (Patient not taking: Reported on 1/27/2025)    diclofenac sodium (VOLTAREN) 1 % Gel Apply 2 g topically 4 (four) times daily. (Patient not taking: Reported on 1/27/2025)    hydroCHLOROthiazide (MICROZIDE) 12.5 mg capsule Take 1 capsule (12.5 mg total) by mouth daily as needed (leg swelling).    mv-mn/iron fum/FA/omega3,6,9#3 (WOMEN'S MULTI ORAL) Take by mouth Daily. (Patient not taking: Reported on 1/27/2025)    nabumetone (RELAFEN) 500 MG tablet Take 1.5 tablets (750 mg total) by mouth once daily. Start AFTER completion of Medrol dose min. (Patient not taking: Reported on 1/27/2025)    omeprazole (PRILOSEC) 20 MG capsule TAKE 1 CAPSULE(20 MG) BY MOUTH DAILY    ondansetron (ZOFRAN-ODT) 4 MG TbDL Take by mouth.    oxyCODONE (OXY-IR) 5 mg Cap Take 10 mg by mouth every 4 (four) hours as needed for Pain.     Family History       Problem Relation (Age  of Onset)    Diabetes Father          Tobacco Use    Smoking status: Never    Smokeless tobacco: Never   Substance and Sexual Activity    Alcohol use: Yes     Alcohol/week: 0.0 standard drinks of alcohol    Drug use: No    Sexual activity: Yes     Partners: Male     Review of Systems   Constitutional:  Negative for activity change, appetite change, chills, fatigue and fever.   Respiratory:  Negative for cough, chest tightness, shortness of breath and wheezing.    Cardiovascular:  Negative for chest pain, palpitations and leg swelling.   Gastrointestinal:  Positive for abdominal pain, constipation, nausea and vomiting. Negative for abdominal distention and diarrhea.   Genitourinary:  Negative for decreased urine volume, difficulty urinating, dysuria, hematuria and urgency.   Musculoskeletal:  Negative for arthralgias and myalgias.   Neurological:  Negative for dizziness, syncope, weakness, light-headedness and headaches.     Objective:     Vital Signs (Most Recent):  Temp: 98.9 °F (37.2 °C) (04/23/25 1919)  Pulse: 78 (04/23/25 1919)  Resp: 16 (04/23/25 2222)  BP: 133/84 (04/23/25 1919)  SpO2: 98 % (04/23/25 1919) Vital Signs (24h Range):  Temp:  [97.9 °F (36.6 °C)-98.9 °F (37.2 °C)] 98.9 °F (37.2 °C)  Pulse:  [77-79] 78  Resp:  [16-18] 16  SpO2:  [97 %-98 %] 98 %  BP: (125-140)/(84-92) 133/84     Weight: 108.9 kg (240 lb)  Body mass index is 45.35 kg/m².     Physical Exam  Vitals and nursing note reviewed.   Constitutional:       General: She is not in acute distress.     Appearance: Normal appearance. She is well-developed. She is obese. She is not toxic-appearing.   HENT:      Head: Normocephalic and atraumatic.      Mouth/Throat:      Dentition: Normal dentition.   Eyes:      General: Lids are normal.      Extraocular Movements: Extraocular movements intact.      Conjunctiva/sclera: Conjunctivae normal.   Cardiovascular:      Rate and Rhythm: Normal rate and regular rhythm.      Heart sounds: Normal heart sounds.  No murmur heard.  Pulmonary:      Effort: Pulmonary effort is normal.      Breath sounds: Normal breath sounds.   Abdominal:      General: Bowel sounds are normal. There is no distension.      Palpations: Abdomen is soft.      Tenderness: There is abdominal tenderness.   Musculoskeletal:      Cervical back: Neck supple.      Right lower leg: No edema.      Left lower leg: No edema.   Skin:     General: Skin is warm and dry.      Findings: No erythema or rash.   Neurological:      Mental Status: She is alert and oriented to person, place, and time.             Significant Labs: All pertinent labs within the past 24 hours have been reviewed.  CBC:   Recent Labs   Lab 04/23/25  1252   WBC 5.78   HGB 14.2   HCT 43.8        CMP:   Recent Labs   Lab 04/23/25  1252      K 4.5      CO2 25   BUN 5*   CREATININE 0.7   CALCIUM 10.2   ALBUMIN 3.6   BILITOT 1.4*   ALKPHOS 357*   *   *   ANIONGAP 9     Lactic Acid:   Recent Labs   Lab 04/23/25  2029   LACTATE 1.1     Lipase:   Recent Labs   Lab 04/23/25  1252   LIPASE 22     Urine Studies:   Recent Labs   Lab 04/23/25  1253   COLORU Yellow   APPEARANCEUA Clear   PHUR 7.0   SPECGRAV 1.025   PROTEINUA Trace*   GLUCUA Negative   BILIRUBINUA 1+*   OCCULTUA Negative   NITRITE Negative   UROBILINOGEN 2.0-3.0*   LEUKOCYTESUR 3+*   RBCUA 7*   WBCUA 6*   BACTERIA Rare       Significant Imaging: I have reviewed all pertinent imaging results/findings within the past 24 hours.

## 2025-04-24 NOTE — PROGRESS NOTES
[facility] Emergency Dept (Observation)  [facility] Medicine  Progress Note    Patient Name: Nadir Abernathy  MRN: [id number]  Patient Class: OP- Observation   Admission Date: 4/23/2025  Length of Stay: 0 days  Attending Physician: FENG Davis MD  Primary Care Provider: Jag Lake MD        Subjective     Principal Problem:Intra-abdominal fluid collection        HPI:  Ms. Abernahty is a 54 YOF with PMHx of HTN, obesity, and s/p recent robotic converted to open cholecystectomy 12/30/2024  due to extensive intra-abdominal adhesions with significant necrosis involving the gallbladder itself complicated by incisional hernia at umbilicus robotic port site with  incarcerated small bowel causing the obstruction s/p surgical repair 01/07/2025.    She presents to ED due to abdominal pain that is sharp and intermittent in nature however has increased in intensity over the last several days.  She notes that on Saturday evening she had an episode of emesis with some abdominal discomfort however on Sunday she began feeling generally unwell with increasing abdominal pain; however no further nausea or vomiting.  She treated with Tylenol however symptoms persisted.  She was then concerned for constipation on Monday and took Dulcolax with resultant bowel movement however abdominal pain persisted prompting ED presentation. She denies fever, chills, SOB, SANTOS, CP, diarrhea, urinary symptoms or hematuria, decreased UOP, blood in stool, decreased appetite, changes in PO intake, light headiness, dizziness, or headaches. She lives alone, is independent in ADLs, denies ambulatory aid use.  She is employed as a  at [employer].  She reports occasional social alcohol use; denies tobacco or illicit drug use.    In the ED she is HDS and afebrile.  CBC with WBC 6, H/H 14.2/43.8, platelets 366.  Chemistry is , K 4.5, chloride 104, CO2 25, BUN 5, SCr 0.7, glucose 82.  , T bili 1.4, , .  Lipase 22.  CPK 90.   Lactic acid 1.1.  Procalcitonin 0.  One 1.  UA unlikely infectious and without urinary symptoms.    ABD US:  Complex cystic area in the gallbladder fossa status post gallbladder removal.  This could be a hematoma seroma.  A bile leak may be less likely.     CT A/P:  Post cholecystectomy with a fluid collection in the gallbladder bed, which may represent a biloma, seroma, evolved hematoma, abscess or other etiology.  Extrahepatic biliary ductal dilatation.   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 Fibroid uterus. Colonic diverticulosis. .    The patient was placed in observation to the Hospital Medicine Service for further evaluation and treatment.     Overview/Hospital Course:  No notes on file    Interval History: No acute events overnight. Pain since improved. Discussed plan of care. No new concerns at this time.    Review of Systems   Constitutional:  Negative for chills and fever.   Respiratory:  Negative for cough and shortness of breath.    Cardiovascular:  Negative for chest pain and palpitations.   Gastrointestinal:  Negative for abdominal pain, nausea and vomiting.     Objective:     Vital Signs (Most Recent):  Temp: 97.7 °F (36.5 °C) (04/24/25 2004)  Pulse: 62 (04/24/25 2004)  Resp: 18 (04/24/25 2110)  BP: 122/76 (04/24/25 2004)  SpO2: 98 % (04/24/25 2004) Vital Signs (24h Range):  Temp:  [97.7 °F (36.5 °C)-98.8 °F (37.1 °C)] 97.7 °F (36.5 °C)  Pulse:  [57-71] 62  Resp:  [16-18] 18  SpO2:  [97 %-99 %] 98 %  BP: (122-145)/(74-84) 122/76     Weight: 108.9 kg (240 lb)  Body mass index is 45.35 kg/m².    Intake/Output Summary (Last 24 hours) at 4/24/2025 2152  Last data filed at 4/24/2025 0802  Gross per 24 hour   Intake 100 ml   Output --   Net 100 ml         Physical Exam  Vitals and nursing note reviewed.   Constitutional:       General: She is not in acute distress.     Appearance: She is well-developed.   HENT:      Head: Normocephalic and atraumatic.   Eyes:      General:         Right eye: No  discharge.         Left eye: No discharge.      Conjunctiva/sclera: Conjunctivae normal.   Cardiovascular:      Rate and Rhythm: Normal rate.      Pulses: Normal pulses.   Pulmonary:      Effort: Pulmonary effort is normal. No respiratory distress.   Abdominal:      Palpations: Abdomen is soft.      Tenderness: There is no abdominal tenderness.   Musculoskeletal:         General: Normal range of motion.      Right lower leg: No edema.      Left lower leg: No edema.   Skin:     General: Skin is warm and dry.   Neurological:      Mental Status: She is alert and oriented to person, place, and time.           Significant Labs:   CBC:  Recent Labs   Lab 04/23/25  1252 04/24/25  0326   WBC 5.78 5.98   HGB 14.2 13.2   HCT 43.8 42.1    321   LYMPH 1.89  32.7 2.65  44.3   MONO 9.3  0.54 9.2  0.55   EOS 0.9  0.05 1.3  0.08   CMP:  Recent Labs   Lab 04/23/25  1252 04/24/25  0326    138   K 4.5 4.0    106   CO2 25 22*   BUN 5* 8   CREATININE 0.7 0.7   CALCIUM 10.2 9.7   MG  --  2.0   ALKPHOS 357* 321*   * 168*   * 414*   BILITOT 1.4* 0.8   ALBUMIN 3.6 3.1*   ANIONGAP 9 10      Significant Imaging: I have reviewed and interpreted all pertinent imaging results/findings within the past 24 hours.        Assessment & Plan  Intra-abdominal fluid collection  S/P cholecystectomy  History of incisional hernia repair  History of small bowel obstruction  Transaminitis  Hyperbilirubinemia  - Gallbladder fossa fluid collection of uncertain etiology in setting of prior surgical history.  - Continue piperacillin-tazobactam, monitor cultures / for fever.  - Surgery consulted; appreciate assistance. MRCP today.  - Symptomatic management.  - Advance diet as tolerated, trend labs.  Hypertension, essential  -history of, BP stable at admission  -uses home HCTZ PRN LE edema  -trend BPs and address as appropriate       VTE Risk Mitigation (From admission, onward)           Ordered     enoxaparin injection 40 mg   Daily         04/23/25 1953     IP VTE HIGH RISK PATIENT  Once         04/23/25 1953     Place sequential compression device  Until discontinued         04/23/25 1953                    Discharge Planning   ELISABETH: 4/26/2025     Code Status: Full Code   Medical Readiness for Discharge Date:   Discharge Plan A: Home with family                        D Nathan Davis MD  Department of Hospital Medicine   [facility] - Emergency Dept (Observation)

## 2025-04-24 NOTE — ASSESSMENT & PLAN NOTE
-history of, BP stable at admission  -uses home HCTZ PRN LE edema  -trend BPs and address as appropriate

## 2025-04-24 NOTE — ED NOTES
04/24/25 0054   Safety Interventions   Quick Updates - Free Text Pt sitting up in bed awake watching tv. NADN. Denies any needs or concerns at this time   Updates Bed rails up - Call light within reach;Vitals stable;Denies pain;Warm blanket given   Patient Rounds bed in low position;bed wheels locked;call light in patient/parent reach;clutter free environment maintained;ID band on;placement of personal items at bedside;visualized patient

## 2025-04-24 NOTE — ED NOTES
04/24/25 0640   Safety Interventions   Updates Patient is resting comfortably;Bed rails up - Call light within reach;Denies pain;Vitals stable   Patient Rounds bed in low position;bed wheels locked;call light in patient/parent reach;clutter free environment maintained;ID band on;placement of personal items at bedside;visualized patient

## 2025-04-25 VITALS
DIASTOLIC BLOOD PRESSURE: 71 MMHG | HEART RATE: 64 BPM | OXYGEN SATURATION: 97 % | BODY MASS INDEX: 45.32 KG/M2 | HEIGHT: 61 IN | SYSTOLIC BLOOD PRESSURE: 118 MMHG | TEMPERATURE: 98 F | WEIGHT: 240.06 LBS | RESPIRATION RATE: 16 BRPM

## 2025-04-25 LAB
ABSOLUTE EOSINOPHIL (OHS): 0.07 K/UL
ABSOLUTE MONOCYTE (OHS): 0.38 K/UL (ref 0.3–1)
ABSOLUTE NEUTROPHIL COUNT (OHS): 2.23 K/UL (ref 1.8–7.7)
ALBUMIN SERPL BCP-MCNC: 3.1 G/DL (ref 3.5–5.2)
ALP SERPL-CCNC: 357 UNIT/L (ref 40–150)
ALT SERPL W/O P-5'-P-CCNC: 319 UNIT/L (ref 10–44)
ANION GAP (OHS): 11 MMOL/L (ref 8–16)
AST SERPL-CCNC: 119 UNIT/L (ref 11–45)
BASOPHILS # BLD AUTO: 0.04 K/UL
BASOPHILS NFR BLD AUTO: 0.9 %
BILIRUB SERPL-MCNC: 0.7 MG/DL (ref 0.1–1)
BUN SERPL-MCNC: 5 MG/DL (ref 6–20)
CALCIUM SERPL-MCNC: 9.5 MG/DL (ref 8.7–10.5)
CHLORIDE SERPL-SCNC: 106 MMOL/L (ref 95–110)
CO2 SERPL-SCNC: 23 MMOL/L (ref 23–29)
CREAT SERPL-MCNC: 0.7 MG/DL (ref 0.5–1.4)
ERYTHROCYTE [DISTWIDTH] IN BLOOD BY AUTOMATED COUNT: 13.1 % (ref 11.5–14.5)
GFR SERPLBLD CREATININE-BSD FMLA CKD-EPI: >60 ML/MIN/1.73/M2
GLUCOSE SERPL-MCNC: 103 MG/DL (ref 70–110)
HCT VFR BLD AUTO: 40.5 % (ref 37–48.5)
HGB BLD-MCNC: 13.2 GM/DL (ref 12–16)
IMM GRANULOCYTES # BLD AUTO: 0.02 K/UL (ref 0–0.04)
IMM GRANULOCYTES NFR BLD AUTO: 0.4 % (ref 0–0.5)
LYMPHOCYTES # BLD AUTO: 1.76 K/UL (ref 1–4.8)
MAGNESIUM SERPL-MCNC: 2 MG/DL (ref 1.6–2.6)
MCH RBC QN AUTO: 28.4 PG (ref 27–31)
MCHC RBC AUTO-ENTMCNC: 32.6 G/DL (ref 32–36)
MCV RBC AUTO: 87 FL (ref 82–98)
NUCLEATED RBC (/100WBC) (OHS): 0 /100 WBC
PHOSPHATE SERPL-MCNC: 3.9 MG/DL (ref 2.7–4.5)
PLATELET # BLD AUTO: 376 K/UL (ref 150–450)
PMV BLD AUTO: 9.3 FL (ref 9.2–12.9)
POTASSIUM SERPL-SCNC: 4 MMOL/L (ref 3.5–5.1)
PROT SERPL-MCNC: 7.4 GM/DL (ref 6–8.4)
RBC # BLD AUTO: 4.64 M/UL (ref 4–5.4)
RELATIVE EOSINOPHIL (OHS): 1.6 %
RELATIVE LYMPHOCYTE (OHS): 39.1 % (ref 18–48)
RELATIVE MONOCYTE (OHS): 8.4 % (ref 4–15)
RELATIVE NEUTROPHIL (OHS): 49.6 % (ref 38–73)
SODIUM SERPL-SCNC: 140 MMOL/L (ref 136–145)
WBC # BLD AUTO: 4.5 K/UL (ref 3.9–12.7)

## 2025-04-25 PROCEDURE — 83735 ASSAY OF MAGNESIUM: CPT | Performed by: INTERNAL MEDICINE

## 2025-04-25 PROCEDURE — 84100 ASSAY OF PHOSPHORUS: CPT | Performed by: INTERNAL MEDICINE

## 2025-04-25 PROCEDURE — 36415 COLL VENOUS BLD VENIPUNCTURE: CPT | Performed by: INTERNAL MEDICINE

## 2025-04-25 PROCEDURE — 25000003 PHARM REV CODE 250: Performed by: NURSE PRACTITIONER

## 2025-04-25 PROCEDURE — 85025 COMPLETE CBC W/AUTO DIFF WBC: CPT | Performed by: INTERNAL MEDICINE

## 2025-04-25 PROCEDURE — 96366 THER/PROPH/DIAG IV INF ADDON: CPT

## 2025-04-25 PROCEDURE — G0378 HOSPITAL OBSERVATION PER HR: HCPCS

## 2025-04-25 PROCEDURE — 63600175 PHARM REV CODE 636 W HCPCS: Performed by: NURSE PRACTITIONER

## 2025-04-25 PROCEDURE — 99213 OFFICE O/P EST LOW 20 MIN: CPT | Mod: ,,, | Performed by: SURGERY

## 2025-04-25 PROCEDURE — 25000003 PHARM REV CODE 250: Performed by: INTERNAL MEDICINE

## 2025-04-25 PROCEDURE — 80053 COMPREHEN METABOLIC PANEL: CPT | Performed by: INTERNAL MEDICINE

## 2025-04-25 RX ORDER — ONDANSETRON 4 MG/1
4 TABLET, ORALLY DISINTEGRATING ORAL EVERY 6 HOURS PRN
Qty: 30 TABLET | Refills: 0 | Status: SHIPPED | OUTPATIENT
Start: 2025-04-25

## 2025-04-25 RX ORDER — HYDROCODONE BITARTRATE AND ACETAMINOPHEN 10; 325 MG/1; MG/1
1 TABLET ORAL EVERY 4 HOURS PRN
Refills: 0 | Status: DISCONTINUED | OUTPATIENT
Start: 2025-04-25 | End: 2025-04-25 | Stop reason: HOSPADM

## 2025-04-25 RX ORDER — AMOXICILLIN AND CLAVULANATE POTASSIUM 875; 125 MG/1; MG/1
1 TABLET, FILM COATED ORAL EVERY 12 HOURS
Status: DISCONTINUED | OUTPATIENT
Start: 2025-04-25 | End: 2025-04-25 | Stop reason: HOSPADM

## 2025-04-25 RX ORDER — HYDROCODONE BITARTRATE AND ACETAMINOPHEN 5; 325 MG/1; MG/1
1 TABLET ORAL EVERY 6 HOURS PRN
Qty: 6 TABLET | Refills: 0 | Status: SHIPPED | OUTPATIENT
Start: 2025-04-25 | End: 2025-05-02

## 2025-04-25 RX ADMIN — PANTOPRAZOLE SODIUM 40 MG: 40 TABLET, DELAYED RELEASE ORAL at 08:04

## 2025-04-25 RX ADMIN — PIPERACILLIN AND TAZOBACTAM 4.5 G: 4; .5 INJECTION, POWDER, LYOPHILIZED, FOR SOLUTION INTRAVENOUS at 03:04

## 2025-04-25 RX ADMIN — MELATONIN TAB 3 MG 6 MG: 3 TAB at 12:04

## 2025-04-25 RX ADMIN — AMOXICILLIN AND CLAVULANATE POTASSIUM 1 TABLET: 875; 125 TABLET, FILM COATED ORAL at 08:04

## 2025-04-25 NOTE — SUBJECTIVE & OBJECTIVE
Interval History: No acute events overnight. Pain since improved. Discussed plan of care. No new concerns at this time.    Review of Systems   Constitutional:  Negative for chills and fever.   Respiratory:  Negative for cough and shortness of breath.    Cardiovascular:  Negative for chest pain and palpitations.   Gastrointestinal:  Negative for abdominal pain, nausea and vomiting.     Objective:     Vital Signs (Most Recent):  Temp: 97.7 °F (36.5 °C) (04/24/25 2004)  Pulse: 62 (04/24/25 2004)  Resp: 18 (04/24/25 2110)  BP: 122/76 (04/24/25 2004)  SpO2: 98 % (04/24/25 2004) Vital Signs (24h Range):  Temp:  [97.7 °F (36.5 °C)-98.8 °F (37.1 °C)] 97.7 °F (36.5 °C)  Pulse:  [57-71] 62  Resp:  [16-18] 18  SpO2:  [97 %-99 %] 98 %  BP: (122-145)/(74-84) 122/76     Weight: 108.9 kg (240 lb)  Body mass index is 45.35 kg/m².    Intake/Output Summary (Last 24 hours) at 4/24/2025 2152  Last data filed at 4/24/2025 0802  Gross per 24 hour   Intake 100 ml   Output --   Net 100 ml         Physical Exam  Vitals and nursing note reviewed.   Constitutional:       General: She is not in acute distress.     Appearance: She is well-developed.   HENT:      Head: Normocephalic and atraumatic.   Eyes:      General:         Right eye: No discharge.         Left eye: No discharge.      Conjunctiva/sclera: Conjunctivae normal.   Cardiovascular:      Rate and Rhythm: Normal rate.      Pulses: Normal pulses.   Pulmonary:      Effort: Pulmonary effort is normal. No respiratory distress.   Abdominal:      Palpations: Abdomen is soft.      Tenderness: There is no abdominal tenderness.   Musculoskeletal:         General: Normal range of motion.      Right lower leg: No edema.      Left lower leg: No edema.   Skin:     General: Skin is warm and dry.   Neurological:      Mental Status: She is alert and oriented to person, place, and time.           Significant Labs:   CBC:  Recent Labs   Lab 04/23/25  1252 04/24/25  0326   WBC 5.78 5.98   HGB 14.2  13.2   HCT 43.8 42.1    321   LYMPH 1.89  32.7 2.65  44.3   MONO 9.3  0.54 9.2  0.55   EOS 0.9  0.05 1.3  0.08   CMP:  Recent Labs   Lab 04/23/25  1252 04/24/25  0326    138   K 4.5 4.0    106   CO2 25 22*   BUN 5* 8   CREATININE 0.7 0.7   CALCIUM 10.2 9.7   MG  --  2.0   ALKPHOS 357* 321*   * 168*   * 414*   BILITOT 1.4* 0.8   ALBUMIN 3.6 3.1*   ANIONGAP 9 10      Significant Imaging: I have reviewed and interpreted all pertinent imaging results/findings within the past 24 hours.

## 2025-04-25 NOTE — NURSING
IV removed with catheter intact, DC paperwork printed and given to pt per VN, Patient waiting for meds from pharmacy, sister will pick patient up

## 2025-04-25 NOTE — PLAN OF CARE
Case Management Final Discharge Note    Discharge Disposition: Home    New DME ordered / company name: None    Relevant SDOH / Transition of Care Barriers:  None    Person available to provide assistance at home when needed and their contact information: Self    Scheduled followup appointment: PCP    Referrals placed: None    Transportation: Family        Patient  educated on discharge services and updated on DC plan. Bedside RN notified, patient clear to discharge from Case Management Perspective.      04/25/25 1053   Final Note   Assessment Type Final Discharge Note   Anticipated Discharge Disposition Home   Hospital Resources/Appts/Education Provided Provided patient/caregiver with written discharge plan information;Appointments scheduled and added to AVS   Post-Acute Status   Discharge Delays None known at this time     [facility]urg ([facility])  Discharge Final Note    Primary Care Provider: Jag Lake MD    Expected Discharge Date: 4/25/2025    Final Discharge Note (most recent)       Final Note - 04/25/25 1053          Final Note    Assessment Type Final Discharge Note (P)      Anticipated Discharge Disposition Home or Self Care (P)      Hospital Resources/Appts/Education Provided Provided patient/caregiver with written discharge plan information;Appointments scheduled and added to AVS (P)         Post-Acute Status    Discharge Delays None known at this time (P)                      Important Message from Medicare             Contact Info       Jag Lake MD   Specialty: Family Medicine   Relationship: PCP - General    [address]  [address] [address]   Phone: [phone]       Next Steps: Follow up in 2 week(s)    Instructions: post-hospital follow-up

## 2025-04-25 NOTE — PLAN OF CARE
New admit.POC reviewed with the pt.RN assumed care.AAOX4.Room Air.IV Antibiotic administered timely.Ambulated to the bath room..Pain medication administered as requested.All due medication administered according to the MAR.No chest pain,SOB occurred during the night shift.No fever,chills ,rigors occurred.Observation reviewed and charted.Care explained,No falls or injury occurred during the shift.No any significant event undergo in the shift. Flow sheets updated timely. Purposeful rounding done every 2 hourly. Daily Weight charted,IP/OP maintained and charted. Pt kept under observation through out the shift     Problem: Adult Inpatient Plan of Care  Goal: Plan of Care Review  Outcome: Progressing  Goal: Patient-Specific Goal (Individualized)  Outcome: Progressing  Goal: Absence of Hospital-Acquired Illness or Injury  Outcome: Progressing  Goal: Optimal Comfort and Wellbeing  Outcome: Progressing  Goal: Readiness for Transition of Care  Outcome: Progressing     Problem: Bariatric Environmental Safety  Goal: Safety Maintained with Care  Outcome: Progressing     Problem: Infection  Goal: Absence of Infection Signs and Symptoms  Outcome: Progressing     Problem: Comorbidity Management  Goal: Blood Pressure in Desired Range  Outcome: Progressing     Problem: Wound  Goal: Optimal Coping  Outcome: Progressing  Goal: Optimal Functional Ability  Outcome: Progressing  Goal: Absence of Infection Signs and Symptoms  Outcome: Progressing  Goal: Improved Oral Intake  Outcome: Progressing  Goal: Optimal Pain Control and Function  Outcome: Progressing  Goal: Skin Health and Integrity  Outcome: Progressing  Goal: Optimal Wound Healing  Outcome: Progressing     Problem: Pain Acute  Goal: Optimal Pain Control and Function  Outcome: Progressing     Problem: Nausea and Vomiting  Goal: Nausea and Vomiting Relief  Outcome: Progressing     Problem: Fall Injury Risk  Goal: Absence of Fall and Fall-Related Injury  Outcome: Progressing      Problem: Constipation  Goal: Effective Bowel Elimination  Outcome: Progressing

## 2025-04-25 NOTE — ASSESSMENT & PLAN NOTE
Body mass index is 45.36 kg/m². Morbid obesity complicates all aspects of disease management from diagnostic modalities to treatment. Weight loss encouraged and health benefits explained to patient.

## 2025-04-25 NOTE — DISCHARGE SUMMARY
[facility] Medicine  Discharge Summary      Patient Name: Nadir Abernathy  MRN: [id number]  DEB: [id number]  Patient Class: OP- Observation  Admission Date: 4/23/2025  Hospital Length of Stay: 0 days  Discharge Date and Time: 4/25/2025 12:01 PM  Attending Physician: No att. providers found   Discharging Provider: ARTURO Davis MD  Primary Care Provider: Jag Lake MD    Primary Care Team: Networked reference to record PCT     HPI:   Ms. Abernathy is a 54 YOF with PMHx of HTN, obesity, and s/p recent robotic converted to open cholecystectomy 12/30/2024  due to extensive intra-abdominal adhesions with significant necrosis involving the gallbladder itself complicated by incisional hernia at umbilicus robotic port site with  incarcerated small bowel causing the obstruction s/p surgical repair 01/07/2025.    She presents to ED due to abdominal pain that is sharp and intermittent in nature however has increased in intensity over the last several days.  She notes that on Saturday evening she had an episode of emesis with some abdominal discomfort however on Sunday she began feeling generally unwell with increasing abdominal pain; however no further nausea or vomiting.  She treated with Tylenol however symptoms persisted.  She was then concerned for constipation on Monday and took Dulcolax with resultant bowel movement however abdominal pain persisted prompting ED presentation. She denies fever, chills, SOB, SANTOS, CP, diarrhea, urinary symptoms or hematuria, decreased UOP, blood in stool, decreased appetite, changes in PO intake, light headiness, dizziness, or headaches. She lives alone, is independent in ADLs, denies ambulatory aid use.  She is employed as a  at [employer].  She reports occasional social alcohol use; denies tobacco or illicit drug use.    In the ED she is HDS and afebrile.  CBC with WBC 6, H/H 14.2/43.8, platelets 366.  Chemistry is , K 4.5, chloride 104, CO2 25,  BUN 5, SCr 0.7, glucose 82.  , T bili 1.4, , .  Lipase 22.  CPK 90.  Lactic acid 1.1.  Procalcitonin 0.  One 1.  UA unlikely infectious and without urinary symptoms.    ABD US:  Complex cystic area in the gallbladder fossa status post gallbladder removal.  This could be a hematoma seroma.  A bile leak may be less likely.     CT A/P:  Post cholecystectomy with a fluid collection in the gallbladder bed, which may represent a biloma, seroma, evolved hematoma, abscess or other etiology.  Extrahepatic biliary ductal dilatation.   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 Fibroid uterus. Colonic diverticulosis. .    The patient was placed in observation to the Hospital Medicine Service for further evaluation and treatment.     * No surgery found *      Hospital Course:   Place in observation with gallbladder fossa fluid collection, pain and transaminitis. Started empiric antibiotic therapy. Had no fevers, chills. Surgery consulted and MRCP performed; no evidence of bile leak. Symptoms improved and no clear evidence of infectious process. Antibiotics discontinued. With clinical improvement and vital stability, she was prepared for discharge home.    Goals of Care Treatment Preferences:  Code Status: Full Code      SDOH Screening:  The patient was screened for utility difficulties, food insecurity, transport difficulties, housing insecurity, and interpersonal safety and there were no concerns identified this admission.     Consults:   Consults (From admission, onward)          Status Ordering Provider     Inpatient consult to Social Work  Once        Provider:  (Not yet assigned)    Completed FENG NASH     Inpatient consult to General Surgery  Once        Provider:  Dilip Sims MD    Completed SHANNON MORRIS            Assessment & Plan  Intra-abdominal fluid collection  S/P cholecystectomy  History of incisional hernia repair  History of small bowel  obstruction  Transaminitis  Hyperbilirubinemia  - Gallbladder fossa fluid collection of uncertain etiology in setting of prior surgical history.  - Continue piperacillin-tazobactam, monitor cultures / for fever.  - Surgery consulted; appreciate assistance. MRCP today.  - Symptomatic management.  - Advance diet as tolerated, trend labs.  Hypertension, essential  -history of, BP stable at admission  -uses home HCTZ PRN LE edema  -trend BPs and address as appropriate     Morbid obesity  Body mass index is 45.36 kg/m². Morbid obesity complicates all aspects of disease management from diagnostic modalities to treatment. Weight loss encouraged and health benefits explained to patient.         Final Active Diagnoses:    Diagnosis Date Noted POA    PRINCIPAL PROBLEM:  Intra-abdominal fluid collection [R18.8] 04/23/2025 Yes    Hyperbilirubinemia [E80.6] 04/24/2025 Yes    Transaminitis [R74.01] 04/23/2025 Yes    S/P cholecystectomy [Z90.49] 04/23/2025 Not Applicable    History of incisional hernia repair [Z98.890, Z87.19] 04/23/2025 Not Applicable    History of small bowel obstruction [Z87.19] 04/23/2025 Not Applicable    Morbid obesity [E66.01] 01/08/2025 Yes    Hypertension, essential [I10] 04/15/2019 Yes      Problems Resolved During this Admission:       Discharged Condition: good    Disposition: Home or Self Care    Follow Up:   Follow-up Information       Jag Lake MD Follow up in 2 week(s).    Specialty: Family Medicine  Why: post-hospital follow-up  Contact information:  [address]  [facility] [address]  [facility] [address]  [phone]                           Patient Instructions:      Diet Adult Regular     Notify your health care provider if you experience any of the following:  temperature >100.4     Notify your health care provider if you experience any of the following:  severe uncontrolled pain     Notify your health care provider if you experience any of the following:  persistent nausea and  vomiting or diarrhea     Activity as tolerated       Significant Diagnostic Studies:   CBC:  Recent Labs   Lab 04/23/25  1252 04/24/25  0326 04/25/25  0526   WBC 5.78 5.98 4.50   HGB 14.2 13.2 13.2   HCT 43.8 42.1 40.5    321 376   LYMPH 1.89  32.7 2.65  44.3 1.76  39.1   MONO 9.3  0.54 9.2  0.55 8.4  0.38   EOS 0.9  0.05 1.3  0.08 1.6  0.07     BMP:  Recent Labs   Lab 04/23/25  1252 04/24/25  0326 04/25/25  0526    138 140   K 4.5 4.0 4.0    106 106   CO2 25 22* 23   BUN 5* 8 5*   CREATININE 0.7 0.7 0.7   CALCIUM 10.2 9.7 9.5   MG  --  2.0 2.0   PHOS  --   --  3.9     CMP:  Recent Labs   Lab 04/23/25  1252 04/24/25  0326 04/25/25  0526    138 140   K 4.5 4.0 4.0    106 106   CO2 25 22* 23   BUN 5* 8 5*   CREATININE 0.7 0.7 0.7   CALCIUM 10.2 9.7 9.5   MG  --  2.0 2.0   PHOS  --   --  3.9   ALKPHOS 357* 321* 357*   * 168* 119*   * 414* 319*   BILITOT 1.4* 0.8 0.7   ALBUMIN 3.6 3.1* 3.1*   ANIONGAP 9 10 11     Imaging Results              MRI MRCP Abdomen W WO Contrast 3D WO Independent WS XPD (Final result)  Result time 04/24/25 15:14:10      Final result by Cornell Jimenes Jr., MD (04/24/25 15:14:10)                   Impression:      Cholecystectomy changes with mild intrahepatic and extrahepatic biliary ductal dilatation, etiology not certain.  Follow-up clinically.    Small postoperative fluid collection in the operative bed.      Electronically signed by: Cornell Xavier Jr  Date:    04/24/2025  Time:    15:14               Narrative:    EXAMINATION:  MRI MRCP ABDOMEN W WO CONTRAST 3D WO INDEPENDENT WS XPD    CLINICAL HISTORY:  RUQ abdominal pain, US nondiagnostic;biliary dilatation;    TECHNIQUE:  Pre and post-contrast multiplanar multisequence imaging of the abdomen was performed before and after the intravenous administration of  8 mL of Gadavist.  MRCP images include 3D MIP imaging of the biliary tract performed at the   console.    COMPARISON:  Ultrasound 04/23/2025 and CT 04/23/2025    FINDINGS:  The signal characteristics and enhancement patterns of the liver, spleen, pancreas, kidneys, and adrenal glands are unremarkable.    The retroperitoneal vessels enhance normally.    Miscellaneous: None    MRCP: Post cholecystectomy changes are present.  There is mild intra hepatic and proximal extrahepatic biliary ductal dilatation with the common bile duct tapering to the upper limit of normal at 8 mm just proximal to the ampulla.  No filling defects.  There is a small fluid collection adjacent to the cystic duct stump favored to represent a small postsurgical collection, hematoma, seroma, etcetera.  Biloma could be considered as well.  The collection does not communicate with the cystic duct.                                       CT Abdomen Pelvis With IV Contrast NO Oral Contrast (Final result)  Result time 04/23/25 18:02:15      Final result by Idalia Long MD (04/23/25 18:02:15)                   Impression:      Post cholecystectomy with a fluid collection in the gallbladder bed, which may represent a biloma, seroma, evolved hematoma, abscess or other etiology.  Extrahepatic biliary ductal dilatation.    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    Fibroid uterus.    Colonic diverticulosis.      Electronically signed by: Idalia Long  Date:    04/23/2025  Time:    18:02               Narrative:    EXAMINATION:  CT OF ABDOMEN PELVIS WITH    CLINICAL HISTORY:  Abdominal abscess/infection suspected;    TECHNIQUE:  5 mm enhanced axial images were obtained from the lung bases through the greater trochanters.  100 mL of Omnipaque 350 was injected.    COMPARISON:  01/07/2025    FINDINGS:  The liver, spleen, pancreas, right kidney and adrenal glands are unremarkable.    There is a subcentimeter fat containing left renal cortical lesion.    The gallbladder is surgically absent.  There is a 2.4 x 1.8 cm focal fluid  collection in the gallbladder fossa.  There is mild dilatation of the extrahepatic biliary duct, which measures approximately 12 mm.    There is no definite evidence for abdominal adenopathy or ascites.    There is a small fat containing umbilical hernia containing a knuckle of nonobstructing small bowel.    There is a heterogeneous lobular uterus, which exerts mild mass effect on the urinary bladder.  Colonic diverticulosis is present.  The appendix is not inflamed.    There is trace free fluid in the pelvis.    There is mild bibasilar atelectasis.                                       US Abdomen Limited (Gallbladder) (Final result)  Result time 04/23/25 15:35:21      Final result by Doug De Dios III, MD (04/23/25 15:35:21)                   Impression:      Complex cystic area in the gallbladder fossa status post gallbladder removal.  This could be a hematoma seroma.  A bile leak may be less likely.      Electronically signed by: Doug De Dios MD  Date:    04/23/2025  Time:    15:35               Narrative:    EXAMINATION:  US ABDOMEN LIMITED    CLINICAL HISTORY:  Elevation of levels of liver transaminase levels    FINDINGS:  Pancreas demonstrates no abnormality.  The gallbladder is been removed.  There is a cystic areas seen in the gallbladder fossa.  There is normal appearing bile duct measuring 6 mm.  No intrahepatic bile duct dilatation is seen.  Patient did not receive pain medication.                                      Pending Diagnostic Studies:       None           Medications:  Reconciled Home Medications:      Medication List        START taking these medications      HYDROcodone-acetaminophen 5-325 mg per tablet  Commonly known as: NORCO  Take 1 tablet by mouth every 6 (six) hours as needed for Pain.            CHANGE how you take these medications      ondansetron 4 MG Tbdl  Commonly known as: ZOFRAN-ODT  dissolve 1 tablet (4 mg total) by mouth every 6 (six) hours as needed (nausea).  What  changed:   how much to take  when to take this  reasons to take this            CONTINUE taking these medications      acetaminophen 500 MG tablet  Commonly known as: TYLENOL  Take 1 tablet (500 mg total) by mouth every 6 (six) hours as needed for Pain or Temperature greater than.     diclofenac sodium 1 % Gel  Commonly known as: VOLTAREN  Apply 2 g topically 4 (four) times daily.     hydroCHLOROthiazide 12.5 mg capsule  Commonly known as: MICROZIDE  Take 1 capsule (12.5 mg total) by mouth daily as needed (leg swelling).     omeprazole 20 MG capsule  Commonly known as: PRILOSEC  TAKE 1 CAPSULE(20 MG) BY MOUTH DAILY     WOMEN'S MULTI ORAL  Take by mouth Daily.            STOP taking these medications      oxyCODONE 5 mg Cap  Commonly known as: OXY-IR              Indwelling Lines/Drains at time of discharge:   Lines/Drains/Airways       None                   Time spent on the discharge of patient: 35 minutes         ARTURO Davis MD  Department of Hospital Medicine  [facility] ([place])

## 2025-04-25 NOTE — PLAN OF CARE
Problem: Adult Inpatient Plan of Care  Goal: Plan of Care Review  Outcome: Progressing     VIRTUAL NURSE:  Cued into patient's room.  Permission received per patient to turn camera to view patient.  Introduced as VN for night shift that will be working with floor nurse and nursing assistant.  Educated patient on VN's role in patient care.  Plan of care reviewed with patient.  Education per flowsheet.   Informed patient that staff will round on them every 2 hours but to use call light for any other needs they may have; informed of fall risk and fall precautions.  Patient verbalized understanding.  Call light within reach; bed siderails up x2.  Opportunity given for questions and questions answered.  Admission assessment questions answered.  Patient denies complaints or any needs at this time.  Instructed to call for assistance.  Will cont to monitor and intervene as needed.    Labs, notes, orders, and careplan initiated.       04/25/25 0000   Admission Complete   Admission Complete by VN Complete          04/25/25 0000   Patient Request   Patient Requested no complaints or needs currently   Admission   Initial VN Admission Questions Complete   Communication Issues? None   Shift   Virtual Nurse - Rounding Complete   Pain Management Interventions pain management plan reviewed with patient/caregiver   Virtual Nurse - Patient Verbalized Approval Of Camera Use;VN Rounding   Type of Frequent Check   Type Patient Rounds   Safety/Activity   Patient Rounds bed in low position;placement of personal items at bedside;bed wheels locked;call light in patient/parent reach;visualized patient;clutter free environment maintained   Safety Promotion/Fall Prevention assistive device/personal item within reach;commode/urinal/bedpan at bedside;high risk medications identified;Fall Risk reviewed with patient/family;diversional activities provided;medications reviewed;nonskid shoes/socks when out of bed;supervised activity;Supervised  toileting - stay within arms reach;instructed to call staff for mobility;side rails raised x 2   Safety Precautions emergency equipment at bedside   Infection Prevention cohorting utilized;hand hygiene promoted   Isolation Precautions precautions initiated   Positioning   Body Position neutral body alignment;neutral head position;position changed independently   Head of Bed (HOB) Positioning HOB at 60-90 degrees   Pain/Comfort/Sleep   Preferred Pain Scale number (Numeric Rating Pain Scale)   Comfort/Acceptable Pain Level 3   Pain Body Location abdomen   Pain Rating (0-10): Rest 3   POSS (Pasero Opioid-Induced Sed Scale) 1 - Awake and alert   Sleep/Rest/Relaxation no problem identified;awake

## 2025-04-25 NOTE — PROGRESS NOTES
[facility] ([place])  General Surgery  Progress Note    Subjective:     Interval History:  Patient currently has no pain or nausea.  Tolerating a diet.  She had 1 brief twinge of pain which was lasted only a few sec earlier but none now.    Liver function tests trending to normal.  Bilirubin normal  Post-Op Info:  * No surgery found *          Medications:  Continuous Infusions:  Scheduled Meds:   amoxicillin-clavulanate 875-125mg  1 tablet Oral Q12H    enoxparin  40 mg Subcutaneous Daily    pantoprazole  40 mg Oral Daily     PRN Meds:  Current Facility-Administered Medications:     acetaminophen, 650 mg, Oral, Q4H PRN    HYDROcodone-acetaminophen, 1 tablet, Oral, Q4H PRN    HYDROcodone-acetaminophen, 1 tablet, Oral, Q4H PRN    melatonin, 6 mg, Oral, Nightly PRN    ondansetron, 8 mg, Oral, Q8H PRN    ondansetron, 8 mg, Intravenous, Q6H PRN    sodium chloride 0.9%, 10 mL, Intravenous, PRN     Objective:     Vital Signs (Most Recent):  Temp: 98.3 °F (36.8 °C) (04/25/25 0742)  Pulse: 64 (04/25/25 0742)  Resp: 16 (04/25/25 0742)  BP: 118/71 (04/25/25 0742)  SpO2: 97 % (04/25/25 0742) Vital Signs (24h Range):  Temp:  [97.7 °F (36.5 °C)-98.3 °F (36.8 °C)] 98.3 °F (36.8 °C)  Pulse:  [61-71] 64  Resp:  [16-18] 16  SpO2:  [97 %-99 %] 97 %  BP: (118-139)/(71-84) 118/71       Intake/Output Summary (Last 24 hours) at 4/25/2025 0948  Last data filed at 4/25/2025 0558  Gross per 24 hour   Intake 598.65 ml   Output 400 ml   Net 198.65 ml       Physical Exam  General no apparent distress   Eyes nonicteric sclera   Abdomen soft nontender nondistended.  No bulge in the midline  Significant Labs:  CBC:   Recent Labs   Lab 04/25/25  0526   WBC 4.50   RBC 4.64   HGB 13.2   HCT 40.5      MCV 87   MCH 28.4   MCHC 32.6     CMP:   Recent Labs   Lab 04/25/25  0526   CALCIUM 9.5   ALBUMIN 3.1*      K 4.0   CO2 23      BUN 5*   CREATININE 0.7   ALKPHOS 357*   *   *   BILITOT 0.7       Significant  Diagnostics:  MRCP basically normal    Assessment/Plan:     Active Diagnoses:    Diagnosis Date Noted POA    PRINCIPAL PROBLEM:  Intra-abdominal fluid collection [R18.8] 04/23/2025 Yes    Hyperbilirubinemia [E80.6] 04/24/2025 Unknown    Transaminitis [R74.01] 04/23/2025 Yes    S/P cholecystectomy [Z90.49] 04/23/2025 Not Applicable    History of incisional hernia repair [Z98.890, Z87.19] 04/23/2025 Not Applicable    History of small bowel obstruction [Z87.19] 04/23/2025 Not Applicable    Morbid obesity [E66.01] 01/08/2025 Yes    Hypertension, essential [I10] 04/15/2019 Yes      Problems Resolved During this Admission:     She has moved to resolution.  We are not sure exactly why she had the pain or the transaminitis/hyperbilirubinemia.  It is possible that she passed some debris or sludge through her common bile duct and out into her duodenum.  However we have no evidence of this.  No surgical intervention or IR intervention indicated.  Okay for discharge from my standpoint    Chirag Acuna MD  General Surgery  [facility] ([place])

## 2025-04-25 NOTE — DISCHARGE INSTRUCTIONS
Magnetic Resonance Cholangiopancreatography (MRCP)  Healthcare providers use magnetic resonance cholangiopancreatography (MRCP), a contrast MRI, to diagnose pancreatic cancer, pancreatitis, gallstones and bile duct problems. An MRI scanner takes images as an IV dye travels through the pancreatic and biliary systems. Its less invasive than an endoscopic retrograde cholangiopancreatography (ERCP).    Overview  What is a magnetic resonance cholangiopancreatography (MRCP)?  A magnetic resonance cholangiopancreatography (MRCP) is an imaging test to examine your pancreatic and biliary (bile duct) systems. This test uses a dye, infused into your veins through an intravenous (IV) line. The dye (called a contrast agent) helps produce clearer images of your organs and the tubes that connect them. The results help your healthcare provider diagnose conditions and plan treatment.  What does an MRCP diagnose?  Your healthcare provider may recommend an MRCP if you have unexplained abdominal pain.  This specialized type of contrast MRI helps your provider diagnose:  Bile duct cancer (cholangiocarcinoma).  Bile duct stones or cysts.  Blocked or narrowed bile duct (biliary stricture).  Blocked, narrowed or dilated pancreatic ducts.  Dilated bile ducts.  Gallstones.  Inflammation in the pancreas (pancreatitis) or gallbladder (cholecystitis).  Pancreatic cancer.  Pancreatic cysts and pseudocysts.  Test Details  Who performs an MRCP?  A radiology technologist usually performs an MRCP. A radiologist (a physician who specializes in medical imaging) will also be present and read the test results. MRCP is an outpatient procedure that takes place at a medical clinic or hospital.  What types of MRI scanners are used for MRCP?  Different medical facilities use different MRI scanners. The types include:  Closed-bore MRI: You lie on an examination table that slides into a large cylinder-shaped tube surrounded by a powerful circular magnet. Your  head and abdomen are inside the scanner. Some closed-bore MRIs now have wider tunnel openings (wide-bore MRIs).  Open-bore MRI: These MRI scanners use magnets that are above and beneath you, but the sides are open. Theyre often much easier for people with claustrophobia (or other health concerns that make tight spaces uncomfortable) to tolerate.  What happens before an MRCP?  You should follow your healthcare providers instructions to prepare for an MRCP. You may need to:  Fast (not eat or drink) before the test.  Make sure a family member or friend can drive you home after the test if youre receiving sedation.  Notify your provider of any implanted metal devices you may have, such as a joint replacement, pacemaker or cochlear implant.  Remove anything that has metal, such as jewelry (including any body piercings), hearing aids and dentures.  Stop taking medications like nonsteroidal anti-inflammatory drugs (NSAIDs), as well as vitamins and herbal supplements.  Receive a sedative as a pill or through an IV to help you relax if you have claustrophobia. Your care team may also advise closing your eyes to put you more at ease.  Tell your provider if you have hay fever (allergic rhinitis), asthma, food allergies or are prone to hives (urticaria). These conditions may slightly increase your risk of an allergic reaction to the contrast dye.  What happens during an MRCP?  During an MRCP, your care team:  Offers you earplugs or headphones to help dull the noise of the scanner and protect your hearing. Youll still be able to hear your care team.  Positions you face up on the exam table, using straps and bolsters if needed, to help you stay in the desired position.  Places coiling devices that send and receive radio waves around your body.  Administers the IV dye, which may cause you to feel a brief cooling sensation.  Pushes controls to slide the exam table into the closed-bore MRI tunnel (or you lie on the open-bore  table).  Leaves the room. The technologist can still see you through a window and communicate with you through a two-way intercom.  Runs a sequence of MRI scans. Your body may feel slightly warm. You need to remain still and hold your breath while the scanner takes the images.  How long does an MRCP take?  It takes about 15 minutes to run the series of scans for an MRCP. You may also get a standard abdominal MRI, which can take an extra 30 minutes.  What happens after an MRCP?  You can go home after your vital signs look good and the sedative (if you received one) wears off. Side effects from the dye or procedure are rare. You should alert your care team if you feel nauseated, have a headache or feel like youre having an allergic reaction. Someone should drive you home after the test. You can resume your usual activities and diet.  What are the benefits of an MRCP?  This specialized contrast MRI produces clear, detailed images of organs and ducts without the use of X-ray radiation. MRCP uses an MRI scanner, which creates an extremely powerful magnetic field and uses radiofrequency waves and computer processing to create images. The radiofrequency waves for an MRI are similar to those used for 4vets radio broadcasts you can listen to in your car. That means theres no radiation exposure.  What are the risks of an MRCP?  An MRCP is a relatively safe procedure. Potential risks include:  Allergic reaction: Its rare for someone to experience an allergic reaction like anaphylaxis to the IV contrast agent or dye during an MRCP. The dye uses an element called gadolinium instead of iodine since iodine is more likely to cause a reaction.  Claustrophobia: Lying in an enclosed space like an MRI scanner can be difficult -- if not impossible -- if you have claustrophobia (fear of enclosed spaces). About 1 in 8 people have claustrophobia, making it one of the more common phobias. Healthcare providers who perform MRCP tests are very  familiar with this phobia and can give you a sedative to help you relax during the test.  Who should not get an MRCP?  After an MRCP, your kidneys filter out the contrast dye. But studies suggest that small amounts of the metal may stay in your body for months or years. Most people dont have any side effects because of it, but people with kidney problems (kidney disease or kidney failure) may not be able to undergo MRCP. You also shouldnt get an MRCP if youre pregnant or plan on becoming pregnant within a year.    Results and Follow-Up  When should I get the test results?  Your radiologist will read the imaging scans and send the results to your referring healthcare provider. It may take up to a week or two for your provider to get the test results. Theyll review the results with you. Depending on the findings, you may need surgery or a different treatment.  When should I call my healthcare provider?  You should contact your healthcare provider if you develop hives or other signs of an allergic reaction when you get home. You should also let them know if symptoms like abdominal pain worsen.    Additional Common Questions  Whats the difference between an ERCP and an MRCP?  An endoscopic retrograde cholangiopancreatography (ERCP) and MRCP check for the same pancreatic and biliary issues. An ERCP is more invasive and requires anesthesia. Gastroenterologists, doctors who specialize in digestive diseases, perform ERCPs.  During the procedure, your provider:  Inserts an endoscope (flexible tube with a camera and light) into your mouth and through the esophagus (food pipe) to reach the small intestine.  Slides a catheter (thin, flexible tube) through the endoscope to reach the bile ducts and pancreatic ducts.  Injects a dye through the catheter.  Uses fluoroscopy (moving X-ray images) to view the ducts as the dye travels through them.  An ERCP may take place at the same time as an upper endoscopy procedure. Your  provider can also perform treatments during an ERCP. For instance, they can break up and remove stones or place stents to open blocked ducts. They can also do a biopsy to collect tissue samples for analysis. These procedures arent possible with an MRCP.

## 2025-04-25 NOTE — ED NOTES
Care assumed, Introduction given, pt aaxo4, resp even and unlabored, nad noted. Personal belongings at bedside, updated pt on-going care. Informed pt of hospital policy & procedures, instructed pt to use call light for all questions and concerns. Pt verbalizes understanding, only compliant at this time is abdominal pain 7/10.

## 2025-04-25 NOTE — NURSING
"VN cued into the room and permission is given to adjust the camera towards patient who is sitting up in bed and in no apparent distress.  States, "I feel so much better."  AVS reviewed with patient and patient verbalized complete understanding on the discharge instructions.  Patient will call for wheelchair per Transport once medications are delivered.  Voiced no other concerns.  "

## 2025-05-01 ENCOUNTER — OFFICE VISIT (OUTPATIENT)
Dept: INTERNAL MEDICINE | Facility: CLINIC | Age: 55
End: 2025-05-01
Attending: FAMILY MEDICINE
Payer: MEDICAID

## 2025-05-01 VITALS
HEART RATE: 81 BPM | HEIGHT: 61 IN | SYSTOLIC BLOOD PRESSURE: 118 MMHG | WEIGHT: 220.69 LBS | OXYGEN SATURATION: 98 % | BODY MASS INDEX: 41.66 KG/M2 | DIASTOLIC BLOOD PRESSURE: 74 MMHG

## 2025-05-01 DIAGNOSIS — R74.01 TRANSAMINITIS: ICD-10-CM

## 2025-05-01 DIAGNOSIS — E66.01 MORBID OBESITY: ICD-10-CM

## 2025-05-01 DIAGNOSIS — R18.8 INTRA-ABDOMINAL FLUID COLLECTION: Primary | ICD-10-CM

## 2025-05-01 DIAGNOSIS — I10 HYPERTENSION, ESSENTIAL: ICD-10-CM

## 2025-05-01 PROCEDURE — 99213 OFFICE O/P EST LOW 20 MIN: CPT | Mod: PBBFAC | Performed by: FAMILY MEDICINE

## 2025-05-01 PROCEDURE — 99999 PR PBB SHADOW E&M-EST. PATIENT-LVL III: CPT | Mod: PBBFAC,,, | Performed by: FAMILY MEDICINE

## 2025-05-01 NOTE — PROGRESS NOTES
CHIEF COMPLAINT: The patient presents hospital follow-up    HISTORY OF PRESENT ILLNESS: The patient presents for hospital follow-up.  7 day f/u.  She had subacute onset of right upper quadrant pain.  She presented to the emergency room where ultrasound and CT revealed a large perihepatic RUQ fluid collection.  She had associated transaminitis as well.  The LFTs improved over the hospitalization.  Her pain improved dramatically during hospitalization.  In the end No clear Dx was made but she is much better    At the very end of 2024 she underwent a 6+ hour open paulie.  Procedure started as a robotic lap paulie but quickly had to be abandoned due to extensive intra-abdominal adhesions.  There was apparently significant necrosis involving the gallbladder itself.  She has had a postoperative 30# wt loss.      She is having allergic rhinitis as well.    She works as a  at [employer].    BP is good.     REVIEW OF SYSTEMS:  GENERAL: No fatigability or weight loss.  SKIN: No rashes, itching or changes in color or texture of skin.  HEAD: No headaches or recent head trauma.  EYES: Visual acuity fine. No photophobia, ocular pain or diplopia.  EARS: Denies ear pain, discharge or vertigo.  NOSE: No loss of smell, no epistaxis or postnasal drip.  MOUTH & THROAT: No hoarseness or change in voice. No excessive gum bleeding.  NODES: Denies swollen glands.  CHEST: Denies SANTOS, cyanosis, wheezing, and sputum production.  CARDIOVASCULAR: Denies chest pain, PND, orthopnea or reduced exercise tolerance.  ABDOMEN: Appetite fine. No weight loss. Denies diarrhea, hematemesis or blood in stool.  URINARY: No flank pain, dysuria or hematuria.  PERIPHERAL VASCULAR: No claudication or cyanosis.  MUSCULOSKELETAL: No joint stiffness or swelling. Denies back pain. Except as above  NEUROLOGIC: No history of seizures, paralysis, alteration of gait or coordination.    SOCIAL HISTORY: Unchanged since recent note    PHYSICAL EXAMINATION:  "  Blood pressure 118/74, pulse 81, height 5' 1" (1.549 m), weight 100.1 kg (220 lb 10.9 oz), SpO2 98%.    APPEARANCE: Well nourished, well developed, in no acute distress.    HEAD: Normocephalic, atraumatic.  EYES: PERRL. EOMI.  Conjunctivae without injection and  Anicteric  NOSE: Mucosa pink. Airway clear.  MOUTH & THROAT: No tonsillar enlargement. No pharyngeal erythema or exudate. No stridor.  NECK: Supple.   NODES: No cervical, axillary or inguinal lymph node enlargement.  CHEST: Lungs demonstrate scattered mild wheezes bilaterally.  No retractions are noted.  No rales or rhonchi are present.  CARDIOVASCULAR: Normal S1, S2. No rubs, murmurs or gallops.  ABDOMEN: Bowel sounds normal. Not distended. Soft. No tenderness or masses.  No ascites is noted.  MUSCULOSKELETAL:  There is no clubbing, cyanosis, or edema of the extremities x4.  There is full range of motion of the lumbar spine.  There is full range of motion of the extremities x4.  There is no deformity noted.    NEUROLOGIC:       Normal speech development.      Hearing normal.      Normal gait.      Motor and sensory exams grossly normal.  PSYCHIATRIC: Patient is alert and oriented x3.  Thought processes are all normal.  There is no homicidality.  There is no suicidality.  There is no evidence of psychosis.    LABORATORY/RADIOLOGY: Chart reviewed.    ASSESSMENT:   RUQ fluid collection  No clear Dx    Acute necrotic cholecystitis  Open cholecystectomy  Incisional hernia 1 week postop w/SBO  HTN  AR  Chronic left lower back pain    PLAN:  She is reassured that with the passing of time she will get back to 100%  GYN  Continue omeprazole  HCTZ  [facility]  Pain clinic referral    RTC 1 year  "

## (undated) DEVICE — SYR IRRIGATION BULB STER 60ML

## (undated) DEVICE — DRAPE ARM DAVINCI XI

## (undated) DEVICE — GLOVE 7.0 PROTEXIS PI MICRO

## (undated) DEVICE — Device

## (undated) DEVICE — POWDER ARISTA AH 3G

## (undated) DEVICE — TUBING INSUFFLATOR W/ROT CONCT

## (undated) DEVICE — SUT VICRYL PLUS 3-0 SH 18IN

## (undated) DEVICE — ADHESIVE MASTISOL VIAL 48/BX

## (undated) DEVICE — TROCAR ENDOPATH XCEL 12X100MM

## (undated) DEVICE — POSITIONER HEEL FOAM CONVOLTD

## (undated) DEVICE — BLADE SURG CARBON STEEL #10

## (undated) DEVICE — BLADE SURG STAINLESS STEEL #15

## (undated) DEVICE — SHARPS BOX

## (undated) DEVICE — YANKAUER OPEN TIP W/O VENT

## (undated) DEVICE — COVER LIGHT HANDLE

## (undated) DEVICE — SOL ELECTROLUBE ANTI-STIC

## (undated) DEVICE — DRAPE UTILITY STRL 15X26IN

## (undated) DEVICE — DRAPE LAP T SHT W/ INSTR PAD

## (undated) DEVICE — SYR 10CC LUER LOCK

## (undated) DEVICE — SUT VICRYL CTD 2-0 GI 27 SH

## (undated) DEVICE — STAPLER SKIN PROXIMATE WIDE

## (undated) DEVICE — NDL HYPO REG 25G X 1 1/2

## (undated) DEVICE — IRRIGATOR ENDOWRIST XI SUCTION

## (undated) DEVICE — GOWN NONREINF SET-IN SLV XL

## (undated) DEVICE — TRAY DO THE ROBOT

## (undated) DEVICE — SEAL CANN UNIVERSAL 5-12MM

## (undated) DEVICE — SLEEVE SCD EXPRESS CALF MEDIUM

## (undated) DEVICE — SUT ETHIBOND 0 CR/CT-2 8-18

## (undated) DEVICE — OBTURATOR BLADELESS 8MM XI CLR

## (undated) DEVICE — DRESSING CHG FOAM 4MM 1IN

## (undated) DEVICE — TOWEL OR NONABSORB ADH 17X26

## (undated) DEVICE — DRAPE STERI INSTRUMENT 1018

## (undated) DEVICE — SOL IRR SOD CHL .9% POUR

## (undated) DEVICE — GLOVE SENSICARE PI MICRO 7.5

## (undated) DEVICE — DRAPE COLUMN DAVINCI XI

## (undated) DEVICE — SOL IRRI STRL WATER 1000ML

## (undated) DEVICE — DRESSING ADH ISLAND 3.6 X 14

## (undated) DEVICE — DRESSING TRANS 4X4 TEGADERM

## (undated) DEVICE — PENCIL ELECTROSURG HOLST W/BLD

## (undated) DEVICE — SUT 2/0 30IN SILK BLK BRAI

## (undated) DEVICE — SYR B-D DISP CONTROL 10CC100/C

## (undated) DEVICE — BLADE SURG STAINLESS STEEL #11

## (undated) DEVICE — ELECTRODE BLD EXT INSUL 1

## (undated) DEVICE — SUT 1 36IN PDS II VIO MONO

## (undated) DEVICE — DRAPE T TRNSVRS LAP 102X78X121

## (undated) DEVICE — CLOSURE SKIN STERI STRIP 1/2X4

## (undated) DEVICE — BAG TISSUE RETRIEVAL 225ML

## (undated) DEVICE — SUT VICRYL 3-0 27 RB-1

## (undated) DEVICE — SUT PDS II O CT-2 VIL MONO

## (undated) DEVICE — CHLORAPREP W TINT 26ML APPL

## (undated) DEVICE — TRAY FOLEY 16FR INFECTION CONT

## (undated) DEVICE — SUT VICRYL+ 27 UR-6 VIOL

## (undated) DEVICE — SOL CLEARIFY VISUALIZATION LAP

## (undated) DEVICE — SUT VICRYL PLUS 4-0 PS2 27

## (undated) DEVICE — KIT WING PAD POSITIONING

## (undated) DEVICE — SUT STRATAFIX PGAPCL 3 FS-2

## (undated) DEVICE — CONTAINER SPEC OR STRL 4.5OZ

## (undated) DEVICE — HEMOCLIPS GREEN

## (undated) DEVICE — ELECTRODE REM PLYHSV RETURN 9

## (undated) DEVICE — SPONGE LAP 18X18 PREWASHED

## (undated) DEVICE — POSITIONER HEAD DONUT 9IN FOAM

## (undated) DEVICE — DRAPE CORETEMP FLD WRM 56X62IN

## (undated) DEVICE — TUBING SUC UNIV W/CONN 12FT

## (undated) DEVICE — TUBING INSUFFLATION 10